# Patient Record
Sex: MALE | Race: WHITE | NOT HISPANIC OR LATINO | Employment: OTHER | URBAN - METROPOLITAN AREA
[De-identification: names, ages, dates, MRNs, and addresses within clinical notes are randomized per-mention and may not be internally consistent; named-entity substitution may affect disease eponyms.]

---

## 2017-03-14 ENCOUNTER — TELEPHONE (OUTPATIENT)
Dept: ORTHOPEDIC SURGERY | Facility: CLINIC | Age: 77
End: 2017-03-14

## 2017-04-03 ENCOUNTER — OFFICE VISIT (OUTPATIENT)
Dept: ORTHOPEDIC SURGERY | Facility: CLINIC | Age: 77
End: 2017-04-03

## 2017-04-03 VITALS — BODY MASS INDEX: 30.13 KG/M2 | WEIGHT: 192 LBS | HEIGHT: 67 IN | TEMPERATURE: 97.5 F

## 2017-04-03 DIAGNOSIS — M25.511 BILATERAL SHOULDER PAIN, UNSPECIFIED CHRONICITY: Primary | ICD-10-CM

## 2017-04-03 DIAGNOSIS — M54.12 CERVICAL RADICULOPATHY: ICD-10-CM

## 2017-04-03 DIAGNOSIS — M25.512 BILATERAL SHOULDER PAIN, UNSPECIFIED CHRONICITY: Primary | ICD-10-CM

## 2017-04-03 PROCEDURE — 99204 OFFICE O/P NEW MOD 45 MIN: CPT | Performed by: ORTHOPAEDIC SURGERY

## 2017-04-03 PROCEDURE — 20610 DRAIN/INJ JOINT/BURSA W/O US: CPT | Performed by: ORTHOPAEDIC SURGERY

## 2017-04-03 RX ORDER — METHYLPREDNISOLONE ACETATE 80 MG/ML
160 INJECTION, SUSPENSION INTRA-ARTICULAR; INTRALESIONAL; INTRAMUSCULAR; SOFT TISSUE
Status: COMPLETED | OUTPATIENT
Start: 2017-04-03 | End: 2017-04-03

## 2017-04-03 RX ORDER — LIDOCAINE HYDROCHLORIDE 10 MG/ML
2 INJECTION, SOLUTION INFILTRATION; PERINEURAL
Status: COMPLETED | OUTPATIENT
Start: 2017-04-03 | End: 2017-04-03

## 2017-04-03 RX ORDER — BUPIVACAINE HYDROCHLORIDE 5 MG/ML
2 INJECTION, SOLUTION PERINEURAL
Status: COMPLETED | OUTPATIENT
Start: 2017-04-03 | End: 2017-04-03

## 2017-04-03 RX ADMIN — BUPIVACAINE HYDROCHLORIDE 2 ML: 5 INJECTION, SOLUTION PERINEURAL at 13:59

## 2017-04-03 RX ADMIN — METHYLPREDNISOLONE ACETATE 160 MG: 80 INJECTION, SUSPENSION INTRA-ARTICULAR; INTRALESIONAL; INTRAMUSCULAR; SOFT TISSUE at 13:58

## 2017-04-03 RX ADMIN — LIDOCAINE HYDROCHLORIDE 2 ML: 10 INJECTION, SOLUTION INFILTRATION; PERINEURAL at 13:58

## 2017-04-03 RX ADMIN — METHYLPREDNISOLONE ACETATE 160 MG: 80 INJECTION, SUSPENSION INTRA-ARTICULAR; INTRALESIONAL; INTRAMUSCULAR; SOFT TISSUE at 13:59

## 2017-04-03 RX ADMIN — LIDOCAINE HYDROCHLORIDE 2 ML: 10 INJECTION, SOLUTION INFILTRATION; PERINEURAL at 13:59

## 2017-04-03 RX ADMIN — BUPIVACAINE HYDROCHLORIDE 2 ML: 5 INJECTION, SOLUTION PERINEURAL at 13:58

## 2017-04-03 NOTE — PROGRESS NOTES
Patient: Mukesh Beard Jr.    YOB: 1940    Medical Record Number: [id number]    Chief Complaints:   Bilateral shoulder pain, weakness    History of Present Illness:     76 y.o. male patient who comes in today for evaluation of both shoulders.  He reports a long history of bilateral shoulder pain.  Both shoulders bother him but the right is currently worse.  He has a history of a left rotator cuff tear for which she has previously seen Dr. Stroud.  He describes his pain on the right as moderate, intermittent, and aching/burning.  He has pain down the lateral side of the shoulder which is worse with reaching and lifting but the worst of his pain is into the back of his shoulder along the scapula.  This pain seems to come and go without any discrete precipitating event or factor.  He denies any weakness, numbness, or paresthesias on the right.    On the left, he also complains of moderate aching discomfort along the lateral side.  This pain is worse with reaching and lifting.  He has tried various anti-inflammatories and some formal physical therapy in the past for the left shoulder.  These did not seem to help very much.  Again, he denies any neurologic symptoms on the left as well.    Allergies: No Known Allergies    Home Medications:    Current Outpatient Prescriptions:   •  aspirin 81 MG tablet, Take 81 mg by mouth Daily., Disp: , Rfl:   •  atenolol (TENORMIN) 25 MG tablet, Take 12.5 mg by mouth Daily., Disp: , Rfl:   •  azelastine (ASTEPRO) 0.15 % solution nasal spray, into each nostril., Disp: , Rfl:   •  cetirizine (zyrTEC) 10 MG tablet, Take 10 mg by mouth Daily., Disp: , Rfl:   •  Cholecalciferol (VITAMIN D) 2000 UNITS capsule, Take  by mouth., Disp: , Rfl:   •  Flaxseed, Linseed, (FLAX SEED OIL) 1000 MG capsule, Take 1,200 mg by mouth Daily., Disp: , Rfl:   •  fluticasone (FLONASE) 50 MCG/ACT nasal spray, into each nostril Daily., Disp: , Rfl:   •  meloxicam (MOBIC) 15 MG tablet, Take 15 mg  by mouth Daily., Disp: , Rfl:   •  montelukast (SINGULAIR) 10 MG tablet, Take 10 mg by mouth Every Night., Disp: , Rfl:   •  niacin 500 MG tablet, Take 1,000 mg by mouth Daily With Breakfast., Disp: , Rfl:   •  Omega-3 Fatty Acids (FISH OIL) 1200 MG capsule capsule, Take  by mouth., Disp: , Rfl:   •  raNITIdine (ZANTAC) 300 MG tablet, Take 300 mg by mouth Daily., Disp: , Rfl:   •  rosuvastatin (CRESTOR) 20 MG tablet, Take 20 mg by mouth Daily., Disp: , Rfl:   •  Cinnamon 500 MG capsule, Take  by mouth., Disp: , Rfl:     Past Medical History:   Diagnosis Date   • ASCVD (arteriosclerotic cardiovascular disease)     mild lv dysfunction   • Hyperlipidemia    • Hypertension    • Vertigo 04/2011       Past Surgical History:   Procedure Laterality Date   • APPENDECTOMY     • CHOLECYSTECTOMY     • CORONARY ARTERY BYPASS GRAFT  04/2011   • HAND SURGERY         Social History     Occupational History   • Not on file.     Social History Main Topics   • Smoking status: Former Smoker   • Smokeless tobacco: Not on file   • Alcohol use No   • Drug use: Not on file   • Sexual activity: Defer      Social History     Social History Narrative       Family History   Problem Relation Age of Onset   • Heart disease Mother      valvular heart disease       Review of Systems:      Constitutional: Denies fever, shaking or chills   Eyes: Denies change in visual acuity   HEENT: Denies nasal congestion or sore throat   Respiratory: Denies cough or shortness of breath   Cardiovascular: Denies chest pain or edema  Endocrine: Denies tremors, palpitations, intolerance of heat or cold, polyuria, polydipsia.  GI: Denies abdominal pain, nausea, vomiting, bloody stools or diarrhea  : Denies frequency, urgency, incontinence, retention, or nocturia.  Musculoskeletal: Denies numbness tingling or loss of motor function except as above  Integument: Denies rash, lesion or ulceration   Neurologic: Denies headache or focal weakness, deficits  Heme: Denies  "epistaxis, spontaneous or excessive bleeding, epistaxis, hematuria, melena, fatigue, enlarged or tender lymph nodes.      All other pertinent positives and negatives as noted above in HPI.    Physical Exam: 76 y.o. male  Vitals:    04/03/17 1251   Temp: 97.5 °F (36.4 °C)   TempSrc: Temporal Artery    Weight: 192 lb (87.1 kg)   Height: 67\" (170.2 cm)       General:  Patient is awake and alert.  Appears in no acute distress or discomfort.    Psych:  Affect and demeanor are appropriate.    Eyes:  Conjunctiva and sclera appear grossly normal.  Eyes track well and EOM seem to be intact.    Ears:  No gross abnormalities.  Hearing adequate for the exam.    Cardiovascular:  Regular rate and rhythm.    Lungs:  Good chest expansion.  Breathing unlabored.    Lymph:  No palpable adenopathy about neck or axilla.    Neck:  Supple.  Normal ROM.  A Spurling's maneuver to the right does reproduce his typical posterior scapular pain.    Right upper extremity:  Skin benign and intact without evidence for swelling, masses or atrophy.  No palpable masses.  No focal areas of exquisite tenderness including the posterior scapula.  Full active ROM.  No evident instability or apprehension.  Negative Neer and Smith impingement maneuvers.  Negative Speeds, Yergason's and active compression maneuvers.  Discomfort but good strength with resistive testing of elevation in scapular plane.  Negative Hornblower's and ER lag sign.  Good strength in wrist and hand.  Intact sensation in arm, hand.  Palpable radial pulse with brisk cap refill.    Left upper extremity:  His exam on the left is similar to that on the right.  Motion is full.  No instability.  Pain and weakness with resisted elevation in the scapular plane and external rotation.  Otherwise, his exam is benign on this side.         Radiology:   Outside x-rays of both shoulders are brought in by the patient and reviewed.  These films are from December 2016 and include AP, rotated AP, and " axillary views of the shoulders.  The x-rays show mild bilateral degenerative changes.  There is some chronic calcification adjacent to the rotator cuff insertion on the left.  Acromiohumeral interval is normal bilaterally.  No significant glenoid wear or retroversion.  His previous MRI of the left shoulder from 2015 is also reviewed.  He has what appears to be a small tear of the supraspinatus along with mild degenerative changes.    Assessment/Plan:   1.  Right shoulder rotator cuff tendinopathy versus tear  2.  Left shoulder rotator cuff tear  3.  Cervical radiculopathy    I think he has a couple of separate issues going on here.  He's got fairly good strength in the right shoulder but he seems to be having some cuff mediated symptoms.  I think that he either has some irritation of his rotator cuff or possibly a small tear.  I suggested that we try an injection for that.  The risks, benefits, and alternatives were discussed and he consented to proceed as described below.    He is also having pain into the back of that shoulder and his Spurling's maneuver suggest that this may be coming from his neck.  He is interested in pursuing epidurals.  I suggested that we get an MRI to better evaluate for possible radiculopathy.    He has a history of a left rotator cuff tear as well.  He is weak on that shoulder and clearly has symptomatology and exam suggested that his tear is probably gotten a little worse.  We talked about options for this as well.  Again, he wants to proceed with an injection.    Going forward, he will follow-up with me as needed.  I instructed him to call for the results of the MRI and then we will refer him for epidurals, if indicated.    Large Joint Arthrocentesis  Date/Time: 4/3/2017 1:58 PM  Consent given by: patient  Site marked: site marked  Timeout: Immediately prior to procedure a time out was called to verify the correct patient, procedure, equipment, support staff and site/side marked as  required   Supporting Documentation  Indications: pain and joint swelling   Procedure Details  Location: shoulder - R subacromial bursa  Preparation: Patient was prepped and draped in the usual sterile fashion  Needle size: 25 G (21 G)  Approach: anterolateral  Medications administered: 2 mL bupivacaine 0.5 %; 2 mL lidocaine 1 %; 160 mg methylPREDNISolone acetate 80 MG/ML  Patient tolerance: patient tolerated the procedure well with no immediate complications    Large Joint Arthrocentesis  Date/Time: 4/3/2017 1:59 PM  Consent given by: patient  Site marked: site marked  Timeout: Immediately prior to procedure a time out was called to verify the correct patient, procedure, equipment, support staff and site/side marked as required   Supporting Documentation  Indications: pain and joint swelling   Procedure Details  Location: shoulder - L subacromial bursa  Preparation: Patient was prepped and draped in the usual sterile fashion  Needle size: 25 G  Approach: anterolateral  Medications administered: 2 mL bupivacaine 0.5 %; 2 mL lidocaine 1 %; 160 mg methylPREDNISolone acetate 80 MG/ML  Patient tolerance: patient tolerated the procedure well with no immediate complications            Ja Archer MD    04/03/2017    CC to Alexandra Barnes MD

## 2017-04-11 ENCOUNTER — TELEPHONE (OUTPATIENT)
Dept: ORTHOPEDIC SURGERY | Facility: CLINIC | Age: 77
End: 2017-04-11

## 2017-04-11 DIAGNOSIS — M54.12 CERVICAL RADICULOPATHY: Primary | ICD-10-CM

## 2017-04-27 ENCOUNTER — ANESTHESIA EVENT (OUTPATIENT)
Dept: PAIN MEDICINE | Facility: HOSPITAL | Age: 77
End: 2017-04-27

## 2017-04-27 RX ORDER — METHYLPREDNISOLONE ACETATE 80 MG/ML
80 INJECTION, SUSPENSION INTRA-ARTICULAR; INTRALESIONAL; INTRAMUSCULAR; SOFT TISSUE ONCE
Status: COMPLETED | OUTPATIENT
Start: 2017-04-27 | End: 2017-04-28

## 2017-04-27 RX ORDER — LIDOCAINE HYDROCHLORIDE 10 MG/ML
1 INJECTION, SOLUTION INFILTRATION; PERINEURAL ONCE
Status: DISCONTINUED | OUTPATIENT
Start: 2017-04-27 | End: 2017-04-29 | Stop reason: HOSPADM

## 2017-04-27 RX ORDER — MIDAZOLAM HYDROCHLORIDE 1 MG/ML
1 INJECTION INTRAMUSCULAR; INTRAVENOUS
Status: DISCONTINUED | OUTPATIENT
Start: 2017-04-27 | End: 2017-04-29 | Stop reason: HOSPADM

## 2017-04-27 RX ORDER — FENTANYL CITRATE 50 UG/ML
50 INJECTION, SOLUTION INTRAMUSCULAR; INTRAVENOUS
Status: DISCONTINUED | OUTPATIENT
Start: 2017-04-27 | End: 2017-04-29 | Stop reason: HOSPADM

## 2017-04-27 RX ORDER — BUPIVACAINE HYDROCHLORIDE 2.5 MG/ML
30 INJECTION, SOLUTION EPIDURAL; INFILTRATION; INTRACAUDAL ONCE
Status: DISCONTINUED | OUTPATIENT
Start: 2017-04-27 | End: 2017-04-29 | Stop reason: HOSPADM

## 2017-04-27 RX ORDER — SODIUM CHLORIDE 0.9 % (FLUSH) 0.9 %
1-10 SYRINGE (ML) INJECTION AS NEEDED
Status: DISCONTINUED | OUTPATIENT
Start: 2017-04-27 | End: 2017-04-29 | Stop reason: HOSPADM

## 2017-04-27 NOTE — ANESTHESIA PROCEDURE NOTES
PAIN Epidural block    Patient location during procedure: pain clinic  Start Time: 4/28/2017 12:13 PM  Stop Time: 4/28/2017 12:21 PM  Indication:at surgeon's request and procedure for pain  Performed By  Anesthesiologist: BLNACA CHUNG  Preanesthetic Checklist  Completed: patient identified, site marked, surgical consent, pre-op evaluation, timeout performed, IV checked, risks and benefits discussed and monitors and equipment checked  Additional Notes  Dx :  Cervical Radiculopathy  80 mg depomedrol in epid  Epidural Block Prep:  Pt Position:prone (prone)  Sterile Tech:cap, gloves, mask and sterile barrier  Prep:chlorhexidine gluconate and isopropyl alcohol  Monitoring:blood pressure monitoring, continuous pulse oximetry and EKG  Epidural Block Procedure:  Approach:midline  Guidance: fluoroscopy and c arm pa and lat and loss of resistance  Location:cervical  Level:6-7 (interlaminar)  Needle Type:Olga  Needle Gauge:20  Aspiration:negative  Medications:  Depomedrol:80 mg  Preservative Free Saline:3mL  Isovue:2mL    Post Assessment:  Post-procedure: bandaid.  Pt Tolerance:patient tolerated the procedure well with no apparent complications  Complications:no

## 2017-04-28 ENCOUNTER — HOSPITAL ENCOUNTER (OUTPATIENT)
Dept: PAIN MEDICINE | Facility: HOSPITAL | Age: 77
Discharge: HOME | End: 2017-04-28
Attending: ORTHOPAEDIC SURGERY | Admitting: ORTHOPAEDIC SURGERY

## 2017-04-28 ENCOUNTER — HOSPITAL ENCOUNTER (OUTPATIENT)
Dept: GENERAL RADIOLOGY | Facility: HOSPITAL | Age: 77
Discharge: HOME | End: 2017-04-28

## 2017-04-28 ENCOUNTER — ANESTHESIA (OUTPATIENT)
Dept: PAIN MEDICINE | Facility: HOSPITAL | Age: 77
End: 2017-04-28

## 2017-04-28 VITALS
TEMPERATURE: 97.5 F | SYSTOLIC BLOOD PRESSURE: 153 MMHG | BODY MASS INDEX: 29.82 KG/M2 | OXYGEN SATURATION: 99 % | HEART RATE: 52 BPM | WEIGHT: 190 LBS | DIASTOLIC BLOOD PRESSURE: 73 MMHG | RESPIRATION RATE: 16 BRPM | HEIGHT: 67 IN

## 2017-04-28 DIAGNOSIS — M54.12 CERVICAL RADICULOPATHY: ICD-10-CM

## 2017-04-28 DIAGNOSIS — R52 PAIN: ICD-10-CM

## 2017-04-28 PROCEDURE — 25010000002 METHYLPREDNISOLONE PER 80 MG: Performed by: ANESTHESIOLOGY

## 2017-04-28 PROCEDURE — C1755 CATHETER, INTRASPINAL: HCPCS

## 2017-04-28 PROCEDURE — 0 IOPAMIDOL 41 % SOLUTION: Performed by: ANESTHESIOLOGY

## 2017-04-28 PROCEDURE — 77003 FLUOROGUIDE FOR SPINE INJECT: CPT

## 2017-04-28 RX ADMIN — IOPAMIDOL 10 ML: 408 INJECTION, SOLUTION INTRATHECAL at 12:20

## 2017-04-28 RX ADMIN — METHYLPREDNISOLONE ACETATE 80 MG: 80 INJECTION, SUSPENSION INTRA-ARTICULAR; INTRALESIONAL; INTRAMUSCULAR; SOFT TISSUE at 12:20

## 2017-04-28 NOTE — H&P
"[facility]    History and Physical    Patient Name: Mukesh Beard Jr.  :  1940  MRN:  [id number]  Date of Admission: 2017    Subjective     Patient is a 76 y.o. male presents with chief complaint of chronic, constant, severe, aching, sharp neck and upper back pain.  Onset of symptoms was gradual starting 30 years ago.  Symptoms are associated/aggravated by activity. Symptoms improve with relaxation    The following portions of the patients history were reviewed and updated as appropriate: current medications, allergies, past medical history, past surgical history, past family history, past social history and problem list                Objective     Past Medical History:   Past Medical History:   Diagnosis Date   • Arthritis    • ASCVD (arteriosclerotic cardiovascular disease)     mild lv dysfunction   • Hyperlipidemia    • Hypertension    • Vertigo 2011     Past Surgical History:   Past Surgical History:   Procedure Laterality Date   • APPENDECTOMY     • CATARACT EXTRACTION     • CHOLECYSTECTOMY     • CORONARY ARTERY BYPASS GRAFT  2011   • HAND SURGERY       Family History:   Family History   Problem Relation Age of Onset   • Heart disease Mother      valvular heart disease     Social History:   Social History   Substance Use Topics   • Smoking status: Former Smoker     Quit date:    • Smokeless tobacco: Never Used   • Alcohol use No       Vital Signs Range for the last 24 hours  Temperature: Temp:  [36.4 °C (97.5 °F)] 36.4 °C (97.5 °F)   Temp Source: Temp src: Oral   BP: BP: (153)/(97) 153/97   Pulse: Heart Rate:  [46] 46   Respirations: Resp:  [16] 16   SPO2: SpO2:  [98 %] 98 %   O2 Amount (l/min):     O2 Devices O2 Device: room air   Weight: Weight:  [190 lb (86.2 kg)] 190 lb (86.2 kg)     Flowsheet Rows         First Filed Value    Admission Height  67\" (170.2 cm) Documented at 2017 1155    Admission Weight  190 lb (86.2 kg) Documented at 2017 1155    "       --------------------------------------------------------------------------------    Current Outpatient Prescriptions   Medication Sig Dispense Refill   • atenolol (TENORMIN) 25 MG tablet Take 12.5 mg by mouth Daily.     • azelastine (ASTEPRO) 0.15 % solution nasal spray into each nostril.     • cetirizine (zyrTEC) 10 MG tablet Take 10 mg by mouth Daily.     • Cholecalciferol (VITAMIN D) 2000 UNITS capsule Take  by mouth.     • Cinnamon 500 MG capsule Take  by mouth.     • meloxicam (MOBIC) 15 MG tablet Take 15 mg by mouth Daily.     • montelukast (SINGULAIR) 10 MG tablet Take 10 mg by mouth Every Night.     • niacin 500 MG tablet Take 1,000 mg by mouth Daily With Breakfast.     • raNITIdine (ZANTAC) 300 MG tablet Take 300 mg by mouth Daily.     • rosuvastatin (CRESTOR) 20 MG tablet Take 20 mg by mouth Daily.     • aspirin 81 MG tablet Take 81 mg by mouth Daily.     • Flaxseed, Linseed, (FLAX SEED OIL) 1000 MG capsule Take 1,200 mg by mouth Daily.     • fluticasone (FLONASE) 50 MCG/ACT nasal spray into each nostril Daily.     • Omega-3 Fatty Acids (FISH OIL) 1200 MG capsule capsule Take  by mouth.       Current Facility-Administered Medications   Medication Dose Route Frequency Provider Last Rate Last Dose   • bupivacaine PF (MARCAINE) 0.25 % injection 30 mL  30 mL Infiltration Once Emir Gurrola MD       • fentaNYL citrate (PF) (SUBLIMAZE) injection 50 mcg  50 mcg Intravenous Q5 Min PRN Emir Gurrola MD       • iopamidol (ISOVUE-M 200) injection 41%  2 mL Injection Once in imaging Emir Gurrola MD       • lidocaine (XYLOCAINE) 1 % injection 1 mL  1 mL Intradermal Once Emir Gurrola MD       • methylPREDNISolone acetate (DEPO-medrol) injection 80 mg  80 mg Intramuscular Once Emir Gurrola MD       • midazolam (VERSED) injection 1 mg  1 mg Intravenous Q5 Min PRN Emir Gurrola MD       • sodium chloride 0.9 % flush 1-10 mL  1-10 mL Intravenous PRN Emir Gurrola MD            --------------------------------------------------------------------------------  Assessment/Plan      Anesthesia Evaluation     Patient summary reviewed and Nursing notes reviewed      Airway   Dental - normal exam     Pulmonary - negative pulmonary ROS and normal exam   Cardiovascular - normal exam    (+) hypertension, CABG,       Neuro/Psych- negative ROS and neuro exam normal  GI/Hepatic/Renal/Endo - negative ROS     Musculoskeletal (-) negative ROS and normal exam    Abdominal  - normal exam   Substance History - negative use     OB/GYN negative ob/gyn ROS         Other                               Diagnosis and Plan    Treatment Plan  ASA 2      Procedures: Cervical Epidural Steroid Injection(SABA), With fluoroscopy,       Anesthetic plan and risks discussed with patient.          * No Diagnosis Codes entered *                    PHYSICAL EXAM:    Well-developed well-nourished no acute distress  Extra ocular movements intact  Mallampati class II airway  Cardiac:  Regular rate and rhythm  Lungs:  Clear to auscultation bilaterally with good effort  Alert and oriented ×3  Deep tendon reflexes normal in the bilateral bicep and tricep   5 out of 5 strength bilateral upper and lower extremities  Cervical spine without obvious deformities ecchymoses  Cervical spine nontender to palpation      DIAGNOSIS:Cervical Radiculopathy      PLAN:  1.  Cervical epidural steroid injections, up to 3, spaced 1-2 weeks apart.  If pain control is acceptable after 1 or 2 injections, it was discussed with the patient that they may return for the subsequent injections if and when their pain returns.  The risks were discussed with the patient including failure of relief, worsening pain, Headache (post dural puncture headache), bleeding (epidural hematoma) and infection (epidural abscess or skin infection).  2.  Physical therapy exercises at home as prescribed by physical therapy or from the pain clinic handout (given to the  patient).  Continuation of these exercises every day, or multiple times per week, even when the patient has good pain relief, was stressed to the patient as a preventative measure to decrease the frequency and severity of future pain episodes.  3.  Continue pain medicines as already prescribed.  If patient not currently taking any, it is recommended to begin Acetaminophen 1000 mg po q 8 hours.  If other medicines containing Acetaminophen are currently prescribed, maintain daily dose at 3000 mg.    4.  If they can tolerate NSAIDS, it is recommended to take Ibuprofen 600 mg po q 6 hours for 7 days during pain exacerbations.  Alternatively, they may substitute an NSAID of their choice (e.g. Aleve).  This may be taken at the same time as Acetaminophen.  5.  Heat and ice to the affected area as tolerated for pain control.  It was discussed that heating pads can cause burns.  6.  Low impact exercise such as walking or water exercise was recommended to maintain overall health and aid in weight control.   7.  Follow up as needed for subsequent injections.  8.  Patient was counseled to abstain from tobacco products.

## 2017-05-08 ENCOUNTER — TELEPHONE (OUTPATIENT)
Dept: ORTHOPEDIC SURGERY | Facility: CLINIC | Age: 77
End: 2017-05-08

## 2017-05-08 ENCOUNTER — TRANSCRIBE ORDERS (OUTPATIENT)
Dept: PAIN MEDICINE | Facility: HOSPITAL | Age: 77
End: 2017-05-08

## 2017-05-08 DIAGNOSIS — M54.12 CERVICAL RADICULOPATHY: Primary | ICD-10-CM

## 2017-05-08 DIAGNOSIS — R52 PAIN: Primary | ICD-10-CM

## 2017-05-30 ENCOUNTER — ANESTHESIA (OUTPATIENT)
Dept: PAIN MEDICINE | Facility: HOSPITAL | Age: 77
End: 2017-05-30

## 2017-05-30 ENCOUNTER — HOSPITAL ENCOUNTER (OUTPATIENT)
Dept: GENERAL RADIOLOGY | Facility: HOSPITAL | Age: 77
Discharge: HOME | End: 2017-05-30

## 2017-05-30 ENCOUNTER — HOSPITAL ENCOUNTER (OUTPATIENT)
Dept: PAIN MEDICINE | Facility: HOSPITAL | Age: 77
Discharge: HOME | End: 2017-05-30
Admitting: ORTHOPAEDIC SURGERY

## 2017-05-30 ENCOUNTER — ANESTHESIA EVENT (OUTPATIENT)
Dept: PAIN MEDICINE | Facility: HOSPITAL | Age: 77
End: 2017-05-30

## 2017-05-30 VITALS
BODY MASS INDEX: 29.51 KG/M2 | TEMPERATURE: 97.9 F | HEART RATE: 55 BPM | RESPIRATION RATE: 16 BRPM | SYSTOLIC BLOOD PRESSURE: 160 MMHG | DIASTOLIC BLOOD PRESSURE: 86 MMHG | WEIGHT: 188 LBS | OXYGEN SATURATION: 99 % | HEIGHT: 67 IN

## 2017-05-30 DIAGNOSIS — M54.12 CERVICAL RADICULOPATHY: ICD-10-CM

## 2017-05-30 DIAGNOSIS — R52 PAIN: ICD-10-CM

## 2017-05-30 PROCEDURE — 77003 FLUOROGUIDE FOR SPINE INJECT: CPT

## 2017-05-30 PROCEDURE — C1755 CATHETER, INTRASPINAL: HCPCS

## 2017-05-30 PROCEDURE — 0 IOPAMIDOL 41 % SOLUTION: Performed by: ANESTHESIOLOGY

## 2017-05-30 PROCEDURE — 25010000002 METHYLPREDNISOLONE PER 80 MG: Performed by: ANESTHESIOLOGY

## 2017-05-30 RX ORDER — SODIUM CHLORIDE 0.9 % (FLUSH) 0.9 %
1-10 SYRINGE (ML) INJECTION AS NEEDED
Status: DISCONTINUED | OUTPATIENT
Start: 2017-05-30 | End: 2017-05-31 | Stop reason: HOSPADM

## 2017-05-30 RX ORDER — FENTANYL CITRATE 50 UG/ML
50 INJECTION, SOLUTION INTRAMUSCULAR; INTRAVENOUS AS NEEDED
Status: DISCONTINUED | OUTPATIENT
Start: 2017-05-30 | End: 2017-05-31 | Stop reason: HOSPADM

## 2017-05-30 RX ORDER — METHYLPREDNISOLONE ACETATE 80 MG/ML
80 INJECTION, SUSPENSION INTRA-ARTICULAR; INTRALESIONAL; INTRAMUSCULAR; SOFT TISSUE ONCE
Status: COMPLETED | OUTPATIENT
Start: 2017-05-30 | End: 2017-05-30

## 2017-05-30 RX ORDER — MIDAZOLAM HYDROCHLORIDE 1 MG/ML
1 INJECTION INTRAMUSCULAR; INTRAVENOUS AS NEEDED
Status: DISCONTINUED | OUTPATIENT
Start: 2017-05-30 | End: 2017-05-31 | Stop reason: HOSPADM

## 2017-05-30 RX ORDER — LIDOCAINE HYDROCHLORIDE 10 MG/ML
1 INJECTION, SOLUTION INFILTRATION; PERINEURAL ONCE
Status: DISCONTINUED | OUTPATIENT
Start: 2017-05-30 | End: 2017-05-31 | Stop reason: HOSPADM

## 2017-05-30 RX ADMIN — METHYLPREDNISOLONE ACETATE 80 MG: 80 INJECTION, SUSPENSION INTRA-ARTICULAR; INTRALESIONAL; INTRAMUSCULAR; SOFT TISSUE at 14:26

## 2017-05-30 RX ADMIN — IOPAMIDOL 10 ML: 408 INJECTION, SOLUTION INTRATHECAL at 14:26

## 2017-11-10 ENCOUNTER — OFFICE VISIT (OUTPATIENT)
Dept: CARDIOLOGY | Facility: CLINIC | Age: 77
End: 2017-11-10

## 2017-11-10 VITALS
BODY MASS INDEX: 29.51 KG/M2 | WEIGHT: 188 LBS | HEART RATE: 53 BPM | HEIGHT: 67 IN | DIASTOLIC BLOOD PRESSURE: 98 MMHG | SYSTOLIC BLOOD PRESSURE: 168 MMHG

## 2017-11-10 DIAGNOSIS — I10 ESSENTIAL HYPERTENSION: ICD-10-CM

## 2017-11-10 DIAGNOSIS — I25.810 CORONARY ARTERY DISEASE INVOLVING CORONARY BYPASS GRAFT OF NATIVE HEART WITHOUT ANGINA PECTORIS: Primary | ICD-10-CM

## 2017-11-10 DIAGNOSIS — E78.2 MIXED HYPERLIPIDEMIA: ICD-10-CM

## 2017-11-10 PROCEDURE — 93000 ELECTROCARDIOGRAM COMPLETE: CPT | Performed by: INTERNAL MEDICINE

## 2017-11-10 PROCEDURE — 99214 OFFICE O/P EST MOD 30 MIN: CPT | Performed by: INTERNAL MEDICINE

## 2017-11-10 RX ORDER — BIMATOPROST 0.01 %
1 DROPS OPHTHALMIC (EYE) NIGHTLY
COMMUNITY
Start: 2017-10-03 | End: 2019-11-18

## 2017-11-10 NOTE — PROGRESS NOTES
Date of Office Visit: 11/10/17  Encounter Provider: Elian Rizo MD  Place of Service: [facility] CARDIOLOGY  Patient Name: Mukesh Beard Jr.  :1940      Chief Complaint   Patient presents with   • Coronary Artery Disease     History of Present Illness  HPI Comments: Mr. Beard is a pleasant 77-year-old gentleman with history of ischemic heart disease,  mild left ventricular systolic dysfunction, severe three-vessel disease, and left main  disease. He had coronary artery bypass grafting in May 2000 where he had left internal  mammary graft to left anterior descending, vein graft to the diagonal, vein graft to the  obtuse marginal, vein graft to the second obtuse marginal, and vein graft to the posterior  descending artery. Then, in , he had some vertigo with a negative carotid study and  unremarkable echo. He comes in for followup now.  The patient denies chest pain, pressure and heaviness. No shortness of breath, othopnea or PND. No palpitations, near syncope or syncope. No stroke type symptoms like paralysis, paresthesia, abrupt vision loss and dysarthria. No bleeding like blood in the stool or dark stools.   He's walking on a treadmill and mild half and 26 minutes and then does Cardioglide insensitive she doesn't every day without problems.  She's had with his shoulders and allergies.    Coronary Artery Disease   Pertinent negatives include no dizziness, muscle weakness or weight gain. There is no history of past myocardial infarction.         Past Medical History:   Diagnosis Date   • Arthritis    • ASCVD (arteriosclerotic cardiovascular disease)     mild lv dysfunction   • Hyperlipidemia    • Hypertension    • Vertigo 2011         Past Surgical History:   Procedure Laterality Date   • APPENDECTOMY     • CATARACT EXTRACTION     • CHOLECYSTECTOMY     • CORONARY ARTERY BYPASS GRAFT  2011   • EPIDURAL BLOCK     • HAND SURGERY           Current Outpatient  Prescriptions on File Prior to Visit   Medication Sig Dispense Refill   • aspirin 81 MG tablet Take 81 mg by mouth Daily.     • atenolol (TENORMIN) 25 MG tablet Take 12.5 mg by mouth Daily.     • azelastine (ASTEPRO) 0.15 % solution nasal spray into each nostril.     • cetirizine (zyrTEC) 10 MG tablet Take 10 mg by mouth Daily.     • Cholecalciferol (VITAMIN D) 2000 UNITS capsule Take  by mouth.     • Cinnamon 500 MG capsule Take  by mouth.     • Flaxseed, Linseed, (FLAX SEED OIL) 1000 MG capsule Take 1,200 mg by mouth Daily.     • meloxicam (MOBIC) 15 MG tablet Take 15 mg by mouth Daily.     • montelukast (SINGULAIR) 10 MG tablet Take 10 mg by mouth Every Night.     • niacin 500 MG tablet Take 1,000 mg by mouth Daily With Breakfast.     • Omega-3 Fatty Acids (FISH OIL) 1200 MG capsule capsule Take  by mouth.     • raNITIdine (ZANTAC) 300 MG tablet Take 300 mg by mouth Daily.     • rosuvastatin (CRESTOR) 20 MG tablet Take 20 mg by mouth Daily.     • [DISCONTINUED] fluticasone (FLONASE) 50 MCG/ACT nasal spray into each nostril Daily.       No current facility-administered medications on file prior to visit.          Social History     Social History   • Marital status:      Spouse name: N/A   • Number of children: N/A   • Years of education: N/A     Occupational History   • Not on file.     Social History Main Topics   • Smoking status: Former Smoker     Quit date: 1972   • Smokeless tobacco: Never Used   • Alcohol use No   • Drug use: No   • Sexual activity: Defer     Other Topics Concern   • Not on file     Social History Narrative       Family History   Problem Relation Age of Onset   • Heart disease Mother      valvular heart disease         Review of Systems   Constitution: Negative for decreased appetite, diaphoresis, fever, weakness, malaise/fatigue, weight gain and weight loss.   HENT: Negative for congestion, hearing loss, nosebleeds and tinnitus.    Eyes: Negative for blurred vision, double vision,  "vision loss in left eye, vision loss in right eye and visual disturbance.   Cardiovascular:        As noted in HPI   Respiratory:        As noted HPI   Endocrine: Negative for cold intolerance and heat intolerance.   Hematologic/Lymphatic: Negative for bleeding problem. Does not bruise/bleed easily.   Skin: Negative for color change, flushing, itching and rash.   Musculoskeletal: Positive for joint pain. Negative for arthritis, back pain, joint swelling, muscle weakness and myalgias.   Gastrointestinal: Negative for bloating, abdominal pain, constipation, diarrhea, dysphagia, heartburn, hematemesis, hematochezia, melena, nausea and vomiting.   Genitourinary: Negative for bladder incontinence, dysuria, frequency, nocturia and urgency.   Neurological: Negative for dizziness, focal weakness, headaches, light-headedness, loss of balance, numbness, paresthesias and vertigo.   Psychiatric/Behavioral: Negative for depression, memory loss and substance abuse.       Procedures      ECG 12 Lead  Date/Time: 11/10/2017 11:55 AM  Performed by: PARMINDER COOL  Authorized by: PARMINDER COOL   Comparison: compared with previous ECG   Similar to previous ECG  Rhythm: sinus rhythm  Rate: normal  Conduction: 1st degree  QRS axis: normal                 Objective:    /98  Pulse 53  Ht 67\" (170.2 cm)  Wt 188 lb (85.3 kg)  BMI 29.44 kg/m2       Physical Exam  Physical Exam   Constitutional: He is oriented to person, place, and time. He appears well-developed and well-nourished. No distress.   HENT:   Head: Normocephalic.   Eyes: Conjunctivae are normal. Pupils are equal, round, and reactive to light. No scleral icterus.   Neck: Normal carotid pulses, no hepatojugular reflux and no JVD present. Carotid bruit is not present. No tracheal deviation, no edema and no erythema present. No thyromegaly present.   Cardiovascular: Normal rate, regular rhythm, S1 normal, S2 normal and intact distal pulses.   No extrasystoles are " present. PMI is not displaced.  Exam reveals no gallop, no distant heart sounds and no friction rub.    Murmur heard.   Systolic murmur is present with a grade of 2/6  at the upper right sternal border  Pulses:       Carotid pulses are 2+ on the right side with bruit, and 2+ on the left side with bruit.       Radial pulses are 2+ on the right side, and 2+ on the left side.        Femoral pulses are 2+ on the right side, and 2+ on the left side.       Dorsalis pedis pulses are 2+ on the right side, and 2+ on the left side.        Posterior tibial pulses are 2+ on the right side, and 2+ on the left side.   Pulmonary/Chest: Effort normal and breath sounds normal. No respiratory distress. He has no decreased breath sounds. He has no wheezes. He has no rhonchi. He has no rales. He exhibits no tenderness.   Abdominal: Soft. Bowel sounds are normal. He exhibits no distension and no mass. There is no hepatosplenomegaly. There is no tenderness. There is no rebound and no guarding.   Musculoskeletal: He exhibits no edema, tenderness or deformity.   Neurological: He is alert and oriented to person, place, and time.   Skin: Skin is warm and dry. No rash noted. He is not diaphoretic. No cyanosis or erythema. No pallor. Nails show no clubbing.   Psychiatric: He has a normal mood and affect. His speech is normal and behavior is normal. Judgment and thought content normal.           Assessment:   1. This is a 77-year-old gentleman with history of severe ischemic heart disease and mild left ventricular systolic dysfunction status post coronary artery bypass grafting in May  2000. Echocardiogram done August 2011 showed an ejection fraction of 54% with mild mitral insufficiency and normal RV systolic pressure. Perfusion stress test 11/13 was unremarkable.   Coronary Artery Disease  Assessment  • The patient has no angina    Plan  • Lifestyle modifications discussed include adhering to a heart healthy diet, avoidance of tobacco  products, maintenance of a healthy weight, medication compliance, regular exercise and regular monitoring of cholesterol and blood pressure    Subjective - Objective  • There is a history of previous coronary artery bypass graft  • Current antiplatelet therapy includes aspirin 81 mg    No complaints now. He is to continue the same and see us in follow up in one year.     2. History of hyperlipidemia, on rosuvastatin. Last LDL was 38 with HDL of 42.  3. Bilateral carotid bruits.  Carotid ultrasound 2009, 2011 and then again in 2015 showed no stenosis.   4.  Hypertension blood pressure elevated today but is been otherwise unremarkable control.       Plan:

## 2018-03-02 ENCOUNTER — OFFICE VISIT (OUTPATIENT)
Dept: ORTHOPEDIC SURGERY | Facility: CLINIC | Age: 78
End: 2018-03-02

## 2018-03-02 DIAGNOSIS — M25.512 LEFT SHOULDER PAIN, UNSPECIFIED CHRONICITY: Primary | ICD-10-CM

## 2018-03-02 PROCEDURE — 99213 OFFICE O/P EST LOW 20 MIN: CPT | Performed by: ORTHOPAEDIC SURGERY

## 2018-03-02 PROCEDURE — 73030 X-RAY EXAM OF SHOULDER: CPT | Performed by: ORTHOPAEDIC SURGERY

## 2018-03-02 RX ORDER — CHOLECALCIFEROL (VITAMIN D3) 25 MCG
CAPSULE ORAL
COMMUNITY
End: 2018-04-03

## 2018-03-02 RX ORDER — DOXYCYCLINE HYCLATE 100 MG/1
100 CAPSULE ORAL EVERY MORNING
COMMUNITY
End: 2022-03-30 | Stop reason: ALTCHOICE

## 2018-03-04 NOTE — PROGRESS NOTES
Patient:Mukesh Beard Jr.    YOB: 1940    Medical Record Number:[id number]    Chief Complaints:  Left shoulder pain    History of Present Illness:     77 y.o. male patient who presents for follow-up of his left shoulder.  He feels like his pain is getting worse.  Pain is moderate, constant, and aching.  Pain is worse with reaching and lifting as well as at night.  He tells me he is struggling with routine daily activities including getting dressed.  This has been particularly difficult for him because he is left-hand dominant.  He has a history of a left rotator cuff tear which was diagnosed in 2015.    Allergies:No Known Allergies    Home Medications:    Current Outpatient Prescriptions:   •  aspirin 81 MG tablet, Take 81 mg by mouth Daily., Disp: , Rfl:   •  atenolol (TENORMIN) 25 MG tablet, Take 12.5 mg by mouth Daily., Disp: , Rfl:   •  Cholecalciferol (VITAMIN D-3) 1000 units capsule, Take  by mouth., Disp: , Rfl:   •  Cinnamon 500 MG capsule, Take  by mouth. 2000mg qd, Disp: , Rfl:   •  doxycycline (VIBRAMYCIN) 100 MG capsule, Take 100 mg by mouth Daily., Disp: , Rfl:   •  Flaxseed, Linseed, (FLAX SEED OIL) 1000 MG capsule, Take 1,200 mg by mouth Daily., Disp: , Rfl:   •  loteprednol (LOTEMAX) 0.2 % suspension, As Needed., Disp: , Rfl:   •  LUMIGAN 0.01 % ophthalmic drops, , Disp: , Rfl:   •  meloxicam (MOBIC) 15 MG tablet, Take 15 mg by mouth Daily., Disp: , Rfl:   •  metoprolol tartrate (LOPRESSOR) 25 MG tablet, Take 25 mg by mouth Daily. 1/2 tablet qd, Disp: , Rfl:   •  niacin 500 MG tablet, Take 1,000 mg by mouth Daily With Breakfast., Disp: , Rfl:   •  Omega-3 Fatty Acids (FISH OIL) 1200 MG capsule capsule, Take  by mouth., Disp: , Rfl:   •  raNITIdine (ZANTAC) 300 MG tablet, Take 300 mg by mouth Daily., Disp: , Rfl:   •  rosuvastatin (CRESTOR) 20 MG tablet, Take 20 mg by mouth Daily., Disp: , Rfl:   •  azelastine (ASTEPRO) 0.15 % solution nasal spray, into each nostril., Disp: , Rfl:   •   cetirizine (zyrTEC) 10 MG tablet, Take 10 mg by mouth Daily., Disp: , Rfl:   •  Cholecalciferol (VITAMIN D) 2000 UNITS capsule, Take  by mouth., Disp: , Rfl:   •  montelukast (SINGULAIR) 10 MG tablet, Take 10 mg by mouth Every Night., Disp: , Rfl:     Past Medical History:   Diagnosis Date   • Arthritis    • ASCVD (arteriosclerotic cardiovascular disease)     mild lv dysfunction   • Hyperlipidemia    • Hypertension    • Vertigo 04/2011       Past Surgical History:   Procedure Laterality Date   • APPENDECTOMY     • CATARACT EXTRACTION     • CHOLECYSTECTOMY     • CORONARY ARTERY BYPASS GRAFT  04/2011   • EPIDURAL BLOCK     • HAND SURGERY         Social History     Occupational History   • Not on file.     Social History Main Topics   • Smoking status: Former Smoker     Quit date: 1972   • Smokeless tobacco: Never Used   • Alcohol use No   • Drug use: No   • Sexual activity: Defer      Social History     Social History Narrative       Family History   Problem Relation Age of Onset   • Heart disease Mother      valvular heart disease       Review of Systems:      Constitutional: Denies fever, shaking or chills   Eyes: Denies change in visual acuity   HEENT: Denies nasal congestion or sore throat   Respiratory: Denies cough or shortness of breath   Cardiovascular: Denies chest pain or edema  Endocrine: Denies tremors, palpitations, intolerance of heat or cold, polyuria, polydipsia.  GI: Denies abdominal pain, nausea, vomiting, bloody stools or diarrhea  : Denies frequency, urgency, incontinence, retention, or nocturia.  Musculoskeletal: Denies numbness tingling or loss of motor function except as above  Integument: Denies rash, lesion or ulceration   Neurologic: Denies headache or focal weakness, deficits  Heme: Denies epistaxis, spontaneous or excessive bleeding, epistaxis, hematuria, melena, fatigue, enlarged or tender lymph nodes.      All other pertinent positives and negatives as noted above in HPI.    Physical  Exam:77 y.o. male  There were no vitals filed for this visit.    General:  Patient is awake and alert.  Appears in no acute distress or discomfort.    Psych:  Affect and demeanor are appropriate.    Extremities:  The left shoulder is examined.  Skin is benign.  No effusion.  Mild tenderness anteriorly.  Full motion.  No instability.  Pain and 4 out of 5 strength with elevation in the scapular plane.  5 minus out of 5 external rotation strength.    Imaging:  AP and scapular Y views of the left shoulder are ordered, reviewed, and compared to previous films.  No changes relative to previous films.  Moderate acromioclavicular arthritis but the glenohumeral articulation is well maintained as is the acromiohumeral interval.    Assessment/Plan:  Left rotator cuff tear    We discussed options for him.  He has expressed interest in pursuing further workup and potential surgical options.  I recommended that we refer him for an MRI of the left shoulder.  I will call him with the results and then we will come up with a plan.    Ja Archer MD    03/02/2018

## 2018-03-14 ENCOUNTER — TELEPHONE (OUTPATIENT)
Dept: ORTHOPEDIC SURGERY | Facility: CLINIC | Age: 78
End: 2018-03-14

## 2018-03-26 ENCOUNTER — OFFICE VISIT (OUTPATIENT)
Dept: ORTHOPEDIC SURGERY | Facility: CLINIC | Age: 78
End: 2018-03-26

## 2018-03-26 VITALS — WEIGHT: 188 LBS | BODY MASS INDEX: 29.51 KG/M2 | HEIGHT: 67 IN | TEMPERATURE: 96.8 F

## 2018-03-26 DIAGNOSIS — M75.122 COMPLETE TEAR OF LEFT ROTATOR CUFF: Primary | ICD-10-CM

## 2018-03-26 PROCEDURE — 99214 OFFICE O/P EST MOD 30 MIN: CPT | Performed by: ORTHOPAEDIC SURGERY

## 2018-03-26 RX ORDER — CEFAZOLIN SODIUM 2 G/100ML
2 INJECTION, SOLUTION INTRAVENOUS ONCE
Status: CANCELLED | OUTPATIENT
Start: 2018-04-12 | End: 2018-04-12

## 2018-03-26 RX ORDER — METOPROLOL SUCCINATE 25 MG/1
12.5 TABLET, EXTENDED RELEASE ORAL NIGHTLY
COMMUNITY
Start: 2018-01-22 | End: 2021-06-29

## 2018-03-27 NOTE — PROGRESS NOTES
Patient: Mukesh Beard Jr.    YOB: 1940    Medical Record Number: [id number]    Chief Complaints:  Left shoulder pain and weakness    History of Present Illness:     77 y.o. male patient who presents for follow-up of his left shoulder.  He continues to have moderate to severe constant aching pain.  His pain is worse with reaching and lifting.  He has not noticed any alleviating factors.  He struggles with overhead tasks.  He tells me he has trouble performing routine daily activities including getting dressed and putting on deodorant.  He get his recent MRI.  He presents today to review that study and discuss options.    Allergies: No Known Allergies    Home Medications:    Current Outpatient Prescriptions:   •  aspirin 81 MG tablet, Take 81 mg by mouth Daily., Disp: , Rfl:   •  atenolol (TENORMIN) 25 MG tablet, Take 12.5 mg by mouth Daily., Disp: , Rfl:   •  azelastine (ASTEPRO) 0.15 % solution nasal spray, into each nostril., Disp: , Rfl:   •  cetirizine (zyrTEC) 10 MG tablet, Take 10 mg by mouth Daily., Disp: , Rfl:   •  Cholecalciferol (VITAMIN D) 2000 UNITS capsule, Take  by mouth., Disp: , Rfl:   •  Cholecalciferol (VITAMIN D-3) 1000 units capsule, Take  by mouth., Disp: , Rfl:   •  Cinnamon 500 MG capsule, Take  by mouth. 2000mg qd, Disp: , Rfl:   •  doxycycline (VIBRAMYCIN) 100 MG capsule, Take 100 mg by mouth Daily., Disp: , Rfl:   •  Flaxseed, Linseed, (FLAX SEED OIL) 1000 MG capsule, Take 1,200 mg by mouth Daily., Disp: , Rfl:   •  loteprednol (LOTEMAX) 0.2 % suspension, As Needed., Disp: , Rfl:   •  LUMIGAN 0.01 % ophthalmic drops, , Disp: , Rfl:   •  meloxicam (MOBIC) 15 MG tablet, Take 15 mg by mouth Daily., Disp: , Rfl:   •  metoprolol tartrate (LOPRESSOR) 25 MG tablet, Take 25 mg by mouth Daily. 1/2 tablet qd, Disp: , Rfl:   •  montelukast (SINGULAIR) 10 MG tablet, Take 10 mg by mouth Every Night., Disp: , Rfl:   •  niacin 500 MG tablet, Take 1,000 mg by mouth Daily With  Breakfast., Disp: , Rfl:   •  Omega-3 Fatty Acids (FISH OIL) 1200 MG capsule capsule, Take  by mouth., Disp: , Rfl:   •  raNITIdine (ZANTAC) 300 MG tablet, Take 300 mg by mouth Daily., Disp: , Rfl:   •  rosuvastatin (CRESTOR) 20 MG tablet, Take 20 mg by mouth Daily., Disp: , Rfl:   •  metoprolol succinate XL (TOPROL-XL) 25 MG 24 hr tablet, , Disp: , Rfl:     Past Medical History:   Diagnosis Date   • Arthritis    • ASCVD (arteriosclerotic cardiovascular disease)     mild lv dysfunction   • Hyperlipidemia    • Hypertension    • Vertigo 04/2011       Past Surgical History:   Procedure Laterality Date   • APPENDECTOMY     • CATARACT EXTRACTION     • CHOLECYSTECTOMY     • CORONARY ARTERY BYPASS GRAFT  04/2011   • EPIDURAL BLOCK     • HAND SURGERY         Social History     Occupational History   • Not on file.     Social History Main Topics   • Smoking status: Former Smoker     Quit date: 1972   • Smokeless tobacco: Never Used   • Alcohol use No   • Drug use: No   • Sexual activity: Defer      Social History     Social History Narrative   • No narrative on file       Family History   Problem Relation Age of Onset   • Heart disease Mother      valvular heart disease       Review of Systems:      Constitutional: Denies fever, shaking or chills   Eyes: Denies change in visual acuity   HEENT: Denies nasal congestion or sore throat   Respiratory: Denies cough or shortness of breath   Cardiovascular: Denies chest pain or edema  Endocrine: Denies tremors, palpitations, intolerance of heat or cold, polyuria, polydipsia.  GI: Denies abdominal pain, nausea, vomiting, bloody stools or diarrhea  : Denies frequency, urgency, incontinence, retention, or nocturia.  Musculoskeletal: Denies numbness tingling or loss of motor function except as above  Integument: Denies rash, lesion or ulceration   Neurologic: Denies headache or focal weakness, deficits  Heme: Denies epistaxis, spontaneous or excessive bleeding, epistaxis, hematuria,  "melena, fatigue, enlarged or tender lymph nodes.      All other pertinent positives and negatives as noted above in HPI.    Physical Exam: 77 y.o. male  Vitals:    03/26/18 1047   Temp: 96.8 °F (36 °C)   Weight: 85.3 kg (188 lb)   Height: 170.2 cm (67\")       General:  Patient is awake and alert.  Appears in no acute distress or discomfort.    Psych:  Affect and demeanor are appropriate.    Eyes:  Conjunctiva and sclera appear grossly normal.  Eyes track well and EOM seem to be intact.    Ears:  No gross abnormalities.  Hearing adequate for the exam.    Cardiovascular:  Regular rate and rhythm.    Lungs:  Good chest expansion.  Breathing unlabored.    Extremities: Left shoulder is examined.  Skin is benign.  Mild tenderness anteriorly.  Moderate bursal effusion.  Shoulder motion is full but clearly uncomfortable.  He struggles with mid-arc elevation.  4 out of 5 strength with elevation in the scapular plane.  5 minus out of 5 strength with external rotation.  No instability.  Axillary nerve sensation is intact.  Good motor and sensory function distally with brisk capillary refill.    Imaging:  MRI of the left shoulder is reviewed along with the associated report.  There is motion artifact which limits the utility the study but it is diagnostic.  There is a full-thickness retracted tear of the supraspinatus tendon.  There appears to be grade 3 atrophy.  There are mild degenerative changes of the glenohumeral articulation.    Assessment/Plan:  Left shoulder chronic, retracted rotator cuff tendon tear    I showed himm the MRI we discussed options.  Continued conservative care is reasonable but he feels that this has been ineffective for him thus far.  He has expressed interest in pursuing surgical options.  We talked about a possible repair.  I explained that with the degree of retraction and atrophy as well as the degenerative changes, he would potentially be at high risk for re-tear.  I told him that this could be as " high as 50%.  I'm willing to offer him this but I told him that it is entirely possible that he may end up with an arthroplasty in the future.  He expressed interest in going ahead with the arthroplasty at this time.  We discussed this option in detail.   We discussed the risks, benefits, and alternatives.  He wants to pursue that in the near future.  We will look at getting that scheduled for him.  I do want to see him back for a preoperative visit.  He understands and agrees.    Ja Archer MD    03/26/2018

## 2018-04-03 ENCOUNTER — APPOINTMENT (OUTPATIENT)
Dept: PREADMISSION TESTING | Facility: HOSPITAL | Age: 78
End: 2018-04-03

## 2018-04-03 VITALS
DIASTOLIC BLOOD PRESSURE: 86 MMHG | WEIGHT: 188 LBS | RESPIRATION RATE: 20 BRPM | OXYGEN SATURATION: 97 % | HEART RATE: 46 BPM | HEIGHT: 67 IN | SYSTOLIC BLOOD PRESSURE: 169 MMHG | BODY MASS INDEX: 29.51 KG/M2 | TEMPERATURE: 97.7 F

## 2018-04-03 DIAGNOSIS — M75.122 COMPLETE TEAR OF LEFT ROTATOR CUFF: ICD-10-CM

## 2018-04-03 LAB
ANION GAP SERPL CALCULATED.3IONS-SCNC: 10.9 MMOL/L
BILIRUB UR QL STRIP: NEGATIVE
BUN BLD-MCNC: 16 MG/DL (ref 8–23)
BUN/CREAT SERPL: 19 (ref 7–25)
CALCIUM SPEC-SCNC: 9.3 MG/DL (ref 8.6–10.5)
CHLORIDE SERPL-SCNC: 104 MMOL/L (ref 98–107)
CLARITY UR: CLEAR
CO2 SERPL-SCNC: 27.1 MMOL/L (ref 22–29)
COLOR UR: YELLOW
CREAT BLD-MCNC: 0.84 MG/DL (ref 0.76–1.27)
DEPRECATED RDW RBC AUTO: 39.9 FL (ref 37–54)
ERYTHROCYTE [DISTWIDTH] IN BLOOD BY AUTOMATED COUNT: 11.9 % (ref 11.5–14.5)
GFR SERPL CREATININE-BSD FRML MDRD: 88 ML/MIN/1.73
GLUCOSE BLD-MCNC: 120 MG/DL (ref 65–99)
GLUCOSE UR STRIP-MCNC: NEGATIVE MG/DL
HCT VFR BLD AUTO: 44.6 % (ref 40.4–52.2)
HGB BLD-MCNC: 15.1 G/DL (ref 13.7–17.6)
HGB UR QL STRIP.AUTO: NEGATIVE
KETONES UR QL STRIP: NEGATIVE
LEUKOCYTE ESTERASE UR QL STRIP.AUTO: NEGATIVE
MCH RBC QN AUTO: 31.7 PG (ref 27–32.7)
MCHC RBC AUTO-ENTMCNC: 33.9 G/DL (ref 32.6–36.4)
MCV RBC AUTO: 93.5 FL (ref 79.8–96.2)
NITRITE UR QL STRIP: NEGATIVE
PH UR STRIP.AUTO: 6 [PH] (ref 5–8)
PLATELET # BLD AUTO: 165 10*3/MM3 (ref 140–500)
PMV BLD AUTO: 11.7 FL (ref 6–12)
POTASSIUM BLD-SCNC: 4.1 MMOL/L (ref 3.5–5.2)
PROT UR QL STRIP: NEGATIVE
RBC # BLD AUTO: 4.77 10*6/MM3 (ref 4.6–6)
SODIUM BLD-SCNC: 142 MMOL/L (ref 136–145)
SP GR UR STRIP: 1.02 (ref 1–1.03)
UROBILINOGEN UR QL STRIP: NORMAL
WBC NRBC COR # BLD: 5.13 10*3/MM3 (ref 4.5–10.7)

## 2018-04-03 PROCEDURE — 81003 URINALYSIS AUTO W/O SCOPE: CPT | Performed by: ORTHOPAEDIC SURGERY

## 2018-04-03 PROCEDURE — 85027 COMPLETE CBC AUTOMATED: CPT | Performed by: ORTHOPAEDIC SURGERY

## 2018-04-03 PROCEDURE — 80048 BASIC METABOLIC PNL TOTAL CA: CPT | Performed by: ORTHOPAEDIC SURGERY

## 2018-04-03 PROCEDURE — 36415 COLL VENOUS BLD VENIPUNCTURE: CPT

## 2018-04-03 RX ORDER — ACETAMINOPHEN 160 MG
2000 TABLET,DISINTEGRATING ORAL NIGHTLY
COMMUNITY
End: 2022-10-27 | Stop reason: SDUPTHER

## 2018-04-03 NOTE — DISCHARGE INSTRUCTIONS
Take the following medications the morning of surgery with a small sip of water: none        General Instructions:  • Do not eat solid food after midnight the night before surgery.  • You may drink clear liquids day of surgery but must stop at least one hour before your hospital arrival time.  • It is beneficial for you to have a clear drink that contains carbohydrates the day of surgery.  We suggest a 12 to 20 ounce bottle of Gatorade or Powerade for non-diabetic patients or a 12 to 20 ounce bottle of G2 or Powerade Zero for diabetic patients. (Pediatric patients, are not advised to drink a 12 to 20 ounce carbohydrate drink)    Clear liquids are liquids you can see through.  Nothing red in color.     Plain water                               Sports drinks  Sodas                                   Gelatin (Jell-O)  Fruit juices without pulp such as white grape juice and apple juice  Popsicles that contain no fruit or yogurt  Tea or coffee (no cream or milk added)  Gatorade / Powerade  G2 / Powerade Zero    • Infants may have breast milk up to four hours before surgery.  • Infants drinking formula may drink formula up to six hours before surgery.   • Patients who avoid smoking, chewing tobacco and alcohol for 4 weeks prior to surgery have a reduced risk of post-operative complications.  Quit smoking as many days before surgery as you can.  • Do not smoke, use chewing tobacco or drink alcohol the day of surgery.   • If applicable bring your C-PAP/ BI-PAP machine.  • Bring any papers given to you in the doctor’s office.  • Wear clean comfortable clothes and socks.  • Do not wear contact lenses or make-up.  Bring a case for your glasses.   • Bring crutches or walker if applicable.  • Remove all piercings.  Leave jewelry and any other valuables at home.  • Hair extensions with metal clips must be removed prior to surgery.  • The Pre-Admission Testing nurse will instruct you to bring medications if unable to obtain an  accurate list in Pre-Admission Testing.        If you were given a blood bank ID arm band remember to bring it with you the day of surgery.    Preventing a Surgical Site Infection:  • For 2 to 3 days before surgery, avoid shaving with a razor because the razor can irritate skin and make it easier to develop an infection.  • The night prior to surgery sleep in a clean bed with clean clothing.  Do not allow pets to sleep with you.  • Shower on the morning of surgery using a fresh bar of anti-bacterial soap (such as Dial) and clean washcloth.  Dry with a clean towel and dress in clean clothing.  • Ask your surgeon if you will be receiving antibiotics prior to surgery.  • Make sure you, your family, and all healthcare providers clean their hands with soap and water or an alcohol based hand  before caring for you or your wound.    Day of surgery:  Upon arrival, a Pre-op nurse and Anesthesiologist will review your health history, obtain vital signs, and answer questions you may have.  The only belongings needed at this time will be your home medications and if applicable your C-PAP/BI-PAP machine.  If you are staying overnight your family can leave the rest of your belongings in the car and bring them to your room later.  A Pre-op nurse will start an IV and you may receive medication in preparation for surgery, including something to help you relax.  Your family will be able to see you in the Pre-op area.  While you are in surgery your family should notify the waiting room  if they leave the waiting room area and provide a contact phone number.    Please be aware that surgery does come with discomfort.  We want to make every effort to control your discomfort so please discuss any uncontrolled symptoms with your nurse.   Your doctor will most likely have prescribed pain medications.      If you are going home after surgery you will receive individualized written care instructions before being  discharged.  A responsible adult must drive you to and from the hospital on the day of your surgery and stay with you for 24 hours.    If you are staying overnight following surgery, you will be transported to your hospital room following the recovery period.  [facility] has all private rooms.    If you have any questions please call Pre-Admission Testing at [phone].  Deductibles and co-payments are collected on the day of service. Please be prepared to pay the required co-pay, deductible or deposit on the day of service as defined by your plan.    2% CHLORAHEXIDINE GLUCONATE* CLOTH  Preparing or “prepping” skin before surgery can reduce the risk of infection at the surgical site. To make the process easier, [facility] has chosen disposable cloths moistened with a rinse-free, 2% Chlorhexidine Gluconate (CHG) antiseptic solution. The steps below outline the prepping process and should be carefully followed.        Use the prep cloth on the area that is circled in the diagram             Directions Night before Surgery  1) Shower using a fresh bar of anti-bacterial soap (such as Dial) and clean washcloth.  Use a clean towel to completely dry your skin.  2) Do not use any lotions, oils or creams on your skin.  3) Open the package and remove 1 cloth, wipe your skin for 30 seconds in a circular motion.  Allow to dry for 3 minutes.  4) Repeat #3 with second cloth.  5) Do not touch your eyes, ears, or mouth with the prep cloth.  6) Allow the wet prep solution to air dry.  7) Discard the prep cloth and wash your hands with soap and water.   8) Dress in clean bed clothes and sleep on fresh clean bed sheets.   9) You may experience some temporary itching after the prep.    Directions Day of Surgery  1) Repeat steps 1,2,3,4,5,6,7, and 9.   2) Dress in clean clothes before coming to the hospital.    BACTROBAN NASAL OINTMENT  There are many germs normally in your nose. Bactroban is an ointment that  will help reduce these germs. Please follow these instructions for Bactroban use:    ____Two days before surgery in the evening Date________    ____The day before surgery in the morning  Date________    ____The day before surgery in the evening              Date________    ____The day of surgery in the morning    Date________    **Squirt ½ package of Bactroban Ointment onto a cotton applicator and apply to inside of 1st nostril.  Squirt the remaining Bactroban and apply to the inside of the other nostril.    PERIDEX- ORAL:  Use only if your surgeon has ordered  Use the night before and morning of surgery - Swish, gargle, and spit - do not swallow.

## 2018-04-09 ENCOUNTER — OFFICE VISIT (OUTPATIENT)
Dept: ORTHOPEDIC SURGERY | Facility: CLINIC | Age: 78
End: 2018-04-09

## 2018-04-09 VITALS — BODY MASS INDEX: 30.04 KG/M2 | TEMPERATURE: 98.1 F | WEIGHT: 191.4 LBS | HEIGHT: 67 IN

## 2018-04-09 DIAGNOSIS — Z01.818 PRE-OP EVALUATION: Primary | ICD-10-CM

## 2018-04-09 PROCEDURE — S0260 H&P FOR SURGERY: HCPCS | Performed by: ORTHOPAEDIC SURGERY

## 2018-04-10 NOTE — PROGRESS NOTES
History & Physical         Patient: Mukesh Beard Jr.    YOB: 1940    Medical Record Number: [id number]    Chief Complaints:   Chief Complaint   Patient presents with   • Left Shoulder - Pre-op Exam       History of Present Illness: 77 y.o. male presents with   Chief Complaint   Patient presents with   • Left Shoulder - Pre-op Exam   Reports a long history of progressively worsening pain, motion loss, and dysfunction.  Describes current pain as severe.  Has failed prolonged conservative treatment.  Denies any new complaints or issues.    Allergies: No Known Allergies    Medications:   Home Medications:    Current Outpatient Prescriptions:   •  aspirin 81 MG tablet, Take 81 mg by mouth Every Night. Stopped 4/5/18, Disp: , Rfl:   •  cetirizine (zyrTEC) 10 MG tablet, Take 10 mg by mouth Every Morning., Disp: , Rfl:   •  Chlorhexidine Gluconate 2 % pads, Apply 1 application topically 2 (Two) Times a Day. Pre op, Disp: , Rfl:   •  Cholecalciferol (VITAMIN D3) 2000 units capsule, Take 2,000 Units by mouth Every Night., Disp: , Rfl:   •  Cinnamon 500 MG capsule, Take 500 mg by mouth Every Morning. 2000mg qd, Disp: , Rfl:   •  doxycycline (VIBRAMYCIN) 100 MG capsule, Take 100 mg by mouth Every Morning., Disp: , Rfl:   •  Flaxseed, Linseed, (FLAX SEED OIL) 1000 MG capsule, Take 1,200 mg by mouth Every Morning. Stopped 4/5/18, Disp: , Rfl:   •  LUMIGAN 0.01 % ophthalmic drops, Administer 1 drop to both eyes Every Night., Disp: , Rfl:   •  meloxicam (MOBIC) 15 MG tablet, Take 15 mg by mouth Every Night. Stopped 4/5/18, Disp: , Rfl:   •  metoprolol succinate XL (TOPROL-XL) 25 MG 24 hr tablet, 25 mg Every Night. 1/2 tab each dose, Disp: , Rfl:   •  mupirocin (BACTROBAN) 2 % ointment, Apply 1 application topically 3 (Three) Times a Day., Disp: , Rfl:   •  niacin 500 MG tablet, Take 1,000 mg by mouth Every Night., Disp: , Rfl:   •  Omega-3 Fatty Acids (FISH OIL) 1200 MG capsule capsule, Take 1,200 mg by mouth  Daily With Breakfast. Stopped 4/5/18, Disp: , Rfl:   •  raNITIdine (ZANTAC) 300 MG tablet, Take 300 mg by mouth Every Night., Disp: , Rfl:   •  rosuvastatin (CRESTOR) 20 MG tablet, Take 20 mg by mouth Every Night., Disp: , Rfl:     Past Medical History:   Diagnosis Date   • Anesthesia complication     son woke up during surgery  knee replacement   • Arthritis    • ASCVD (arteriosclerotic cardiovascular disease)     mild lv dysfunction   • Diabetes mellitus     high b/s diet controlled   • GERD (gastroesophageal reflux disease)    • Hyperlipidemia    • Hypertension    • 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)    • Ringing in ear, bilateral    • Vertigo 04/2011          Past Surgical History:   Procedure Laterality Date   • APPENDECTOMY     • CATARACT EXTRACTION     • CHOLECYSTECTOMY     • CORONARY ARTERY BYPASS GRAFT  04/2011   • EPIDURAL BLOCK     • EYE SURGERY      rt tear duct sx   • HAND SURGERY            Social History     Occupational History   • Not on file.     Social History Main Topics   • Smoking status: Former Smoker     Types: Cigarettes     Quit date: 1972   • Smokeless tobacco: Never Used   • Alcohol use No   • Drug use: No   • Sexual activity: Defer      Social History     Social History Narrative   • No narrative on file          Family History   Problem Relation Age of Onset   • Heart disease Mother      valvular heart disease   • Malig Hyperthermia Neg Hx        Review of Systems:     Constitutional:  Denies fever, shaking or chills   Eyes:  Denies change in visual acuity   HEENT:  Denies nasal congestion or sore throat   Respiratory:  Denies cough or shortness of breath   Cardiovascular:  Denies chest pain or edema   GI:  Denies abdominal pain, nausea, vomiting, bloody stools or diarrhea   Musculoskeletal:  Denies numbness tingling or loss of motor function except as outlined above in history of present illness.  Integument:  Denies rash, lesion or ulceration   Neurologic:  Denies  headache or focal weakness, deficits      All other pertinent positives and negatives as noted above in HPI.    Physical Exam: 77 y.o. male     General:  Patient is awake and alert.  Appears in no acute distress or discomfort.    Psych:  Affect and demeanor are appropriate.    Eyes:  Conjunctiva and sclera appear grossly normal.  Eyes track well and EOM seem to be intact.    Ears:  No gross abnormalities.  Hearing adequate for the exam.    Cardiovascular:  Regular rate and rhythm.    Lungs:  Good chest expansion.  Breathing unlabored.    Lymph:  No palpable adenopathy about neck or axilla.    Neck:  Supple.  Normal ROM.  Negative Spurling's for shoulder or arm pain.    Left upper extremity:  Skin benign and intact without evidence for swelling, masses or atrophy.  No palpable masses.  No focal areas of exquisite tenderness.  Shoulder ROM limited and uncomfortable--forward elevation is 150°, external rotation 40°, internal rotation to T12.  4 out of 5 strength with elevation in the scapular plane, 5 minus out of 5 strength with external rotation.  No evident instability or apprehension.  Hornblowers negative.  ER lag sign negative.  Good strength in deltoid, wrist and hand.  Intact sensation in arm, hand.  Palpable radial pulse with brisk cap refill.    Diagnostic Tests:  Lab Results   Component Value Date    GLUCOSE 120 (H) 04/03/2018    CALCIUM 9.3 04/03/2018     04/03/2018    K 4.1 04/03/2018    CO2 27.1 04/03/2018     04/03/2018    BUN 16 04/03/2018    CREATININE 0.84 04/03/2018    EGFRIFNONA 88 04/03/2018    BCR 19.0 04/03/2018    ANIONGAP 10.9 04/03/2018     Lab Results   Component Value Date    WBC 5.13 04/03/2018    HGB 15.1 04/03/2018    HCT 44.6 04/03/2018    MCV 93.5 04/03/2018     04/03/2018     No results found for: INR, PROTIME     Previous x-rays and MRI the left shoulder are again reviewed.  The x-rays show no profound abnormalities.  The MRI shows a large, retracted rotator cuff  tendon tear with mild glenohumeral osteoarthritis    Assessment:  Left shoulder chronic, retracted rotator cuff tendon tear    Plan: We had a thorough discussion regarding the risks, benefits and alternatives to an arthroplasty and non-surgical management versus surgery.  We also discussed the option of attempted surgical fixation.  He does not want to take the chance that he may have to have a second surgery for the replacement and prefers to proceed with the replacement from the outset.  I explained that surgical risks include infection, hematoma, hardware related complications including failure of fixation, loosening, fracture, persistent pain and/or loss of motion, iatrogenic nerve and/or blood vessel injury resulting in permanent weakness, numbness or dysfunction, DVT, PE, positioning related neuropraxia, and anesthesia related complications resulting in death.  We discussed the indication for a reverse as opposed to a standard total shoulder and the risks inherent to the reverse including notching, glenoid loosening, instability, and traction related neuropraxia, any of which could result in persistent pain or problems requiring further surgery.  Lastly, we discussed the rehab and all that will be expected of the patient post operatively to ensure an optimal outcome.  The patient voiced understanding of the risks, benefits, and alternative forms of treatment that were discussed and the patient consents to proceed with the reverse arthroplasty.        Date: 4/9/2018    Ja Archer MD

## 2018-04-12 ENCOUNTER — ANESTHESIA (OUTPATIENT)
Dept: PERIOP | Facility: HOSPITAL | Age: 78
End: 2018-04-12

## 2018-04-12 ENCOUNTER — APPOINTMENT (OUTPATIENT)
Dept: GENERAL RADIOLOGY | Facility: HOSPITAL | Age: 78
End: 2018-04-12

## 2018-04-12 ENCOUNTER — ANESTHESIA EVENT (OUTPATIENT)
Dept: PERIOP | Facility: HOSPITAL | Age: 78
End: 2018-04-12

## 2018-04-12 ENCOUNTER — HOSPITAL ENCOUNTER (INPATIENT)
Facility: HOSPITAL | Age: 78
LOS: 1 days | Discharge: HOME | End: 2018-04-13
Attending: ORTHOPAEDIC SURGERY | Admitting: ORTHOPAEDIC SURGERY

## 2018-04-12 DIAGNOSIS — M75.122 COMPLETE TEAR OF LEFT ROTATOR CUFF: ICD-10-CM

## 2018-04-12 PROBLEM — M12.819 ROTATOR CUFF ARTHROPATHY: Status: ACTIVE | Noted: 2018-04-12

## 2018-04-12 PROCEDURE — 25010000002 PHENYLEPHRINE PER 1 ML: Performed by: NURSE ANESTHETIST, CERTIFIED REGISTERED

## 2018-04-12 PROCEDURE — 25010000003 CEFAZOLIN IN DEXTROSE 2-4 GM/100ML-% SOLUTION: Performed by: ORTHOPAEDIC SURGERY

## 2018-04-12 PROCEDURE — 0RRK00Z REPLACEMENT OF LEFT SHOULDER JOINT WITH REVERSE BALL AND SOCKET SYNTHETIC SUBSTITUTE, OPEN APPROACH: ICD-10-PCS | Performed by: ORTHOPAEDIC SURGERY

## 2018-04-12 PROCEDURE — 25010000002 FENTANYL CITRATE (PF) 100 MCG/2ML SOLUTION: Performed by: ANESTHESIOLOGY

## 2018-04-12 PROCEDURE — 25010000002 MIDAZOLAM PER 1 MG: Performed by: ANESTHESIOLOGY

## 2018-04-12 PROCEDURE — 25010000002 ONDANSETRON PER 1 MG: Performed by: NURSE ANESTHETIST, CERTIFIED REGISTERED

## 2018-04-12 PROCEDURE — 25010000002 PROPOFOL 10 MG/ML EMULSION: Performed by: NURSE ANESTHETIST, CERTIFIED REGISTERED

## 2018-04-12 PROCEDURE — 23472 RECONSTRUCT SHOULDER JOINT: CPT | Performed by: ORTHOPAEDIC SURGERY

## 2018-04-12 PROCEDURE — C1776 JOINT DEVICE (IMPLANTABLE): HCPCS | Performed by: ORTHOPAEDIC SURGERY

## 2018-04-12 PROCEDURE — 73020 X-RAY EXAM OF SHOULDER: CPT

## 2018-04-12 PROCEDURE — 25010000002 VANCOMYCIN 10 G RECONSTITUTED SOLUTION: Performed by: ORTHOPAEDIC SURGERY

## 2018-04-12 DEVICE — SCRW FIX LK HEX 4.75X15MM: Type: IMPLANTABLE DEVICE | Site: SHOULDER | Status: FUNCTIONAL

## 2018-04-12 DEVICE — BEAR HUM COMPRNSV ARCOMXL STD 44 36: Type: IMPLANTABLE DEVICE | Site: SHOULDER | Status: FUNCTIONAL

## 2018-04-12 DEVICE — IMPLANTABLE DEVICE: Type: IMPLANTABLE DEVICE | Site: SHOULDER | Status: FUNCTIONAL

## 2018-04-12 DEVICE — STEM HUM/SHLDR COMPREHENSIVE MINI 10X83MM: Type: IMPLANTABLE DEVICE | Site: SHOULDER | Status: FUNCTIONAL

## 2018-04-12 DEVICE — BASEPLT GLEN COMPR MINI W TPR ADAPTR 25: Type: IMPLANTABLE DEVICE | Site: SHOULDER | Status: FUNCTIONAL

## 2018-04-12 DEVICE — SCRW COMPRNSV CNTRL 6.5X30MM REUS: Type: IMPLANTABLE DEVICE | Site: SHOULDER | Status: FUNCTIONAL

## 2018-04-12 DEVICE — SCRW FIX LK HEX 4.75X25MM: Type: IMPLANTABLE DEVICE | Site: SHOULDER | Status: FUNCTIONAL

## 2018-04-12 DEVICE — GLENOSPHERE VERSA DIAL STD TI 36MM: Type: IMPLANTABLE DEVICE | Site: SHOULDER | Status: FUNCTIONAL

## 2018-04-12 DEVICE — PIN STNMAN 3.2MM 9IN: Type: IMPLANTABLE DEVICE | Site: SHOULDER | Status: FUNCTIONAL

## 2018-04-12 DEVICE — TRY HUM STD COBLT 44MM: Type: IMPLANTABLE DEVICE | Site: SHOULDER | Status: FUNCTIONAL

## 2018-04-12 DEVICE — SCRW FIX LK HEX 4.75X20MM: Type: IMPLANTABLE DEVICE | Site: SHOULDER | Status: FUNCTIONAL

## 2018-04-12 RX ORDER — EPHEDRINE SULFATE 50 MG/ML
INJECTION, SOLUTION INTRAVENOUS AS NEEDED
Status: DISCONTINUED | OUTPATIENT
Start: 2018-04-12 | End: 2018-04-12 | Stop reason: SURG

## 2018-04-12 RX ORDER — PROPOFOL 10 MG/ML
VIAL (ML) INTRAVENOUS AS NEEDED
Status: DISCONTINUED | OUTPATIENT
Start: 2018-04-12 | End: 2018-04-12 | Stop reason: SURG

## 2018-04-12 RX ORDER — HYDROMORPHONE HCL 110MG/55ML
0.5 PATIENT CONTROLLED ANALGESIA SYRINGE INTRAVENOUS
Status: DISCONTINUED | OUTPATIENT
Start: 2018-04-12 | End: 2018-04-12 | Stop reason: HOSPADM

## 2018-04-12 RX ORDER — LABETALOL HYDROCHLORIDE 5 MG/ML
5 INJECTION, SOLUTION INTRAVENOUS
Status: DISCONTINUED | OUTPATIENT
Start: 2018-04-12 | End: 2018-04-12 | Stop reason: HOSPADM

## 2018-04-12 RX ORDER — ACETAMINOPHEN 325 MG/1
325 TABLET ORAL EVERY 4 HOURS PRN
Status: DISCONTINUED | OUTPATIENT
Start: 2018-04-12 | End: 2018-04-13 | Stop reason: HOSPADM

## 2018-04-12 RX ORDER — SODIUM CHLORIDE 9 MG/ML
100 INJECTION, SOLUTION INTRAVENOUS CONTINUOUS
Status: DISCONTINUED | OUTPATIENT
Start: 2018-04-12 | End: 2018-04-13 | Stop reason: HOSPADM

## 2018-04-12 RX ORDER — FENTANYL CITRATE 50 UG/ML
50 INJECTION, SOLUTION INTRAMUSCULAR; INTRAVENOUS
Status: DISCONTINUED | OUTPATIENT
Start: 2018-04-12 | End: 2018-04-12 | Stop reason: HOSPADM

## 2018-04-12 RX ORDER — ONDANSETRON 4 MG/1
4 TABLET, ORALLY DISINTEGRATING ORAL EVERY 6 HOURS PRN
Status: DISCONTINUED | OUTPATIENT
Start: 2018-04-12 | End: 2018-04-13 | Stop reason: HOSPADM

## 2018-04-12 RX ORDER — MELOXICAM 15 MG/1
15 TABLET ORAL NIGHTLY
Status: DISCONTINUED | OUTPATIENT
Start: 2018-04-12 | End: 2018-04-13 | Stop reason: HOSPADM

## 2018-04-12 RX ORDER — NALOXONE HCL 0.4 MG/ML
0.2 VIAL (ML) INJECTION AS NEEDED
Status: DISCONTINUED | OUTPATIENT
Start: 2018-04-12 | End: 2018-04-12 | Stop reason: HOSPADM

## 2018-04-12 RX ORDER — SODIUM CHLORIDE 0.9 % (FLUSH) 0.9 %
1-10 SYRINGE (ML) INJECTION AS NEEDED
Status: DISCONTINUED | OUTPATIENT
Start: 2018-04-12 | End: 2018-04-12 | Stop reason: HOSPADM

## 2018-04-12 RX ORDER — PROMETHAZINE HYDROCHLORIDE 25 MG/ML
12.5 INJECTION, SOLUTION INTRAMUSCULAR; INTRAVENOUS ONCE AS NEEDED
Status: DISCONTINUED | OUTPATIENT
Start: 2018-04-12 | End: 2018-04-12 | Stop reason: HOSPADM

## 2018-04-12 RX ORDER — HYDROMORPHONE HYDROCHLORIDE 1 MG/ML
0.5 INJECTION, SOLUTION INTRAMUSCULAR; INTRAVENOUS; SUBCUTANEOUS
Status: DISCONTINUED | OUTPATIENT
Start: 2018-04-12 | End: 2018-04-13 | Stop reason: HOSPADM

## 2018-04-12 RX ORDER — MAGNESIUM HYDROXIDE 1200 MG/15ML
LIQUID ORAL AS NEEDED
Status: DISCONTINUED | OUTPATIENT
Start: 2018-04-12 | End: 2018-04-12 | Stop reason: HOSPADM

## 2018-04-12 RX ORDER — METOPROLOL SUCCINATE 25 MG/1
12.5 TABLET, EXTENDED RELEASE ORAL NIGHTLY
Status: DISCONTINUED | OUTPATIENT
Start: 2018-04-12 | End: 2018-04-13 | Stop reason: HOSPADM

## 2018-04-12 RX ORDER — ROCURONIUM BROMIDE 10 MG/ML
INJECTION, SOLUTION INTRAVENOUS AS NEEDED
Status: DISCONTINUED | OUTPATIENT
Start: 2018-04-12 | End: 2018-04-12 | Stop reason: SURG

## 2018-04-12 RX ORDER — CEFAZOLIN SODIUM 2 G/100ML
2 INJECTION, SOLUTION INTRAVENOUS ONCE
Status: COMPLETED | OUTPATIENT
Start: 2018-04-12 | End: 2018-04-12

## 2018-04-12 RX ORDER — CEFAZOLIN SODIUM 2 G/100ML
2 INJECTION, SOLUTION INTRAVENOUS EVERY 8 HOURS
Status: COMPLETED | OUTPATIENT
Start: 2018-04-12 | End: 2018-04-13

## 2018-04-12 RX ORDER — ONDANSETRON 2 MG/ML
4 INJECTION INTRAMUSCULAR; INTRAVENOUS ONCE AS NEEDED
Status: DISCONTINUED | OUTPATIENT
Start: 2018-04-12 | End: 2018-04-12 | Stop reason: HOSPADM

## 2018-04-12 RX ORDER — OXYCODONE AND ACETAMINOPHEN 7.5; 325 MG/1; MG/1
1 TABLET ORAL EVERY 4 HOURS PRN
Status: DISCONTINUED | OUTPATIENT
Start: 2018-04-12 | End: 2018-04-13 | Stop reason: HOSPADM

## 2018-04-12 RX ORDER — FLUMAZENIL 0.1 MG/ML
0.2 INJECTION INTRAVENOUS AS NEEDED
Status: DISCONTINUED | OUTPATIENT
Start: 2018-04-12 | End: 2018-04-12 | Stop reason: HOSPADM

## 2018-04-12 RX ORDER — TRANEXAMIC ACID 100 MG/ML
INJECTION, SOLUTION INTRAVENOUS AS NEEDED
Status: DISCONTINUED | OUTPATIENT
Start: 2018-04-12 | End: 2018-04-12 | Stop reason: SURG

## 2018-04-12 RX ORDER — FAMOTIDINE 10 MG/ML
20 INJECTION, SOLUTION INTRAVENOUS ONCE
Status: COMPLETED | OUTPATIENT
Start: 2018-04-12 | End: 2018-04-12

## 2018-04-12 RX ORDER — MIDAZOLAM HYDROCHLORIDE 1 MG/ML
1 INJECTION INTRAMUSCULAR; INTRAVENOUS
Status: DISCONTINUED | OUTPATIENT
Start: 2018-04-12 | End: 2018-04-12 | Stop reason: HOSPADM

## 2018-04-12 RX ORDER — ONDANSETRON 2 MG/ML
4 INJECTION INTRAMUSCULAR; INTRAVENOUS EVERY 6 HOURS PRN
Status: DISCONTINUED | OUTPATIENT
Start: 2018-04-12 | End: 2018-04-13 | Stop reason: HOSPADM

## 2018-04-12 RX ORDER — LIDOCAINE HYDROCHLORIDE 10 MG/ML
0.5 INJECTION, SOLUTION EPIDURAL; INFILTRATION; INTRACAUDAL; PERINEURAL ONCE AS NEEDED
Status: DISCONTINUED | OUTPATIENT
Start: 2018-04-12 | End: 2018-04-12 | Stop reason: HOSPADM

## 2018-04-12 RX ORDER — ONDANSETRON 4 MG/1
4 TABLET, FILM COATED ORAL EVERY 6 HOURS PRN
Status: DISCONTINUED | OUTPATIENT
Start: 2018-04-12 | End: 2018-04-13 | Stop reason: HOSPADM

## 2018-04-12 RX ORDER — MIDAZOLAM HYDROCHLORIDE 1 MG/ML
2 INJECTION INTRAMUSCULAR; INTRAVENOUS
Status: DISCONTINUED | OUTPATIENT
Start: 2018-04-12 | End: 2018-04-12 | Stop reason: HOSPADM

## 2018-04-12 RX ORDER — PROMETHAZINE HYDROCHLORIDE 25 MG/1
25 SUPPOSITORY RECTAL ONCE AS NEEDED
Status: DISCONTINUED | OUTPATIENT
Start: 2018-04-12 | End: 2018-04-12 | Stop reason: HOSPADM

## 2018-04-12 RX ORDER — LIDOCAINE HYDROCHLORIDE 20 MG/ML
INJECTION, SOLUTION INFILTRATION; PERINEURAL AS NEEDED
Status: DISCONTINUED | OUTPATIENT
Start: 2018-04-12 | End: 2018-04-12 | Stop reason: SURG

## 2018-04-12 RX ORDER — ONDANSETRON 2 MG/ML
INJECTION INTRAMUSCULAR; INTRAVENOUS AS NEEDED
Status: DISCONTINUED | OUTPATIENT
Start: 2018-04-12 | End: 2018-04-12 | Stop reason: SURG

## 2018-04-12 RX ORDER — HYDRALAZINE HYDROCHLORIDE 20 MG/ML
5 INJECTION INTRAMUSCULAR; INTRAVENOUS
Status: DISCONTINUED | OUTPATIENT
Start: 2018-04-12 | End: 2018-04-12 | Stop reason: HOSPADM

## 2018-04-12 RX ORDER — DOCUSATE SODIUM 100 MG/1
100 CAPSULE, LIQUID FILLED ORAL 2 TIMES DAILY PRN
Status: DISCONTINUED | OUTPATIENT
Start: 2018-04-12 | End: 2018-04-13 | Stop reason: HOSPADM

## 2018-04-12 RX ORDER — PROMETHAZINE HYDROCHLORIDE 25 MG/1
12.5 TABLET ORAL ONCE AS NEEDED
Status: DISCONTINUED | OUTPATIENT
Start: 2018-04-12 | End: 2018-04-12 | Stop reason: HOSPADM

## 2018-04-12 RX ORDER — SODIUM CHLORIDE, SODIUM LACTATE, POTASSIUM CHLORIDE, CALCIUM CHLORIDE 600; 310; 30; 20 MG/100ML; MG/100ML; MG/100ML; MG/100ML
9 INJECTION, SOLUTION INTRAVENOUS CONTINUOUS
Status: DISCONTINUED | OUTPATIENT
Start: 2018-04-12 | End: 2018-04-12 | Stop reason: HOSPADM

## 2018-04-12 RX ORDER — OXYCODONE AND ACETAMINOPHEN 7.5; 325 MG/1; MG/1
1 TABLET ORAL ONCE AS NEEDED
Status: DISCONTINUED | OUTPATIENT
Start: 2018-04-12 | End: 2018-04-12 | Stop reason: HOSPADM

## 2018-04-12 RX ORDER — HYDROCODONE BITARTRATE AND ACETAMINOPHEN 7.5; 325 MG/1; MG/1
1 TABLET ORAL ONCE AS NEEDED
Status: DISCONTINUED | OUTPATIENT
Start: 2018-04-12 | End: 2018-04-12 | Stop reason: HOSPADM

## 2018-04-12 RX ORDER — EPHEDRINE SULFATE 50 MG/ML
5 INJECTION, SOLUTION INTRAVENOUS ONCE AS NEEDED
Status: DISCONTINUED | OUTPATIENT
Start: 2018-04-12 | End: 2018-04-12 | Stop reason: HOSPADM

## 2018-04-12 RX ORDER — NALOXONE HCL 0.4 MG/ML
0.1 VIAL (ML) INJECTION
Status: DISCONTINUED | OUTPATIENT
Start: 2018-04-12 | End: 2018-04-13 | Stop reason: HOSPADM

## 2018-04-12 RX ORDER — ROSUVASTATIN CALCIUM 20 MG/1
20 TABLET, COATED ORAL NIGHTLY
Status: DISCONTINUED | OUTPATIENT
Start: 2018-04-12 | End: 2018-04-13 | Stop reason: HOSPADM

## 2018-04-12 RX ORDER — FAMOTIDINE 20 MG/1
20 TABLET, FILM COATED ORAL DAILY
Status: DISCONTINUED | OUTPATIENT
Start: 2018-04-12 | End: 2018-04-13 | Stop reason: HOSPADM

## 2018-04-12 RX ORDER — PROMETHAZINE HYDROCHLORIDE 25 MG/1
25 TABLET ORAL ONCE AS NEEDED
Status: DISCONTINUED | OUTPATIENT
Start: 2018-04-12 | End: 2018-04-12 | Stop reason: HOSPADM

## 2018-04-12 RX ORDER — BISACODYL 10 MG
10 SUPPOSITORY, RECTAL RECTAL DAILY PRN
Status: DISCONTINUED | OUTPATIENT
Start: 2018-04-12 | End: 2018-04-13 | Stop reason: HOSPADM

## 2018-04-12 RX ORDER — DIPHENHYDRAMINE HYDROCHLORIDE 50 MG/ML
12.5 INJECTION INTRAMUSCULAR; INTRAVENOUS
Status: DISCONTINUED | OUTPATIENT
Start: 2018-04-12 | End: 2018-04-12 | Stop reason: HOSPADM

## 2018-04-12 RX ADMIN — CEFAZOLIN SODIUM 2 G: 2 INJECTION, SOLUTION INTRAVENOUS at 20:19

## 2018-04-12 RX ADMIN — PROPOFOL 140 MG: 10 INJECTION, EMULSION INTRAVENOUS at 12:04

## 2018-04-12 RX ADMIN — METOPROLOL SUCCINATE 12.5 MG: 25 TABLET, FILM COATED, EXTENDED RELEASE ORAL at 20:19

## 2018-04-12 RX ADMIN — PHENYLEPHRINE HYDROCHLORIDE 100 MCG: 10 INJECTION INTRAVENOUS at 12:53

## 2018-04-12 RX ADMIN — ONDANSETRON 4 MG: 2 INJECTION INTRAMUSCULAR; INTRAVENOUS at 13:00

## 2018-04-12 RX ADMIN — VANCOMYCIN HYDROCHLORIDE 1250 MG: 10 INJECTION, POWDER, LYOPHILIZED, FOR SOLUTION INTRAVENOUS at 22:55

## 2018-04-12 RX ADMIN — VANCOMYCIN HYDROCHLORIDE 1250 MG: 10 INJECTION, POWDER, LYOPHILIZED, FOR SOLUTION INTRAVENOUS at 10:51

## 2018-04-12 RX ADMIN — EPHEDRINE SULFATE 10 MG: 50 INJECTION INTRAMUSCULAR; INTRAVENOUS; SUBCUTANEOUS at 12:13

## 2018-04-12 RX ADMIN — PHENYLEPHRINE HYDROCHLORIDE 100 MCG: 10 INJECTION INTRAVENOUS at 13:03

## 2018-04-12 RX ADMIN — EPHEDRINE SULFATE 10 MG: 50 INJECTION INTRAMUSCULAR; INTRAVENOUS; SUBCUTANEOUS at 12:18

## 2018-04-12 RX ADMIN — CEFAZOLIN SODIUM 2 G: 2 INJECTION, SOLUTION INTRAVENOUS at 11:49

## 2018-04-12 RX ADMIN — FAMOTIDINE 20 MG: 10 INJECTION INTRAVENOUS at 08:58

## 2018-04-12 RX ADMIN — FENTANYL CITRATE 50 MCG: 50 INJECTION INTRAMUSCULAR; INTRAVENOUS at 10:37

## 2018-04-12 RX ADMIN — ROCURONIUM BROMIDE 40 MG: 10 INJECTION INTRAVENOUS at 12:04

## 2018-04-12 RX ADMIN — SUGAMMADEX 200 MG: 100 INJECTION, SOLUTION INTRAVENOUS at 13:07

## 2018-04-12 RX ADMIN — Medication 1 MG: at 10:36

## 2018-04-12 RX ADMIN — ROSUVASTATIN CALCIUM 20 MG: 20 TABLET, FILM COATED ORAL at 20:19

## 2018-04-12 RX ADMIN — SODIUM CHLORIDE, POTASSIUM CHLORIDE, SODIUM LACTATE AND CALCIUM CHLORIDE: 600; 310; 30; 20 INJECTION, SOLUTION INTRAVENOUS at 13:09

## 2018-04-12 RX ADMIN — LIDOCAINE HYDROCHLORIDE 60 MG: 20 INJECTION, SOLUTION INFILTRATION; PERINEURAL at 12:04

## 2018-04-12 RX ADMIN — EPHEDRINE SULFATE 10 MG: 50 INJECTION INTRAMUSCULAR; INTRAVENOUS; SUBCUTANEOUS at 12:11

## 2018-04-12 RX ADMIN — MUPIROCIN 1 APPLICATION: 20 OINTMENT TOPICAL at 20:19

## 2018-04-12 RX ADMIN — OXYCODONE HYDROCHLORIDE AND ACETAMINOPHEN 1 TABLET: 7.5; 325 TABLET ORAL at 21:26

## 2018-04-12 RX ADMIN — SODIUM CHLORIDE, POTASSIUM CHLORIDE, SODIUM LACTATE AND CALCIUM CHLORIDE 9 ML/HR: 600; 310; 30; 20 INJECTION, SOLUTION INTRAVENOUS at 08:57

## 2018-04-12 RX ADMIN — TRANEXAMIC ACID 1000 MG: 100 INJECTION, SOLUTION INTRAVENOUS at 12:31

## 2018-04-12 RX ADMIN — PHENYLEPHRINE HYDROCHLORIDE 100 MCG: 10 INJECTION INTRAVENOUS at 12:41

## 2018-04-12 RX ADMIN — EPHEDRINE SULFATE 10 MG: 50 INJECTION INTRAMUSCULAR; INTRAVENOUS; SUBCUTANEOUS at 12:36

## 2018-04-12 RX ADMIN — EPHEDRINE SULFATE 10 MG: 50 INJECTION INTRAMUSCULAR; INTRAVENOUS; SUBCUTANEOUS at 12:33

## 2018-04-12 RX ADMIN — Medication 1 MG: at 08:58

## 2018-04-12 NOTE — H&P (VIEW-ONLY)
History & Physical         Patient: Mukesh Beard Jr.    YOB: 1940    Medical Record Number: [id number]    Chief Complaints:   Chief Complaint   Patient presents with   • Left Shoulder - Pre-op Exam       History of Present Illness: 77 y.o. male presents with   Chief Complaint   Patient presents with   • Left Shoulder - Pre-op Exam   Reports a long history of progressively worsening pain, motion loss, and dysfunction.  Describes current pain as severe.  Has failed prolonged conservative treatment.  Denies any new complaints or issues.    Allergies: No Known Allergies    Medications:   Home Medications:    Current Outpatient Prescriptions:   •  aspirin 81 MG tablet, Take 81 mg by mouth Every Night. Stopped 4/5/18, Disp: , Rfl:   •  cetirizine (zyrTEC) 10 MG tablet, Take 10 mg by mouth Every Morning., Disp: , Rfl:   •  Chlorhexidine Gluconate 2 % pads, Apply 1 application topically 2 (Two) Times a Day. Pre op, Disp: , Rfl:   •  Cholecalciferol (VITAMIN D3) 2000 units capsule, Take 2,000 Units by mouth Every Night., Disp: , Rfl:   •  Cinnamon 500 MG capsule, Take 500 mg by mouth Every Morning. 2000mg qd, Disp: , Rfl:   •  doxycycline (VIBRAMYCIN) 100 MG capsule, Take 100 mg by mouth Every Morning., Disp: , Rfl:   •  Flaxseed, Linseed, (FLAX SEED OIL) 1000 MG capsule, Take 1,200 mg by mouth Every Morning. Stopped 4/5/18, Disp: , Rfl:   •  LUMIGAN 0.01 % ophthalmic drops, Administer 1 drop to both eyes Every Night., Disp: , Rfl:   •  meloxicam (MOBIC) 15 MG tablet, Take 15 mg by mouth Every Night. Stopped 4/5/18, Disp: , Rfl:   •  metoprolol succinate XL (TOPROL-XL) 25 MG 24 hr tablet, 25 mg Every Night. 1/2 tab each dose, Disp: , Rfl:   •  mupirocin (BACTROBAN) 2 % ointment, Apply 1 application topically 3 (Three) Times a Day., Disp: , Rfl:   •  niacin 500 MG tablet, Take 1,000 mg by mouth Every Night., Disp: , Rfl:   •  Omega-3 Fatty Acids (FISH OIL) 1200 MG capsule capsule, Take 1,200 mg by mouth  Daily With Breakfast. Stopped 4/5/18, Disp: , Rfl:   •  raNITIdine (ZANTAC) 300 MG tablet, Take 300 mg by mouth Every Night., Disp: , Rfl:   •  rosuvastatin (CRESTOR) 20 MG tablet, Take 20 mg by mouth Every Night., Disp: , Rfl:     Past Medical History:   Diagnosis Date   • Anesthesia complication     son woke up during surgery  knee replacement   • Arthritis    • ASCVD (arteriosclerotic cardiovascular disease)     mild lv dysfunction   • Diabetes mellitus     high b/s diet controlled   • GERD (gastroesophageal reflux disease)    • Hyperlipidemia    • Hypertension    • 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)    • Ringing in ear, bilateral    • Vertigo 04/2011          Past Surgical History:   Procedure Laterality Date   • APPENDECTOMY     • CATARACT EXTRACTION     • CHOLECYSTECTOMY     • CORONARY ARTERY BYPASS GRAFT  04/2011   • EPIDURAL BLOCK     • EYE SURGERY      rt tear duct sx   • HAND SURGERY            Social History     Occupational History   • Not on file.     Social History Main Topics   • Smoking status: Former Smoker     Types: Cigarettes     Quit date: 1972   • Smokeless tobacco: Never Used   • Alcohol use No   • Drug use: No   • Sexual activity: Defer      Social History     Social History Narrative   • No narrative on file          Family History   Problem Relation Age of Onset   • Heart disease Mother      valvular heart disease   • Malig Hyperthermia Neg Hx        Review of Systems:     Constitutional:  Denies fever, shaking or chills   Eyes:  Denies change in visual acuity   HEENT:  Denies nasal congestion or sore throat   Respiratory:  Denies cough or shortness of breath   Cardiovascular:  Denies chest pain or edema   GI:  Denies abdominal pain, nausea, vomiting, bloody stools or diarrhea   Musculoskeletal:  Denies numbness tingling or loss of motor function except as outlined above in history of present illness.  Integument:  Denies rash, lesion or ulceration   Neurologic:  Denies  headache or focal weakness, deficits      All other pertinent positives and negatives as noted above in HPI.    Physical Exam: 77 y.o. male     General:  Patient is awake and alert.  Appears in no acute distress or discomfort.    Psych:  Affect and demeanor are appropriate.    Eyes:  Conjunctiva and sclera appear grossly normal.  Eyes track well and EOM seem to be intact.    Ears:  No gross abnormalities.  Hearing adequate for the exam.    Cardiovascular:  Regular rate and rhythm.    Lungs:  Good chest expansion.  Breathing unlabored.    Lymph:  No palpable adenopathy about neck or axilla.    Neck:  Supple.  Normal ROM.  Negative Spurling's for shoulder or arm pain.    Left upper extremity:  Skin benign and intact without evidence for swelling, masses or atrophy.  No palpable masses.  No focal areas of exquisite tenderness.  Shoulder ROM limited and uncomfortable--forward elevation is 150°, external rotation 40°, internal rotation to T12.  4 out of 5 strength with elevation in the scapular plane, 5 minus out of 5 strength with external rotation.  No evident instability or apprehension.  Hornblowers negative.  ER lag sign negative.  Good strength in deltoid, wrist and hand.  Intact sensation in arm, hand.  Palpable radial pulse with brisk cap refill.    Diagnostic Tests:  Lab Results   Component Value Date    GLUCOSE 120 (H) 04/03/2018    CALCIUM 9.3 04/03/2018     04/03/2018    K 4.1 04/03/2018    CO2 27.1 04/03/2018     04/03/2018    BUN 16 04/03/2018    CREATININE 0.84 04/03/2018    EGFRIFNONA 88 04/03/2018    BCR 19.0 04/03/2018    ANIONGAP 10.9 04/03/2018     Lab Results   Component Value Date    WBC 5.13 04/03/2018    HGB 15.1 04/03/2018    HCT 44.6 04/03/2018    MCV 93.5 04/03/2018     04/03/2018     No results found for: INR, PROTIME     Previous x-rays and MRI the left shoulder are again reviewed.  The x-rays show no profound abnormalities.  The MRI shows a large, retracted rotator cuff  tendon tear with mild glenohumeral osteoarthritis    Assessment:  Left shoulder chronic, retracted rotator cuff tendon tear    Plan: We had a thorough discussion regarding the risks, benefits and alternatives to an arthroplasty and non-surgical management versus surgery.  We also discussed the option of attempted surgical fixation.  He does not want to take the chance that he may have to have a second surgery for the replacement and prefers to proceed with the replacement from the outset.  I explained that surgical risks include infection, hematoma, hardware related complications including failure of fixation, loosening, fracture, persistent pain and/or loss of motion, iatrogenic nerve and/or blood vessel injury resulting in permanent weakness, numbness or dysfunction, DVT, PE, positioning related neuropraxia, and anesthesia related complications resulting in death.  We discussed the indication for a reverse as opposed to a standard total shoulder and the risks inherent to the reverse including notching, glenoid loosening, instability, and traction related neuropraxia, any of which could result in persistent pain or problems requiring further surgery.  Lastly, we discussed the rehab and all that will be expected of the patient post operatively to ensure an optimal outcome.  The patient voiced understanding of the risks, benefits, and alternative forms of treatment that were discussed and the patient consents to proceed with the reverse arthroplasty.        Date: 4/9/2018    Ja Archer MD

## 2018-04-12 NOTE — ANESTHESIA PROCEDURE NOTES
Airway  Urgency: elective    Airway not difficult    General Information and Staff    Patient location during procedure: OR  Anesthesiologist: BENNETT VARGAS  CRNA: LISET MCCORMICK    Indications and Patient Condition  Indications for airway management: airway protection    Preoxygenated: yes  Mask difficulty assessment: 1 - vent by mask    Final Airway Details  Final airway type: endotracheal airway      Successful airway: ETT  Cuffed: yes   Successful intubation technique: direct laryngoscopy  Facilitating devices/methods: intubating stylet  Endotracheal tube insertion site: oral  Blade: Johnson  Blade size: #2  ETT size: 7.5 mm  Cormack-Lehane Classification: grade I - full view of glottis  Placement verified by: chest auscultation and capnometry   Cuff volume (mL): 7  Measured from: lips  ETT to lips (cm): 22  Number of attempts at approach: 1    Additional Comments  EBBS: ETCO2+: Atraumatic intubation; Lips and teeth same as pre op

## 2018-04-12 NOTE — SIGNIFICANT NOTE
04/12/18 1610   Rehab Time/Intention   Evaluation Not Performed other (see comments)  (Pt educated on post-op TSA exercises. Provided handout and utilized teach back method. Pt verbalized understanding. Pt encouraged to speak with nursing if he has further questions. PT will sign off.)   Rehab Treatment   Discipline physical therapist

## 2018-04-12 NOTE — ANESTHESIA PREPROCEDURE EVALUATION
Anesthesia Evaluation     Patient summary reviewed and Nursing notes reviewed   NPO Solid Status: > 8 hours  NPO Liquid Status: > 4 hours           Airway   Mallampati: II  TM distance: >3 FB  Neck ROM: full  no difficulty expected  Dental - normal exam     Pulmonary - normal exam   Cardiovascular - normal exam    (+) hypertension, CABG, hyperlipidemia,       Neuro/Psych  (+) headaches, dizziness/light headedness,     GI/Hepatic/Renal/Endo    (+)  GERD,  diabetes mellitus type 2 well controlled,     Musculoskeletal     Abdominal  - normal exam   Substance History      OB/GYN          Other   (+) arthritis                     Anesthesia Plan    ASA 3     general   (ISB)  intravenous induction   Anesthetic plan and risks discussed with patient.

## 2018-04-12 NOTE — OP NOTE
Orthopaedic Operative Note    Facility: [facility]    Patient: Mukesh Beard Jr.    Medical Record Number: [id number]    YOB: 1940    Dictating Surgeon: Ja Archer M.D.    Primary Care Physician: Alexandra Barnes MD    Date of Operation:  4/12/2018    PREOPERATIVE DIAGNOSIS:  Left shoulder rotator cuff tear          POSTOPERATIVE DIAGNOSIS: Left shoulder rotator cuff tear          PROCEDURES PERFORMED:   1.  Left reverse total shoulder arthroplasty     2. Tenodesis of the long head of the biceps tendon.       SURGEON: Ja Archer MD      ASSISTANT: Cornelius Flynn (First Assist)      ANESTHESIA:    Regional followed by General.          COMPLICATIONS: None.       SPECIMEN: None.       ESTIMATED BLOOD LOSS: Less than 100 mL.       IMPLANTS:    1. Biomet 10 mm mini comprehensive humeral stem with 44 standard tray and 36 standard   humeral bearing.    2. Size 25 mm mini glenoid baseplate with one 6.5 mm central compression screw   and 4 peripheral 4.75 mm locking screws.    3. Size 36 mm Glenosphere.      INDICATIONS: The patient has a long history of left shoulder pain and weakness   which has been nonresponse to conservative treatment.  I explained that surgical risks include infection, hematoma, hardware related complications including failure of fixation, loosening, fracture, persistent pain and/or loss of motion, iatrogenic nerve and/or blood vessel injury resulting in permanent weakness, numbness or dysfunction, DVT, PE, positioning related neuropraxia, and anesthesia related complications resulting in death.  We discussed the indication for a reverse as opposed to a standard total shoulder and the risks inherent to the reverse including notching, glenoid loosening, instability, and traction related neuropraxia, any of which could result in persistent pain or problems requiring further surgery.                        DESCRIPTION OF PROCEDURE: The patient and  operative site were identified in the   preoperative holding area. The surgical site was marked. Following this, adequate   regional anesthesia of the right upper extremity was administered. The patient   was then taken to the operating room and placed in the supine position.   Adequate general anesthesia was administered and then the patient was repositioned on   the operating table in the modified beach chair position. The head and neck were   placed in neutral alignment and all bony prominences were carefully padded and   protected. Once we had appropriate position, a timeout was taken, preoperative   antibiotics were administered. The extremity was cleaned with an alcohol   solution. A Hibiclens scrub was performed and then the extremity was prepped   with 2 ChloraPreps. I allowed this to dry for 3 minutes before the draping   procedure was carried out. Again, a timeout had been taken and preoperative   antibiotics administered prior to incision.     I fashioned an approximately 6 cm incision over the anterior aspect of the shoulder,   carried that down through the skin and subcutaneous tissues. Full-thickness medial and   lateral skin flaps were developed. The cephalic vein was dissected out and this structure   was retracted laterally. This was kept protected throughout the duration of the   case. The underlying clavipectoral fascia was divided. The deltoid muscle was   retracted laterally and the strap muscles retracted medially. The long head of   the biceps was dissected out of the groove. This was released off of its   attachment to the supraglenoid tubercle. The diseased intra-articular portion   of the biceps was removed and then the long head of the biceps was tenodesed to   the pectoralis tendon using multiple MaxBraid sutures.        The patient had a large, retracted rotator cuff tear involving the supraspinatus and infraspinatus along with the upper subscapularis.  There was also arthrosis of the  visible   portions of the humeral head.       The remaining subscapularis was released off the attachment to   the lesser tuberosity and the humeral head exposed. The periarticular   osteophytes were carefully removed with a rongeur. I then inserted the cutting   guide. This was pinned into position, set to 20 degrees of retroversion as referenced   off the forearm. The head cut was carried out in typical fashion and the cut   portion of bone removed.     Once I completed that process, I directed my attention   to the glenoid. The axillary nerve was dissected out and this structure was   identified and protected. Once I had that structure protected, the anterior,   posterior, and inferior glenoid retractors were carefully positioned. Superior,   anterior, and inferior glenoid labral tissue was carefully removed to allow for   exposure of the caudal rim of the glenoid to ensure correct positioning of the   baseplate.     The centering guide for the baseplate was inserted, the center pin   was drilled. The glenoid was then reamed and prepared in typical fashion,   careful to maintain appropriate inferior tilt of the component. With the   glenoid sided preparations completed, the baseplate was impacted into position.   It seated well. It was secured with a single central compression screw along   with the 4 peripheral locking screws. The compression screw got great purchase   and the 4 locking screws were confirmed to lock very nicely into the plate. The   baseplate seemed to be well fixed.     I trialed with a 36 Glenosphere. It fit well.   I did have to adjust the offset to allow for adequate   inferior overhang to hopefully minimize the risk of notching. The trial   component was removed, final component impacted into position. It seated well.   I confirmed that was well seated by pulling up on it circumferentially with a   right angle clamp. When doing so, the entire scapula seemed to move. With the   baseplate and  glenoid well seated, I then directed my attention to the humerus.     The humerus was reamed and broached up to an 10 which fit well. The trial   component was removed, final component impacted into position, taking care to   maintain appropriate retroversion as referenced off the forearm. I trialed off   of the stem then placed the final implants. The 36 standard fit the best and restored excellent    motion and stability. There was no impingement and I did check the stability in multiple   positions of internal and external rotation with axial loading.  Once again,    the components were confirmed to be stable, and again, the shoulder   demonstrated excellent motion.       The wound was irrigated out with 500 ccs of a betadine-containing   saline solution. I then irrigated with 3 L of sterile saline mixed with bacitracin via    pulsatile lavage. I made sure that we had good hemostasis. The wound was then   sequentially closed in layers using interrupted Vicryl for the deep tissues and a running   subcuticular Monocryl stitch for the skin followed by Dermabond. Sterile   dressings were applied to the wound and the drapes withdrawn. The arm was   placed in a shoulder immobilizer. The patient was awakened, transferred to a   standard bed, and taken to the recovery room. The patient tolerated the procedure well.   There were no complications. The patient was taken to the recovery room in good   condition.          Ja Archer M.D.*   4/12/2018     cc: Alexandra Barnes MD

## 2018-04-12 NOTE — ANESTHESIA POSTPROCEDURE EVALUATION
Patient: Mukesh Beard Jr.    Procedure Summary     Date:  04/12/18 Room / Location:  [facility] OR  / [facility] MAIN OR    Anesthesia Start:  1149 Anesthesia Stop:  1330    Procedure:  Reverse Total Shoulder Arthroplsty (Left Shoulder) Diagnosis:       Complete tear of left rotator cuff      (Complete tear of left rotator cuff [M75.122])    Surgeon:  Ja Archer MD Provider:  Gil Brady MD    Anesthesia Type:  general ASA Status:  3          Anesthesia Type: general  Last vitals  BP   109/68 (04/12/18 1415)   Temp   36.5 °C (97.7 °F) (04/12/18 1415)   Pulse   65 (04/12/18 1415)   Resp   14 (04/12/18 1415)     SpO2   97 % (04/12/18 1415)     Post Anesthesia Care and Evaluation    Patient location during evaluation: PACU  Patient participation: complete - patient participated  Level of consciousness: awake and alert  Pain management: adequate  Airway patency: patent  Anesthetic complications: No anesthetic complications    Cardiovascular status: acceptable  Respiratory status: acceptable  Hydration status: acceptable    Comments: ---------------------------               04/12/18                      1415         ---------------------------   BP:          109/68         Pulse:         65           Resp:          14           Temp:   36.5 °C (97.7 °F)   SpO2:          97%         ---------------------------

## 2018-04-12 NOTE — BRIEF OP NOTE
TOTAL SHOULDER REVERSE ARTHROPLASTY  Progress Note    Mukesh Beard   4/12/2018    Pre-op Diagnosis:   Complete tear of left rotator cuff [M75.122]       Post-Op Diagnosis Codes:     * Complete tear of left rotator cuff [M75.122]    Procedure/CPT® Codes:      Procedure(s):  Reverse Total Shoulder Arthroplasty, biceps tenodesis    Surgeon(s):  Ja Archer MD    Anesthesia: General with Block    Staff:   Circulator: Ish Yung RN; Kaley Gibson RN  Scrub Person: Shameka Rivera RN; Luisa Coles    Estimated Blood Loss: minimal    Urine Voided: * No values recorded between 4/12/2018 11:47 AM and 4/12/2018  1:03 PM *    Specimens:                None      Drains:      Findings: see dictation    Complications: none      Ja Archer MD     Date: 4/12/2018  Time: 1:03 PM

## 2018-04-12 NOTE — ANESTHESIA PROCEDURE NOTES
Peripheral Block    Patient location during procedure: holding area  Start time: 4/12/2018 10:40 AM  Stop time: 4/12/2018 10:45 AM  Reason for block: at surgeon's request and post-op pain management  Performed by  Anesthesiologist: BLANCA NG  Preanesthetic Checklist  Completed: patient identified, site marked, surgical consent, pre-op evaluation, timeout performed, IV checked, risks and benefits discussed and monitors and equipment checked  Prep:  Pt Position: supine  Sterile barriers:cap and gloves  Prep: ChloraPrep  Patient monitoring: blood pressure monitoring, continuous pulse oximetry and EKG  Procedure  Sedation:yes  Performed under: local infiltration  Guidance:ultrasound guided  Images:still images obtained    Block Type:interscalene  Injection Technique:single-shot  Needle Type:long-bevel  Needle Gauge:20 G    Medications  Local Injected:ropivacaine 0.5% Local Amount Injected:30mL  Post Assessment  Injection Assessment: negative aspiration for heme, no paresthesia on injection and incremental injection  Patient Tolerance:comfortable throughout block  Complications:no

## 2018-04-12 NOTE — PERIOPERATIVE NURSING NOTE
Informed Dr Archer of pt last doses of supplements and pt on doxycycline for rosacea of the eyes-pt on maintenance dose and has been taking for 2-3 months

## 2018-04-13 VITALS
BODY MASS INDEX: 28.96 KG/M2 | RESPIRATION RATE: 16 BRPM | WEIGHT: 184.5 LBS | HEIGHT: 67 IN | TEMPERATURE: 98.9 F | OXYGEN SATURATION: 97 % | SYSTOLIC BLOOD PRESSURE: 114 MMHG | HEART RATE: 89 BPM | DIASTOLIC BLOOD PRESSURE: 76 MMHG

## 2018-04-13 LAB
HCT VFR BLD AUTO: 42.8 % (ref 40.4–52.2)
HGB BLD-MCNC: 13.9 G/DL (ref 13.7–17.6)

## 2018-04-13 PROCEDURE — 25010000003 CEFAZOLIN IN DEXTROSE 2-4 GM/100ML-% SOLUTION: Performed by: ORTHOPAEDIC SURGERY

## 2018-04-13 PROCEDURE — 25010000002 HYDROMORPHONE PER 4 MG: Performed by: ORTHOPAEDIC SURGERY

## 2018-04-13 PROCEDURE — 85014 HEMATOCRIT: CPT | Performed by: ORTHOPAEDIC SURGERY

## 2018-04-13 PROCEDURE — 85018 HEMOGLOBIN: CPT | Performed by: ORTHOPAEDIC SURGERY

## 2018-04-13 RX ORDER — OXYCODONE AND ACETAMINOPHEN 7.5; 325 MG/1; MG/1
TABLET ORAL
Qty: 60 TABLET | Refills: 0
Start: 2018-04-13 | End: 2018-11-02

## 2018-04-13 RX ORDER — OXYCODONE AND ACETAMINOPHEN 7.5; 325 MG/1; MG/1
2 TABLET ORAL EVERY 4 HOURS PRN
Status: DISCONTINUED | OUTPATIENT
Start: 2018-04-13 | End: 2018-04-13 | Stop reason: HOSPADM

## 2018-04-13 RX ADMIN — HYDROMORPHONE HYDROCHLORIDE 0.5 MG: 1 INJECTION, SOLUTION INTRAMUSCULAR; INTRAVENOUS; SUBCUTANEOUS at 05:37

## 2018-04-13 RX ADMIN — MUPIROCIN: 20 OINTMENT TOPICAL at 09:06

## 2018-04-13 RX ADMIN — OXYCODONE HYDROCHLORIDE AND ACETAMINOPHEN 1 TABLET: 7.5; 325 TABLET ORAL at 09:06

## 2018-04-13 RX ADMIN — OXYCODONE HYDROCHLORIDE AND ACETAMINOPHEN 1 TABLET: 7.5; 325 TABLET ORAL at 05:34

## 2018-04-13 RX ADMIN — OXYCODONE HYDROCHLORIDE AND ACETAMINOPHEN 2 TABLET: 7.5; 325 TABLET ORAL at 12:47

## 2018-04-13 RX ADMIN — FAMOTIDINE 20 MG: 20 TABLET, FILM COATED ORAL at 09:06

## 2018-04-13 RX ADMIN — CEFAZOLIN SODIUM 2 G: 2 INJECTION, SOLUTION INTRAVENOUS at 03:52

## 2018-04-13 RX ADMIN — OXYCODONE HYDROCHLORIDE AND ACETAMINOPHEN 1 TABLET: 7.5; 325 TABLET ORAL at 01:26

## 2018-04-13 NOTE — DISCHARGE SUMMARY
Date of Admission:  4/12/2018    Date of Discharge:  4/13/2018    Discharge Diagnosis: s/p Left reverse total shoulder arthroplasty    Admitting Physician: Ja Archer    Consults: none    DETAILS OF HOSPITAL STAY:  Patient is a 77 y.o. male was admitted to the floor following a reverse total shoulder arthroplasty.  Post-operatively the patient was transferred to the post-operative floor where the patient underwent physical therapy including both active and passive ROM exercises. Opioids were titrated to achieve appropriate pain management to allow for participation in mobilization exercises. Vital signs are now stable. The incision is benign without signs or symptoms of infection. Operative extremity neurovascular status remains intact. Appropriate education re: incision care, activity levels, medications, and follow up visits was completed and all questions were answered. The patient is now deemed stable for discharge to home.    Condition on Discharge:  Stable    Discharge Medications   Mukesh Beard Jr.   Home Medication Instructions [id number]    Printed on:04/13/18 4651   Medication Information                      aspirin 81 MG tablet  Take 1 tablet by mouth Every Night. Resume 4/14/18             cetirizine (zyrTEC) 10 MG tablet  Take 10 mg by mouth Every Morning.             Cholecalciferol (VITAMIN D3) 2000 units capsule  Take 2,000 Units by mouth Every Night.             Cinnamon 500 MG capsule  Take 500 mg by mouth Every Morning. 2000mg qd             doxycycline (VIBRAMYCIN) 100 MG capsule  Take 100 mg by mouth Every Morning.             Flaxseed, Linseed, (FLAX SEED OIL) 1000 MG capsule  Take 1,200 mg by mouth Every Morning. Stopped 4/5/18             loteprednol (ALREX) 0.2 % suspension  Administer 1 drop to both eyes 2 (Two) Times a Day.             LUMIGAN 0.01 % ophthalmic drops  Administer 1 drop to both eyes Every Night.             meloxicam (MOBIC) 15 MG tablet  Take 15 mg by mouth  Every Night. Stopped 4/5/18             metoprolol succinate XL (TOPROL-XL) 25 MG 24 hr tablet  Take 12.5 mg by mouth Every Night.             niacin 500 MG tablet  Take 1,000 mg by mouth Every Night.             Omega-3 Fatty Acids (FISH OIL) 1200 MG capsule capsule  Take 1,200 mg by mouth Daily With Breakfast. Stopped 4/5/18             oxyCODONE-acetaminophen (PERCOCET) 7.5-325 MG per tablet  Take 1-2 tabs po q 4 hours prn pain             raNITIdine (ZANTAC) 300 MG tablet  Take 300 mg by mouth Every Night.             rosuvastatin (CRESTOR) 20 MG tablet  Take 20 mg by mouth Every Night.                 Discharge Diet: regular    Activity at Discharge: as tolerated    Discharge Instructions:   1)  Patient is to continue with physical therapy exercises daily and continue working with the physical therapist as ordered.  2)  Continue to follow precautions as instructed.   3)  Patient is instructed to continue use of the sling except when performing their physical therapy exercising and while dressing or showering.  4)  Continue IRENE hose daily and ice regularly.  Patient also instructed on incentive spirometer during hospitalization and encouraged to continue to use at home regularly.   5)  The dressing should be left in place. If waterproof dressing is intact the patient may shower immediately following discharge. If the dressing becomes disloged or saturated it should be changed and patient must wait until POD #5 to shower. If dressing is changed, apply dry sterile dressing after showering.  6)  Follow up appointment in 2 weeks - patient to call the office at [phone] to schedule.     Follow-up Appointments  2 weeks with MD Laurie Elam RN  04/13/18  10:45 AM

## 2018-04-13 NOTE — PAYOR COMM NOTE
"Mukesh Breaux Jr. (77 y.o. Male)     Date of Birth Social Security Number Address Home Phone MRN    1940  [address] [place] [address] [phone] [id number]    [employer] Marital Status          [employer]        Admission Date Admission Type Admitting Provider Attending Provider Department, Room/Bed    4/12/18 Elective Ja Archer MD  [address], P876/1    Discharge Date Discharge Disposition Discharge Destination        4/13/2018 Home or Self Care              Attending Provider:  (none)   Allergies:  No Known Allergies    Isolation:  None   Infection:  None   Code Status:  FULL    Ht:  170.2 cm (67\")   Wt:  83.7 kg (184 lb 8 oz)    Admission Cmt:  None   Principal Problem:  Complete tear of left rotator cuff [M75.122] More...                 Active Insurance as of 4/12/2018     Primary Coverage     Payor Plan Insurance Group Employer/Plan Group    [employer] MEDICARE REPLACEMENT A[employer] [id number]     Payor Plan Address Payor Plan Phone Number Effective From Effective To    [address] [phone] 1/1/2017     [place] [address]       Subscriber Name Subscriber Birth Date Member ID       MUKESH BREAUX JR. 1940 [id number]                 Emergency Contacts      (Rel.) Home Phone Work Phone Mobile Phone    Shantell Breaux (Spouse) [phone] -- --               Discharge Summary      Ja Archer MD at 4/13/2018 10:45 AM          Date of Admission:  4/12/2018    Date of Discharge:  4/13/2018    Discharge Diagnosis: s/p Left reverse total shoulder arthroplasty    Admitting Physician: Ja Archer    Consults: none    DETAILS OF HOSPITAL STAY:  Patient is a 77 y.o. male was admitted to the floor following a reverse total shoulder arthroplasty.  Post-operatively the patient was transferred to the post-operative floor where the patient underwent physical therapy including both active and passive ROM exercises. Opioids were " titrated to achieve appropriate pain management to allow for participation in mobilization exercises. Vital signs are now stable. The incision is benign without signs or symptoms of infection. Operative extremity neurovascular status remains intact. Appropriate education re: incision care, activity levels, medications, and follow up visits was completed and all questions were answered. The patient is now deemed stable for discharge to home.    Condition on Discharge:  Stable    Discharge Medications   Mukesh Beard Jr.   Home Medication Instructions [id number]    Printed on:04/13/18 9668   Medication Information                      aspirin 81 MG tablet  Take 1 tablet by mouth Every Night. Resume 4/14/18             cetirizine (zyrTEC) 10 MG tablet  Take 10 mg by mouth Every Morning.             Cholecalciferol (VITAMIN D3) 2000 units capsule  Take 2,000 Units by mouth Every Night.             Cinnamon 500 MG capsule  Take 500 mg by mouth Every Morning. 2000mg qd             doxycycline (VIBRAMYCIN) 100 MG capsule  Take 100 mg by mouth Every Morning.             Flaxseed, Linseed, (FLAX SEED OIL) 1000 MG capsule  Take 1,200 mg by mouth Every Morning. Stopped 4/5/18             loteprednol (ALREX) 0.2 % suspension  Administer 1 drop to both eyes 2 (Two) Times a Day.             LUMIGAN 0.01 % ophthalmic drops  Administer 1 drop to both eyes Every Night.             meloxicam (MOBIC) 15 MG tablet  Take 15 mg by mouth Every Night. Stopped 4/5/18             metoprolol succinate XL (TOPROL-XL) 25 MG 24 hr tablet  Take 12.5 mg by mouth Every Night.             niacin 500 MG tablet  Take 1,000 mg by mouth Every Night.             Omega-3 Fatty Acids (FISH OIL) 1200 MG capsule capsule  Take 1,200 mg by mouth Daily With Breakfast. Stopped 4/5/18             oxyCODONE-acetaminophen (PERCOCET) 7.5-325 MG per tablet  Take 1-2 tabs po q 4 hours prn pain             raNITIdine (ZANTAC) 300 MG tablet  Take 300 mg by  mouth Every Night.             rosuvastatin (CRESTOR) 20 MG tablet  Take 20 mg by mouth Every Night.                 Discharge Diet: regular    Activity at Discharge: as tolerated    Discharge Instructions:   1)  Patient is to continue with physical therapy exercises daily and continue working with the physical therapist as ordered.  2)  Continue to follow precautions as instructed.   3)  Patient is instructed to continue use of the sling except when performing their physical therapy exercising and while dressing or showering.  4)  Continue IRENE hose daily and ice regularly.  Patient also instructed on incentive spirometer during hospitalization and encouraged to continue to use at home regularly.   5)  The dressing should be left in place. If waterproof dressing is intact the patient may shower immediately following discharge. If the dressing becomes disloged or saturated it should be changed and patient must wait until POD #5 to shower. If dressing is changed, apply dry sterile dressing after showering.  6)  Follow up appointment in 2 weeks - patient to call the office at [phone] to schedule.     Follow-up Appointments  2 weeks with MD Laurie Elam RN  04/13/18  10:45 AM    Electronically signed by Ja Archer MD at 4/13/2018  1:07 PM

## 2018-04-13 NOTE — PLAN OF CARE
Problem: Patient Care Overview  Goal: Plan of Care Review  Outcome: Ongoing (interventions implemented as appropriate)   04/12/18 2010   Coping/Psychosocial   Plan of Care Reviewed With patient   Plan of Care Review   Progress improving   OTHER   Outcome Summary Pt s/p left TSA. Vitals WNL, dressing CDI, pain controlled. 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     Goal: Individualization and Mutuality  Outcome: Ongoing (interventions implemented as appropriate)    Goal: Discharge Needs Assessment  Outcome: Ongoing (interventions implemented as appropriate)    Goal: Interprofessional Rounds/Family Conf  Outcome: Ongoing (interventions implemented as appropriate)      Problem: Fall Risk (Adult)  Goal: Identify Related Risk Factors and Signs and Symptoms  Outcome: Outcome(s) achieved Date Met: 04/12/18    Goal: Absence of Fall  Outcome: Ongoing (interventions implemented as appropriate)      Problem: Shoulder Arthroplasty (Adult)  Goal: Signs and Symptoms of Listed Potential Problems Will be Absent, Minimized or Managed (Shoulder Arthroplasty)  Outcome: Ongoing (interventions implemented as appropriate)    Goal: Anesthesia/Sedation Recovery  Outcome: Ongoing (interventions implemented as appropriate)

## 2018-04-13 NOTE — PLAN OF CARE
Problem: Patient Care Overview  Goal: Plan of Care Review  Outcome: Ongoing (interventions implemented as appropriate)   04/13/18 0444   Coping/Psychosocial   Plan of Care Reviewed With patient   Plan of Care Review   Progress improving   OTHER   Outcome Summary Pt has done well through the night. Sensation has returned to LUE, sling in place. Pain well controlled with PO pain meds. Pt ambulating without difficulty, voiding adequately. Plans to d/c home later today. BP stable and education provided on BP monitoring with hx.      Goal: Individualization and Mutuality  Outcome: Ongoing (interventions implemented as appropriate)    Goal: Discharge Needs Assessment   04/13/18 0444   Discharge Needs Assessment   Readmission Within the Last 30 Days no previous admission in last 30 days   Concerns to be Addressed basic needs   Patient/Family Anticipates Transition to home with family   Patient/Family Anticipated Services at Transition none   Transportation Concerns car, none   Transportation Anticipated family or friend will provide   Anticipated Changes Related to Illness none   Equipment Needed After Discharge sling   Disability   Equipment Currently Used at Home none     Goal: Interprofessional Rounds/Family Conf  Outcome: Ongoing (interventions implemented as appropriate)   04/13/18 0444   Interdisciplinary Rounds/Family Conf   Participants nursing;patient       Problem: Fall Risk (Adult)  Goal: Absence of Fall  Outcome: Ongoing (interventions implemented as appropriate)   04/13/18 0444   Fall Risk (Adult)   Absence of Fall achieves outcome       Problem: Shoulder Arthroplasty (Adult)  Goal: Signs and Symptoms of Listed Potential Problems Will be Absent, Minimized or Managed (Shoulder Arthroplasty)  Outcome: Ongoing (interventions implemented as appropriate)   04/13/18 0444   Goal/Outcome Evaluation   Problems Assessed (Shoulder Arthro) postoperative nausea and vomiting;functional deficit;pain   Problems Present  (Shoulder Arthro) pain;functional deficit     Goal: Anesthesia/Sedation Recovery  Outcome: Ongoing (interventions implemented as appropriate)   04/13/18 3607   Goal/Outcome Evaluation   Anesthesia/Sedation Recovery progressing toward baseline

## 2018-04-27 ENCOUNTER — OFFICE VISIT (OUTPATIENT)
Dept: ORTHOPEDIC SURGERY | Facility: CLINIC | Age: 78
End: 2018-04-27

## 2018-04-27 VITALS — WEIGHT: 184 LBS | TEMPERATURE: 98.7 F | HEIGHT: 67 IN | BODY MASS INDEX: 28.88 KG/M2

## 2018-04-27 DIAGNOSIS — Z96.612 S/P REVERSE TOTAL SHOULDER ARTHROPLASTY, LEFT: Primary | ICD-10-CM

## 2018-04-27 DIAGNOSIS — Z09 SURGERY FOLLOW-UP: ICD-10-CM

## 2018-04-27 PROCEDURE — 99024 POSTOP FOLLOW-UP VISIT: CPT | Performed by: ORTHOPAEDIC SURGERY

## 2018-04-27 PROCEDURE — 73030 X-RAY EXAM OF SHOULDER: CPT | Performed by: ORTHOPAEDIC SURGERY

## 2018-04-27 RX ORDER — OXYCODONE AND ACETAMINOPHEN 7.5; 325 MG/1; MG/1
1 TABLET ORAL EVERY 4 HOURS PRN
Qty: 50 TABLET | Refills: 0 | Status: SHIPPED | OUTPATIENT
Start: 2018-04-27 | End: 2018-05-18 | Stop reason: SDUPTHER

## 2018-04-27 NOTE — PROGRESS NOTES
"Mukesh Beard Jr. : 1940 MRN: [id number] DATE: 2018    DIAGNOSIS:  2 week follow up left shoulder arthroplasty      SUBJECTIVE:  Patient returns today for 2 week follow up of left shoulder replacement. Patient reports doing well with no unusual complaints.      OBJECTIVE:    Temp 98.7 °F (37.1 °C) (Temporal Artery )   Ht 170.2 cm (67\")   Wt 83.5 kg (184 lb)   BMI 28.82 kg/m²     Exam:. The incision is well approximated. No erythema or drainage. Shoulder moves fluidly with pendulums . The arm is soft and nontender.  Good strength in hand and wrist.  Distal sensation intact.  Palpable radial pulse.    DIAGNOSTIC STUDIES    Xrays: 2 views of the left shoulder (AP, scapular Y) were ordered and reviewed for evaluation of shoulder replacement.  They demonstrate a well positioned, well aligned replacement without complicating factors noted.  In comparison with previous films, there has been no change.    ASSESSMENT: 2 week follow up left shoulder replacement.    PLAN:   1.  Begin PT per protocol--prescription given along with 2 copies of my protocol.  2.  Continue sling until next visit.  3.  Counseled patient about appropriate activity modifications and restrictions, including no driving at this point.    Ja Archer MD    "

## 2018-05-18 ENCOUNTER — OFFICE VISIT (OUTPATIENT)
Dept: ORTHOPEDIC SURGERY | Facility: CLINIC | Age: 78
End: 2018-05-18

## 2018-05-18 VITALS — WEIGHT: 185 LBS | BODY MASS INDEX: 29.03 KG/M2 | TEMPERATURE: 98.6 F | HEIGHT: 67 IN

## 2018-05-18 DIAGNOSIS — Z96.612 STATUS POST REVERSE TOTAL REPLACEMENT OF LEFT SHOULDER: Primary | ICD-10-CM

## 2018-05-18 PROCEDURE — 99024 POSTOP FOLLOW-UP VISIT: CPT | Performed by: ORTHOPAEDIC SURGERY

## 2018-05-18 PROCEDURE — 73030 X-RAY EXAM OF SHOULDER: CPT | Performed by: ORTHOPAEDIC SURGERY

## 2018-05-20 NOTE — PROGRESS NOTES
"Mukesh Beard Jr. : 1940 MRN: [id number] DATE: 2018    DIAGNOSIS: 6 week follow up left shoulder arthroplasty      SUBJECTIVE:  Patient returns today for 6 week follow up of left shoulder replacement. Patient reports doing well with no unusual complaints.     OBJECTIVE:    Temp 98.6 °F (37 °C)   Ht 170.2 cm (67\")   Wt 83.9 kg (185 lb)   BMI 28.98 kg/m²     Exam:. The incision is well healed. No erythema or drainage. Shoulder moves fluidly with pendulums.  Motion is on track per protocol.  The arm is soft and nontender.  Good motor and sensory function.  Palpable distal pulses.     DIAGNOSTIC STUDIES    Xrays: 2 views of the left shoulder (AP, scapular Y) were ordered and reviewed for evaluation of shoulder replacement. They demonstrate a well positioned, well aligned replacement without complicating factors noted. In comparison with previous films there has been no change.    ASSESSMENT: 6 week follow up left shoulder replacement.    PLAN:   1.  Continue PT per protocol.  2.  Discontinue sling and begin working on progressing ROM as tolerated.  3.  Counseled patient about appropriate activity modifications and restrictions--released to drive at this point.    Ja Archer MD    "

## 2018-06-11 ENCOUNTER — OFFICE VISIT CONVERTED (OUTPATIENT)
Dept: FAMILY MEDICINE CLINIC | Age: 78
End: 2018-06-11
Attending: FAMILY MEDICINE

## 2018-07-27 ENCOUNTER — OFFICE VISIT (OUTPATIENT)
Dept: ORTHOPEDIC SURGERY | Facility: CLINIC | Age: 78
End: 2018-07-27

## 2018-07-27 VITALS — HEIGHT: 67 IN | WEIGHT: 185 LBS | BODY MASS INDEX: 29.03 KG/M2 | TEMPERATURE: 98.5 F

## 2018-07-27 DIAGNOSIS — Z96.612 H/O TOTAL SHOULDER REPLACEMENT, LEFT: Primary | ICD-10-CM

## 2018-07-27 PROCEDURE — 73030 X-RAY EXAM OF SHOULDER: CPT | Performed by: ORTHOPAEDIC SURGERY

## 2018-07-27 PROCEDURE — 99212 OFFICE O/P EST SF 10 MIN: CPT | Performed by: ORTHOPAEDIC SURGERY

## 2018-07-27 RX ORDER — MONTELUKAST SODIUM 10 MG/1
10 TABLET ORAL NIGHTLY
COMMUNITY
End: 2019-11-18

## 2018-07-29 NOTE — PROGRESS NOTES
Chief complaint: Follow-up status post left reverse total shoulder arthroplasty    Mr. Beard is now over 3 months out from surgery.  He reports no significant pain.  His biggest complaint is residual weakness.  He tells me that he can use the right arm to fully raise the left but he is still struggling to raise the left on his own.  He does admit that his active motion is better than before surgery.    Incision is healed.  No effusion or tenderness.  Excellent passive motion.  Actively, he can elevate to about 140° and externally rotated about 20.  When I raise it for him or externally rotate for him, he can maintain it.    AP, scapular Y and axillary views of the left shoulder are ordered and reviewed.  These are compared to previous x-rays.  The implants appear well fixed and well positioned.  No complicating process noted.    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  I explained that I have seen it take up to a year to recover full motion and strength.  I've encouraged him to be patient and continue to work on his therapy.  I will see him back in 6 months.

## 2018-11-02 ENCOUNTER — OFFICE VISIT (OUTPATIENT)
Dept: ORTHOPEDIC SURGERY | Facility: CLINIC | Age: 78
End: 2018-11-02

## 2018-11-02 VITALS — HEIGHT: 67 IN | TEMPERATURE: 98.9 F | BODY MASS INDEX: 29.03 KG/M2 | WEIGHT: 185 LBS

## 2018-11-02 DIAGNOSIS — G56.00 CARPAL TUNNEL SYNDROME, UNSPECIFIED LATERALITY: ICD-10-CM

## 2018-11-02 DIAGNOSIS — Z96.612 HX OF TOTAL SHOULDER REPLACEMENT, LEFT: Primary | ICD-10-CM

## 2018-11-02 DIAGNOSIS — M19.019 ARTHRITIS OF SHOULDER: ICD-10-CM

## 2018-11-02 PROCEDURE — 99212 OFFICE O/P EST SF 10 MIN: CPT | Performed by: ORTHOPAEDIC SURGERY

## 2018-11-02 PROCEDURE — 73030 X-RAY EXAM OF SHOULDER: CPT | Performed by: ORTHOPAEDIC SURGERY

## 2018-11-02 NOTE — PROGRESS NOTES
Chief Complaint:  Follow-up status post left reverse total shoulder arthroplasty;  new complaint of left hand intermittent numbness and tingling    HPI:  Mr. Beard comes in today for follow-up of his left shoulder.  He says that he is doing well overall.  He has recovered the ability to reach up overhead and lifting things out away from his body.  This is something that he could not do before surgery.  He has been experiencing intermittent pain over the top of the shoulder and occasional popping with certain movements.  This pain is mild and only associated with certain movements, particularly reaching across his body.  He is happy with his outcome from the shoulder surgery at this point.    He does mention a new issue of intermittent left hand numbness and tingling.  The symptoms are worse in the mornings.  Denies any weakness or constant numbness.    Exam:  Left shoulder is examined.  His incision is healed.  Mild tenderness over the acromioclavicular joint.  He has very good active motion.  Forward elevation of 160°, external rotation 30°, internal rotation to back pocket.  Cross body adduction is uncomfortable for him and he has a positive active compression maneuver for acromioclavicular joint pain.  He does have a positive Phalen's over the carpal tunnel.  Good  and pinch strength.  Intact sensation.  Brisk capillary refill.    Imaging:  AP, scapular Y and axillary views of the left shoulder are ordered and reviewed.  These are compared to previous x-rays.  The components appear well fixed and well positioned.  He does have severe acromioclavicular arthritis.    Assessment:  #1.  Mildly symptomatic acromioclavicular arthritis 4 months status post left reverse total shoulder arthroplasty #2.  Left carpal tunnel syndrome    Plan:  We talked about a possible injection for the acromioclavicular joint.  He does not feel that his symptoms are bad enough to justify that.  We discussed the nature of his problem  and options for him in the future.  For now he will live with it.  I will see him back in 1 year.  We discussed antibiotic prophylaxis recommendations.    He does have a component of carpal tunnel syndrome.  I fitted him for a brace for use at night.  If the symptoms progress, I told him that I want to see him back.  Otherwise, he can follow-up as needed for this issue.    Ja Archer MD  11/02/2018

## 2018-11-13 ENCOUNTER — OFFICE VISIT (OUTPATIENT)
Dept: CARDIOLOGY | Facility: CLINIC | Age: 78
End: 2018-11-13

## 2018-11-13 VITALS
HEIGHT: 67 IN | SYSTOLIC BLOOD PRESSURE: 160 MMHG | WEIGHT: 184.2 LBS | DIASTOLIC BLOOD PRESSURE: 98 MMHG | BODY MASS INDEX: 28.91 KG/M2 | HEART RATE: 51 BPM

## 2018-11-13 DIAGNOSIS — I45.10 RBBB: ICD-10-CM

## 2018-11-13 DIAGNOSIS — E78.2 MIXED HYPERLIPIDEMIA: ICD-10-CM

## 2018-11-13 DIAGNOSIS — I10 ESSENTIAL HYPERTENSION: ICD-10-CM

## 2018-11-13 DIAGNOSIS — I25.810 CORONARY ARTERY DISEASE INVOLVING CORONARY BYPASS GRAFT OF NATIVE HEART WITHOUT ANGINA PECTORIS: Primary | ICD-10-CM

## 2018-11-13 PROCEDURE — 99214 OFFICE O/P EST MOD 30 MIN: CPT | Performed by: INTERNAL MEDICINE

## 2018-11-13 PROCEDURE — 93000 ELECTROCARDIOGRAM COMPLETE: CPT | Performed by: INTERNAL MEDICINE

## 2018-11-13 NOTE — PROGRESS NOTES
Date of Office Visit: 18  Encounter Provider: Elian Rizo MD  Place of Service: [facility] CARDIOLOGY  Patient Name: Mukesh Beard Jr.  :1940  Referral Provider:No ref. provider found      Chief Complaint   Patient presents with   • Follow-up     History of Present Illness  Mr. Beard is a pleasant 78-year-old gentleman with history of ischemic heart disease,  mild left ventricular systolic dysfunction, severe three-vessel disease, and left main  disease. He had coronary artery bypass grafting in May 2000 where he had left internal  mammary graft to left anterior descending, vein graft to the diagonal, vein graft to the  obtuse marginal, vein graft to the second obtuse marginal, and vein graft to the posterior  descending artery. Then, in , he had some vertigo with a negative carotid study and  unremarkable echo. He comes in for followup now.  The patient denies chest pain, pressure and heaviness. No shortness of breath, othopnea or PND. No palpitations, near syncope or syncope. No stroke type symptoms like paralysis, paresthesia, abrupt vision loss and dysarthria. No bleeding like blood in the stool or dark stools.  He does exercise to walking on the treadmill 30 minutes 7 days a week.  His blood pressure typically runs 140/60-70.  Overall he feels like he's doing okay.  Things at home are good.      Coronary Artery Disease   Pertinent negatives include no dizziness, muscle weakness or weight gain. There is no history of past myocardial infarction.         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 Resume 4/14/18     • cetirizine (zyrTEC) 10 MG tablet Take 10 mg by mouth Every Morning.     • Cholecalciferol (VITAMIN D3) 2000 units capsule Take 2,000 Units by mouth Every Night.     • Cinnamon 500 MG capsule Take 500 mg by mouth Every Morning. 2000mg qd     • doxycycline (VIBRAMYCIN) 100 MG capsule Take 100 mg by mouth Every Morning.     • Flaxseed, Linseed, (FLAX SEED OIL) 1000 MG capsule Take 1,200 mg by mouth Every Morning. Stopped 4/5/18     • loteprednol (ALREX) 0.2 % suspension Administer 1 drop to both eyes 2 (Two) Times a Day.     • LUMIGAN 0.01 % ophthalmic drops Administer 1 drop to both eyes Every Night.     • meloxicam (MOBIC) 15 MG tablet Take 15 mg by mouth Every Night. Stopped 4/5/18     • metoprolol succinate XL (TOPROL-XL) 25 MG 24 hr tablet Take 12.5 mg by mouth Every Night.     • montelukast (SINGULAIR) 10 MG tablet Take 10 mg by mouth Every Night.     • niacin 500 MG tablet Take 1,000 mg by mouth Every Night.     • Omega-3 Fatty Acids (FISH OIL) 1200 MG capsule capsule Take 1,200 mg by mouth Daily With Breakfast. Stopped 4/5/18     • raNITIdine (ZANTAC) 300 MG tablet Take 300 mg by mouth Every Night.     • rosuvastatin (CRESTOR) 20 MG tablet Take 20 mg by mouth Every Night.       No current facility-administered medications on file prior to visit.          Social History     Socioeconomic History   • Marital status:      Spouse name: Not on file   • Number of children: Not on file   • Years of education: Not on file   • Highest  education level: Not on file   Social Needs   • Financial resource strain: Not on file   • Food insecurity - worry: Not on file   • Food insecurity - inability: Not on file   • Transportation needs - medical: Not on file   • Transportation needs - non-medical: Not on file   Occupational History   • Not on file   Tobacco Use   • Smoking status: Former Smoker     Types: Cigarettes     Last attempt to quit: 1972     Years since quittin.8   • Smokeless tobacco: Never Used   Substance and Sexual Activity   • Alcohol use: Yes     Comment: RARE   • Drug use: Defer   • Sexual activity: Defer   Other Topics Concern   • Not on file   Social History Narrative   • Not on file       Family History   Problem Relation Age of Onset   • Heart disease Mother         valvular heart disease   • Malig Hyperthermia Neg Hx          Review of Systems   Constitution: Negative for decreased appetite, diaphoresis, fever, weakness, malaise/fatigue, weight gain and weight loss.   HENT: Negative for congestion, hearing loss, nosebleeds and tinnitus.    Eyes: Negative for blurred vision, double vision, vision loss in left eye, vision loss in right eye and visual disturbance.   Cardiovascular:        As noted in HPI   Respiratory:        As noted HPI   Endocrine: Negative for cold intolerance and heat intolerance.   Hematologic/Lymphatic: Negative for bleeding problem. Does not bruise/bleed easily.   Skin: Negative for color change, flushing, itching and rash.   Musculoskeletal: Positive for joint pain. Negative for arthritis, back pain, joint swelling, muscle weakness and myalgias.   Gastrointestinal: Negative for bloating, abdominal pain, constipation, diarrhea, dysphagia, heartburn, hematemesis, hematochezia, melena, nausea and vomiting.   Genitourinary: Negative for bladder incontinence, dysuria, frequency, nocturia and urgency.   Neurological: Negative for dizziness, focal weakness, headaches, light-headedness, loss of balance, numbness,  "paresthesias and vertigo.   Psychiatric/Behavioral: Negative for depression, memory loss and substance abuse.       Procedures      ECG 12 Lead  Date/Time: 11/13/2018 11:17 AM  Performed by: Elian Rizo MD  Authorized by: Elian Rizo MD   Comparison: compared with previous ECG   Similar to previous ECG  Rhythm: sinus rhythm  Rate: normal  Conduction: right bundle branch block  QRS axis: left                  Objective:    /98 (BP Location: Right arm)   Pulse 51   Ht 170.2 cm (67\")   Wt 83.6 kg (184 lb 3.2 oz)   BMI 28.85 kg/m²        Physical Exam  Physical Exam   Constitutional: He is oriented to person, place, and time. He appears well-developed and well-nourished. No distress.   HENT:   Head: Normocephalic.   Eyes: Conjunctivae are normal. Pupils are equal, round, and reactive to light. No scleral icterus.   Neck: Normal carotid pulses, no hepatojugular reflux and no JVD present. Carotid bruit is not present. No tracheal deviation, no edema and no erythema present. No thyromegaly present.   Cardiovascular: Normal rate, regular rhythm, S1 normal, S2 normal and intact distal pulses.  No extrasystoles are present. PMI is not displaced. Exam reveals no gallop, no distant heart sounds and no friction rub.   Murmur heard.   Systolic murmur is present with a grade of 2/6 at the upper right sternal border.  Pulses:       Carotid pulses are 2+ on the right side with bruit, and 2+ on the left side with bruit.       Radial pulses are 2+ on the right side, and 2+ on the left side.        Femoral pulses are 2+ on the right side, and 2+ on the left side.       Dorsalis pedis pulses are 2+ on the right side, and 2+ on the left side.        Posterior tibial pulses are 2+ on the right side, and 2+ on the left side.   Pulmonary/Chest: Effort normal and breath sounds normal. No respiratory distress. He has no decreased breath sounds. He has no wheezes. He has no rhonchi. He has no rales. He exhibits no " tenderness.   Abdominal: Soft. Bowel sounds are normal. He exhibits no distension and no mass. There is no hepatosplenomegaly. There is no tenderness. There is no rebound and no guarding.   Musculoskeletal: He exhibits no edema, tenderness or deformity.   Neurological: He is alert and oriented to person, place, and time.   Skin: Skin is warm and dry. No rash noted. He is not diaphoretic. No cyanosis or erythema. No pallor. Nails show no clubbing.   Psychiatric: He has a normal mood and affect. His speech is normal and behavior is normal. Judgment and thought content normal.           Assessment:    1. This is a 78-year-old gentleman with history of severe ischemic heart disease and mild left ventricular systolic dysfunction status post coronary artery bypass grafting in May  2000. Echocardiogram done August 2011 showed an ejection fraction of 54% with mild mitral insufficiency and normal RV systolic pressure. Perfusion stress test 11/13 was unremarkable.   Coronary Artery Disease  Assessment  • The patient has no angina    Plan  • Lifestyle modifications discussed include adhering to a heart healthy diet, avoidance of tobacco products, maintenance of a healthy weight, medication compliance, regular exercise and regular monitoring of cholesterol and blood pressure    Subjective - Objective  • There is a history of previous coronary artery bypass graft  • Current antiplatelet therapy includes aspirin 81 mg    Stable to continue the same see us skin in follow-up in a year.     2. History of hyperlipidemia, on rosuvastatin. Last LDL was 34.  3. Bilateral carotid bruits.  Carotid ultrasound 2009, 2011 and then again in 2015 showed no stenosis.   4.  Hypertension blood pressure elevated here but at home it's been under good control at goal.  5.  New Right Bundle branch block.  Unclear etiology he is to return for an echocardiogram to rule out structural heart disease.         Plan:

## 2018-11-13 NOTE — PROGRESS NOTES
Date of Office Visit: 18  Encounter Provider: Elian Rizo MD  Place of Service: [facility] CARDIOLOGY  Patient Name: Mukesh Beard Jr.  :1940  Referral Provider:No ref. 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 Resume 18     • cetirizine (zyrTEC) 10 MG tablet Take 10 mg by mouth Every Morning.     • Cholecalciferol (VITAMIN D3) 2000 units capsule Take 2,000 Units by mouth Every Night.     • Cinnamon 500 MG capsule Take 500 mg by mouth Every Morning. 2000mg qd     • doxycycline (VIBRAMYCIN) 100 MG capsule Take 100 mg by mouth Every Morning.     • Flaxseed, Linseed, (FLAX SEED OIL) 1000 MG capsule Take 1,200 mg by mouth Every Morning. Stopped 18     • loteprednol (ALREX) 0.2 % suspension Administer 1 drop to both eyes 2 (Two) Times a Day.     • LUMIGAN 0.01 % ophthalmic drops Administer 1 drop to both eyes Every Night.     • meloxicam (MOBIC) 15 MG tablet Take 15 mg by mouth Every Night. Stopped 18     •  "metoprolol succinate XL (TOPROL-XL) 25 MG 24 hr tablet Take 12.5 mg by mouth Every Night.     • montelukast (SINGULAIR) 10 MG tablet Take 10 mg by mouth Every Night.     • niacin 500 MG tablet Take 1,000 mg by mouth Every Night.     • Omega-3 Fatty Acids (FISH OIL) 1200 MG capsule capsule Take 1,200 mg by mouth Daily With Breakfast. Stopped 18     • raNITIdine (ZANTAC) 300 MG tablet Take 300 mg by mouth Every Night.     • rosuvastatin (CRESTOR) 20 MG tablet Take 20 mg by mouth Every Night.       No current facility-administered medications on file prior to visit.          Social History     Socioeconomic History   • Marital status:      Spouse name: Not on file   • Number of children: Not on file   • Years of education: Not on file   • Highest education level: Not on file   Social Needs   • Financial resource strain: Not on file   • Food insecurity - worry: Not on file   • Food insecurity - inability: Not on file   • Transportation needs - medical: Not on file   • Transportation needs - non-medical: Not on file   Occupational History   • Not on file   Tobacco Use   • Smoking status: Former Smoker     Types: Cigarettes     Last attempt to quit: 1972     Years since quittin.8   • Smokeless tobacco: Never Used   Substance and Sexual Activity   • Alcohol use: Yes     Comment: RARE   • Drug use: Defer   • Sexual activity: Defer   Other Topics Concern   • Not on file   Social History Narrative   • Not on file       Family History   Problem Relation Age of Onset   • Heart disease Mother         valvular heart disease   • Malig Hyperthermia Neg Hx          ROS    Procedures    Procedures        Objective:    /98 (BP Location: Right arm)   Pulse 51   Ht 170.2 cm (67\")   Wt 83.6 kg (184 lb 3.2 oz)   BMI 28.85 kg/m²        Physical Exam  Physical Exam        Assessment:             Plan:         "

## 2018-11-28 ENCOUNTER — HOSPITAL ENCOUNTER (OUTPATIENT)
Dept: CARDIOLOGY | Facility: HOSPITAL | Age: 78
Discharge: HOME | End: 2018-11-28
Attending: INTERNAL MEDICINE | Admitting: INTERNAL MEDICINE

## 2018-11-28 ENCOUNTER — TELEPHONE (OUTPATIENT)
Dept: CARDIOLOGY | Facility: CLINIC | Age: 78
End: 2018-11-28

## 2018-11-28 VITALS
HEART RATE: 63 BPM | BODY MASS INDEX: 28.88 KG/M2 | DIASTOLIC BLOOD PRESSURE: 75 MMHG | WEIGHT: 184 LBS | SYSTOLIC BLOOD PRESSURE: 216 MMHG | HEIGHT: 67 IN

## 2018-11-28 LAB
ASCENDING AORTA: 3.5 CM
BH CV ECHO MEAS - ACS: 2.2 CM
BH CV ECHO MEAS - AI MAX PG: 99.8 MMHG
BH CV ECHO MEAS - AI MAX VEL: 499.5 CM/SEC
BH CV ECHO MEAS - AO MAX PG (FULL): 4.7 MMHG
BH CV ECHO MEAS - AO MAX PG: 9.6 MMHG
BH CV ECHO MEAS - AO MEAN PG (FULL): 2.2 MMHG
BH CV ECHO MEAS - AO MEAN PG: 5 MMHG
BH CV ECHO MEAS - AO ROOT AREA (BSA CORRECTED): 2
BH CV ECHO MEAS - AO ROOT AREA: 11.5 CM^2
BH CV ECHO MEAS - AO ROOT DIAM: 3.8 CM
BH CV ECHO MEAS - AO V2 MAX: 154.7 CM/SEC
BH CV ECHO MEAS - AO V2 MEAN: 106.2 CM/SEC
BH CV ECHO MEAS - AO V2 VTI: 38.1 CM
BH CV ECHO MEAS - AVA(I,A): 2.3 CM^2
BH CV ECHO MEAS - AVA(I,D): 2.3 CM^2
BH CV ECHO MEAS - AVA(V,A): 2.2 CM^2
BH CV ECHO MEAS - AVA(V,D): 2.2 CM^2
BH CV ECHO MEAS - BSA(HAYCOCK): 2 M^2
BH CV ECHO MEAS - BSA: 2 M^2
BH CV ECHO MEAS - BZI_BMI: 28.8 KILOGRAMS/M^2
BH CV ECHO MEAS - BZI_METRIC_HEIGHT: 170.2 CM
BH CV ECHO MEAS - BZI_METRIC_WEIGHT: 83.5 KG
BH CV ECHO MEAS - EDV(MOD-SP2): 129 ML
BH CV ECHO MEAS - EDV(MOD-SP4): 126 ML
BH CV ECHO MEAS - EDV(TEICH): 156.6 ML
BH CV ECHO MEAS - EF(CUBED): 65.6 %
BH CV ECHO MEAS - EF(MOD-BP): 67 %
BH CV ECHO MEAS - EF(MOD-SP2): 68.2 %
BH CV ECHO MEAS - EF(MOD-SP4): 65.1 %
BH CV ECHO MEAS - EF(TEICH): 56.5 %
BH CV ECHO MEAS - ESV(MOD-SP2): 41 ML
BH CV ECHO MEAS - ESV(MOD-SP4): 44 ML
BH CV ECHO MEAS - ESV(TEICH): 68.2 ML
BH CV ECHO MEAS - FS: 29.9 %
BH CV ECHO MEAS - IVS/LVPW: 1
BH CV ECHO MEAS - IVSD: 1.2 CM
BH CV ECHO MEAS - LAT PEAK E' VEL: 10 CM/SEC
BH CV ECHO MEAS - LV DIASTOLIC VOL/BSA (35-75): 64.6 ML/M^2
BH CV ECHO MEAS - LV MASS(C)D: 269 GRAMS
BH CV ECHO MEAS - LV MASS(C)DI: 137.9 GRAMS/M^2
BH CV ECHO MEAS - LV MAX PG: 4.9 MMHG
BH CV ECHO MEAS - LV MEAN PG: 2.8 MMHG
BH CV ECHO MEAS - LV SYSTOLIC VOL/BSA (12-30): 22.5 ML/M^2
BH CV ECHO MEAS - LV V1 MAX: 110.6 CM/SEC
BH CV ECHO MEAS - LV V1 MEAN: 80 CM/SEC
BH CV ECHO MEAS - LV V1 VTI: 28.2 CM
BH CV ECHO MEAS - LVIDD: 5.6 CM
BH CV ECHO MEAS - LVIDS: 4 CM
BH CV ECHO MEAS - LVLD AP2: 8.1 CM
BH CV ECHO MEAS - LVLD AP4: 8.4 CM
BH CV ECHO MEAS - LVLS AP2: 6.7 CM
BH CV ECHO MEAS - LVLS AP4: 7.1 CM
BH CV ECHO MEAS - LVOT AREA (M): 3.1 CM^2
BH CV ECHO MEAS - LVOT AREA: 3.1 CM^2
BH CV ECHO MEAS - LVOT DIAM: 2 CM
BH CV ECHO MEAS - LVPWD: 1.2 CM
BH CV ECHO MEAS - MED PEAK E' VEL: 8 CM/SEC
BH CV ECHO MEAS - MR MAX PG: 82.9 MMHG
BH CV ECHO MEAS - MR MAX VEL: 455.3 CM/SEC
BH CV ECHO MEAS - MV A DUR: 0.11 SEC
BH CV ECHO MEAS - MV A MAX VEL: 73.2 CM/SEC
BH CV ECHO MEAS - MV DEC SLOPE: 336.5 CM/SEC^2
BH CV ECHO MEAS - MV DEC TIME: 0.25 SEC
BH CV ECHO MEAS - MV E MAX VEL: 81 CM/SEC
BH CV ECHO MEAS - MV E/A: 1.1
BH CV ECHO MEAS - MV MAX PG: 2.7 MMHG
BH CV ECHO MEAS - MV MEAN PG: 1 MMHG
BH CV ECHO MEAS - MV P1/2T MAX VEL: 82 CM/SEC
BH CV ECHO MEAS - MV P1/2T: 71.3 MSEC
BH CV ECHO MEAS - MV V2 MAX: 81.7 CM/SEC
BH CV ECHO MEAS - MV V2 MEAN: 47.9 CM/SEC
BH CV ECHO MEAS - MV V2 VTI: 28.8 CM
BH CV ECHO MEAS - MVA P1/2T LCG: 2.7 CM^2
BH CV ECHO MEAS - MVA(P1/2T): 3.1 CM^2
BH CV ECHO MEAS - MVA(VTI): 3 CM^2
BH CV ECHO MEAS - PA ACC TIME: 0.15 SEC
BH CV ECHO MEAS - PA MAX PG (FULL): 14.7 MMHG
BH CV ECHO MEAS - PA MAX PG: 15.9 MMHG
BH CV ECHO MEAS - PA PR(ACCEL): 10.9 MMHG
BH CV ECHO MEAS - PA V2 MAX: 199.5 CM/SEC
BH CV ECHO MEAS - PULM A REVS DUR: 0.14 SEC
BH CV ECHO MEAS - PULM A REVS VEL: 28 CM/SEC
BH CV ECHO MEAS - PULM DIAS VEL: 61.2 CM/SEC
BH CV ECHO MEAS - PULM S/D: 0.71
BH CV ECHO MEAS - PULM SYS VEL: 43.5 CM/SEC
BH CV ECHO MEAS - PVA(V,A): 0.97 CM^2
BH CV ECHO MEAS - PVA(V,D): 0.97 CM^2
BH CV ECHO MEAS - QP/QS: 0.57
BH CV ECHO MEAS - RAP SYSTOLE: 3 MMHG
BH CV ECHO MEAS - RV MAX PG: 1.2 MMHG
BH CV ECHO MEAS - RV MEAN PG: 0.81 MMHG
BH CV ECHO MEAS - RV V1 MAX: 55.8 CM/SEC
BH CV ECHO MEAS - RV V1 MEAN: 43.7 CM/SEC
BH CV ECHO MEAS - RV V1 VTI: 14.5 CM
BH CV ECHO MEAS - RVOT AREA: 3.5 CM^2
BH CV ECHO MEAS - RVOT DIAM: 2.1 CM
BH CV ECHO MEAS - RVSP: 39 MMHG
BH CV ECHO MEAS - SI(AO): 224.3 ML/M^2
BH CV ECHO MEAS - SI(CUBED): 60.5 ML/M^2
BH CV ECHO MEAS - SI(LVOT): 45 ML/M^2
BH CV ECHO MEAS - SI(MOD-SP2): 45.1 ML/M^2
BH CV ECHO MEAS - SI(MOD-SP4): 42 ML/M^2
BH CV ECHO MEAS - SI(TEICH): 45.3 ML/M^2
BH CV ECHO MEAS - SUP REN AO DIAM: 1.7 CM
BH CV ECHO MEAS - SV(AO): 437.8 ML
BH CV ECHO MEAS - SV(CUBED): 118.1 ML
BH CV ECHO MEAS - SV(LVOT): 87.9 ML
BH CV ECHO MEAS - SV(MOD-SP2): 88 ML
BH CV ECHO MEAS - SV(MOD-SP4): 82 ML
BH CV ECHO MEAS - SV(RVOT): 50.4 ML
BH CV ECHO MEAS - SV(TEICH): 88.4 ML
BH CV ECHO MEAS - TAPSE (>1.6): 1.9 CM2
BH CV ECHO MEAS - TR MAX VEL: 300 CM/SEC
BH CV ECHO MEASUREMENTS AVERAGE E/E' RATIO: 9
BH CV XLRA - RV BASE: 3.3 CM
BH CV XLRA - TDI S': 8 CM/SEC
LEFT ATRIUM VOLUME INDEX: 44 ML/M2
SINUS: 3.4 CM
STJ: 2.9 CM

## 2018-11-28 PROCEDURE — 93306 TTE W/DOPPLER COMPLETE: CPT | Performed by: INTERNAL MEDICINE

## 2018-11-28 PROCEDURE — 93306 TTE W/DOPPLER COMPLETE: CPT

## 2018-11-28 NOTE — TELEPHONE ENCOUNTER
Mukesh Beard Jr.  Male, 78 y.o., 1940  PCP:   Alexandra Barnes MD  Language:   English  Need Interp:   No  Last Weight:   83.5 kg (184 lb)  Phone:   H: [phone] M: [phone]  Allergies  No Known Allergies  Health Maintenance:   Due  FYI:   None  Primary Ins.:   [employer] MEDICARE REPLACEMENT  MRN:   [id number]  MyChart:   Active  Pharmacy:   [address] [place] [facility] [phone] [facility] [phone] FX [[id number]  Preferred Lab:   None  Next Appt with Me:   11/18/2019  Next Appt Date by Dept:   11/08/2019        PACS Images      Radiology Images   Adult Transthoracic Echo Complete W/ Cont if Necessary Per Protocol   Order: [id number]   Status:  Final result   Visible to patient:  No (Not Released) Dx:  Coronary artery disease involving cor...   Details     Reading Physician Reading Date Result Priority   Elian Rizo MD 11/28/2018 Routine      Result Text   ·   Left ventricular systolic function is normal. Calculated EF = 67%. Estimated EF was in agreement with the calculated EF. Normal left ventricular cavity size noted. All left ventricular wall segments contract normally.  · Left ventricular wall thickness is consistent with mild concentric hypertrophy.  · Left ventricular diastolic function is normal.  · Left atrial volume is mildly increased.  · The aortic valve is abnormal in structure. The valve exhibits sclerosis.  · Mild aortic valve regurgitation is present.          Ref Range & Units    BSA m^2 2.0    IVSd cm 1.2    LVIDd cm 5.6    LVIDs cm 4.0    LVPWd cm 1.2    IVS/LVPW  1.0    FS % 29.9    EDV(Teich) ml 156.6    ESV(Teich) ml 68.2    EF(Teich) % 56.5    EF(cubed) % 65.6    LV mass(C)d grams 269.0    LV mass(C)dI grams/m^2 137.9    SV(Teich) ml 88.4    SI(Teich) ml/m^2 45.3    SV(cubed) ml 118.1    SI(cubed) ml/m^2 60.5    Ao root diam cm 3.8    Ao root area cm^2 11.5    ACS cm 2.2    LVOT diam cm 2.0    LVOT area cm^2 3.1    LVOT area(traced)  cm^2 3.1    RVOT diam cm 2.1    RVOT area cm^2 3.5    LVLd ap4 cm 8.4    EDV(MOD-sp4) ml 126.0    LVLs ap4 cm 7.1    ESV(MOD-sp4) ml 44.0    EF(MOD-sp4) % 65.1    LVLd ap2 cm 8.1    EDV(MOD-sp2) ml 129.0    LVLs ap2 cm 6.7    ESV(MOD-sp2) ml 41.0    EF(MOD-sp2) % 68.2    SV(MOD-sp4) ml 82.0    SI(MOD-sp4) ml/m^2 42.0    SV(MOD-sp2) ml 88.0    SI(MOD-sp2) ml/m^2 45.1    Ao root area (BSA corrected)  2.0    LV Cantrell Vol (BSA corrected) ml/m^2 64.6    LV Sys Vol (BSA corrected) ml/m^2 22.5    MV A dur sec 0.11    MV E max terrence cm/sec 81.0    MV A max terrence cm/sec 73.2    MV E/A  1.1    MV V2 max cm/sec 81.7    MV max PG mmHg 2.7    MV V2 mean cm/sec 47.9    MV mean PG mmHg 1.0    MV V2 VTI cm 28.8    MVA(VTI) cm^2 3.0    MV P1/2t max terrence cm/sec 82.0    MV P1/2t msec 71.3    MVA(P1/2t) cm^2 3.1    MV dec slope cm/sec^2 336.5    MV dec time sec 0.25    Ao pk terrence cm/sec 154.7    Ao max PG mmHg 9.6    Ao max PG (full) mmHg 4.7    Ao V2 mean cm/sec 106.2    Ao mean PG mmHg 5.0    Ao mean PG (full) mmHg 2.2    Ao V2 VTI cm 38.1    RODNEY(I,A) cm^2 2.3    RODNEY(I,D) cm^2 2.3    RODNEY(V,A) cm^2 2.2    RODNEY(V,D) cm^2 2.2    AI max terrence cm/sec 499.5    AI max PG mmHg 99.8    LV V1 max PG mmHg 4.9    LV V1 mean PG mmHg 2.8    LV V1 max cm/sec 110.6    LV V1 mean cm/sec 80.0    LV V1 VTI cm 28.2    MR max terrence cm/sec 455.3    MR max PG mmHg 82.9    SV(Ao) ml 437.8    SI(Ao) ml/m^2 224.3    SV(LVOT) ml 87.9    SV(RVOT) ml 50.4    SI(LVOT) ml/m^2 45.0    PA V2 max cm/sec 199.5    PA max PG mmHg 15.9    PA max PG (full) mmHg 14.7    BH CV ECHO AGUILAR - PVA(V,A) cm^2 0.97    BH CV ECHO AGUILAR - PVA(V,D) cm^2 0.97    PA acc time sec 0.15    RV V1 max PG mmHg 1.2    RV V1 mean PG mmHg 0.81    RV V1 max cm/sec 55.8    RV V1 mean cm/sec 43.7    RV V1 VTI cm 14.5    TR max terrence cm/sec 300.0    PA pr(Accel) mmHg 10.9    Pulm Sys Terrence cm/sec 43.5    Pulm Cantrell Terrence cm/sec 61.2    Pulm S/D  0.71    Qp/Qs  0.57    Pulm A Revs Dur sec 0.14    Pulm A Revs Terrence cm/sec  28.0    MVA P1/2T LCG cm^2 2.7     CV ECHO AGUILAR - BZI_BMI kilograms/m^2 28.8     CV ECHO AGUILAR - BSA(HAYCOCK) m^2 2.0     CV ECHO AGUILAR - BZI_METRIC_WEIGHT kg 83.5     CV ECHO AGUILAR - BZI_METRIC_HEIGHT cm 170.2    TDI S' cm/sec 8.00    RV Base cm 3.30    Sinus cm 3.40    STJ cm 2.90    Ascending aorta cm 3.50    LA Volume Index mL/m2 44.0    Avg E/e' ratio  9.00    EF(MOD-bp) % 67    Lat Peak E' Terrence cm/sec 10.0    Med Peak E' Terrence cm/sec 8.00    RAP systole mmHg 3    RVSP(TR) mmHg 39    Abdo Ao Diam cm 1.70    TAPSE (>1.6) cm2 1.90    Resulting Agency  [facility] OUTPATIENT ECHOCARDIOGRAPHY  [address]  [address]  [place] [address] [phone]      Study Description     A two-dimensional transthoracic echocardiogram with color flow and Doppler was performed. The study is technically good for diagnosis.   Echocardiogram Findings     Left Ventricle Left ventricular systolic function is normal. Calculated EF = 67%. Estimated EF was in agreement with the calculated EF. Normal left ventricular cavity size noted. All left ventricular wall segments contract normally. Left ventricular wall thickness is consistent with mild concentric hypertrophy. Left ventricular diastolic function is normal.   Right Ventricle Normal right ventricular cavity size, wall thickness, systolic function and septal motion noted.   Left Atrium Left atrial volume is mildly increased.   Right Atrium Normal right atrial size noted.   Aortic Valve The aortic valve is abnormal in structure. The valve exhibits sclerosis. Mild aortic valve regurgitation is present. No aortic valve stenosis is present.   Mitral Valve The mitral valve is normal in structure. No mitral valve regurgitation is present. No significant mitral valve stenosis is present.   Tricuspid Valve The tricuspid valve is normal. No tricuspid valve stenosis is present. No tricuspid valve regurgitation is present. Estimated right ventricular  systolic pressure from tricuspid regurgitation is mildly elevated (35-45 mmHg). Calculated right ventricular systolic pressure from tricuspid regurgitation is 39 mmHg.   Pulmonic Valve The pulmonic valve is structurally normal. There is no significant pulmonic valve stenosis present. There is no pulmonic valve regurgitation present.   Greater Vessels No dilation of the aortic root is present. No dilation of the sinuses of Valsalva is present. No dilation of the proximal aorta is present. No dilation of the ascending aorta is present. No dilation of the aortic arch is present. No dilation of the descending aorta present. The inferior vena cava is normally sized. Normal IVC inspiratory collapse of greater than 50% noted.   Pericardium The pericardium is normal. There is no evidence of pericardial effusion.   Wall Scoring     Score Index: 1.00            The left ventricular wall motion is normal.               PACS Images      Show images for Adult Transthoracic Echo Complete W/ Cont if Necessary Per Protocol         Specimen Collected: 11/28/18 08:16 Last Resulted: 11/28/18 09:54        Order Details       View Encounter       Lab and Collection Details       Routing       Result History            Status of Other Orders     Completed     SCANNED EKG  11/13/18   ECG 12 Lead  11/13/18          Encounter Notes      All notes         Routing History     Priority Sent On From To Message Type    11/28/2018  9:57 AM Elian Rizo MD Imburgia, Michael, MD Results

## 2018-12-21 ENCOUNTER — OFFICE VISIT CONVERTED (OUTPATIENT)
Dept: FAMILY MEDICINE CLINIC | Age: 78
End: 2018-12-21
Attending: FAMILY MEDICINE

## 2019-06-18 ENCOUNTER — OFFICE VISIT CONVERTED (OUTPATIENT)
Dept: FAMILY MEDICINE CLINIC | Age: 79
End: 2019-06-18
Attending: FAMILY MEDICINE

## 2019-11-08 ENCOUNTER — OFFICE VISIT (OUTPATIENT)
Dept: ORTHOPEDIC SURGERY | Facility: CLINIC | Age: 79
End: 2019-11-08

## 2019-11-08 VITALS — WEIGHT: 184 LBS | HEIGHT: 67 IN | TEMPERATURE: 98.6 F | BODY MASS INDEX: 28.88 KG/M2

## 2019-11-08 DIAGNOSIS — Z96.612 HX OF TOTAL SHOULDER REPLACEMENT, LEFT: Primary | ICD-10-CM

## 2019-11-08 PROCEDURE — 73030 X-RAY EXAM OF SHOULDER: CPT | Performed by: ORTHOPAEDIC SURGERY

## 2019-11-08 PROCEDURE — 99212 OFFICE O/P EST SF 10 MIN: CPT | Performed by: ORTHOPAEDIC SURGERY

## 2019-11-08 RX ORDER — CARBAMAZEPINE 100 MG/1
100 TABLET, EXTENDED RELEASE ORAL 2 TIMES DAILY
COMMUNITY
Start: 2019-10-25 | End: 2021-09-28

## 2019-11-10 NOTE — PROGRESS NOTES
"Mukesh Beard Jr. : 1940 MRN: [id number] DATE: 11/10/2019      DIAGNOSIS: Annual follow up of his left reverse total shoulder arthroplasty      SUBJECTIVE: Patient returns today now roughly a year and a half out from his left reverse total shoulder replacement. Patient reports doing well with no unusual complaints. Denies any limitations due to the shoulder.    OBJECTIVE:    Temp 98.6 °F (37 °C)   Ht 170.2 cm (67\")   Wt 83.5 kg (184 lb)   BMI 28.82 kg/m²   Family History   Problem Relation Age of Onset   • Heart disease Mother         valvular heart disease   • Malig Hyperthermia Neg Hx      Past Medical History:   Diagnosis Date   • Anesthesia complication     son woke up during surgery  knee replacement   • Ankylosing spondylitis of site in spine (CMS/[facility])    • Arthritis    • ASCVD (arteriosclerotic cardiovascular disease)     mild lv dysfunction   • Coronary artery disease May 2000   • Diabetes mellitus (CMS/[facility])     high b/s diet controlled   • GERD (gastroesophageal reflux disease)    • Hyperlipidemia    • Hypertension    • 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)    • Myocardial infarction (CMS/[facility])    • Osteoarthritis    • Ringing in ear, bilateral    • Vertigo 2011     Past Surgical History:   Procedure Laterality Date   • APPENDECTOMY     • CATARACT EXTRACTION     • CHOLECYSTECTOMY     • CORONARY ARTERY BYPASS GRAFT  2000   • EPIDURAL BLOCK     • EYE SURGERY      rt tear duct sx   • HAND SURGERY Bilateral    • JOINT REPLACEMENT     • SHOULDER SURGERY     • TOTAL SHOULDER ARTHROPLASTY W/ DISTAL CLAVICLE EXCISION Left 2018    Procedure: Reverse Total Shoulder Arthroplsty;  Surgeon: Ja Archer MD;  Location: [facility];  Service: Orthopedics     Social History     Socioeconomic History   • Marital status:      Spouse name: Not on file   • Number of children: Not on file   • Years of education: Not on file   • Highest education level: Not on file   Tobacco " Use   • Smoking status: Former Smoker     Types: Cigarettes     Last attempt to quit: 1972     Years since quittin.8   • Smokeless tobacco: Never Used   Substance and Sexual Activity   • Alcohol use: Yes     Comment: RARE   • Drug use: No   • Sexual activity: Not Currently     Partners: Female   Lifestyle   • Physical activity:     Days per week: 7 days     Minutes per session: 30 min   • Stress: Not on file     Review of Systems - Negative except as above    Exam:. The incision is well healed. No effusion.  Range of motion is measured at forward elevation roughly 160 degrees, abduction about 100 degrees, external rotation roughly 50 degrees, internal rotation to his back pocket. The arm is soft and nontender.  Good strength with elevation and abduction. Intact to light touch with palpable distal pulses.     DIAGNOSTIC STUDIES  Xrays: 3 views of the left shoulder (AP, scapular Y and axillary) were ordered and reviewed for evaluation of shoulder replacement. They demonstrate a well positioned, well aligned replacement without complicating factors noted. In comparison with previous films there has been no change.    ASSESSMENT: Annual follow up of left reverse total shoulder replacement.    PLAN: He seems to be doing well.  Antibiotic prophylaxis recommendations were discussed.    Follow up in 1 year

## 2019-11-18 ENCOUNTER — OFFICE VISIT (OUTPATIENT)
Dept: CARDIOLOGY | Facility: CLINIC | Age: 79
End: 2019-11-18

## 2019-11-18 VITALS
HEIGHT: 67 IN | DIASTOLIC BLOOD PRESSURE: 90 MMHG | BODY MASS INDEX: 28.09 KG/M2 | WEIGHT: 179 LBS | HEART RATE: 47 BPM | SYSTOLIC BLOOD PRESSURE: 150 MMHG

## 2019-11-18 DIAGNOSIS — E78.2 MIXED HYPERLIPIDEMIA: ICD-10-CM

## 2019-11-18 DIAGNOSIS — I45.10 RBBB: ICD-10-CM

## 2019-11-18 DIAGNOSIS — I10 ESSENTIAL HYPERTENSION: ICD-10-CM

## 2019-11-18 DIAGNOSIS — I25.810 CORONARY ARTERY DISEASE INVOLVING CORONARY BYPASS GRAFT OF NATIVE HEART WITHOUT ANGINA PECTORIS: Primary | ICD-10-CM

## 2019-11-18 DIAGNOSIS — I65.22 CAROTID STENOSIS, ASYMPTOMATIC, LEFT: ICD-10-CM

## 2019-11-18 PROCEDURE — 93000 ELECTROCARDIOGRAM COMPLETE: CPT | Performed by: INTERNAL MEDICINE

## 2019-11-18 PROCEDURE — 99214 OFFICE O/P EST MOD 30 MIN: CPT | Performed by: INTERNAL MEDICINE

## 2019-11-18 NOTE — PROGRESS NOTES
Date of Office Visit: 19  Encounter Provider: Elian Rizo MD  Place of Service: [facility] CARDIOLOGY  Patient Name: Mukesh Beard Jr.  :1940  Referral Provider:Alexandra Barnes*      Chief Complaint   Patient presents with   • Coronary Artery Disease     History of Present Illness  Mr. Beard is a pleasant 79-year-old gentleman with history of ischemic heart disease,  mild left ventricular systolic dysfunction, severe three-vessel disease, and left main  disease. He had coronary artery bypass grafting in May 2000 where he had left internal  mammary graft to left anterior descending, vein graft to the diagonal, vein graft to the  obtuse marginal, vein graft to the second obtuse marginal, and vein graft to the posterior  descending artery. Then, in , he had some vertigo with a negative carotid study and  unremarkable echo. He comes in for followup now.  The patient denies chest pain, pressure and heaviness. No shortness of breath, othopnea or PND. No palpitations, near syncope or syncope. No stroke type symptoms like paralysis, paresthesia, abrupt vision loss and dysarthria. No bleeding like blood in the stool or dark stools.  The only problem he is really having is with his eyes.  With the pressure going up and that may suggest some drops and they may need to do more      Coronary Artery Disease   Pertinent negatives include no dizziness, muscle weakness or weight gain. There is no history of past myocardial infarction.         Past Medical History:   Diagnosis Date   • Anesthesia complication     son woke up during surgery  knee replacement   • Ankylosing spondylitis of site in spine (CMS/[facility])    • Arthritis    • ASCVD (arteriosclerotic cardiovascular disease)     mild lv dysfunction   • Coronary artery disease May 2000   • Diabetes mellitus (CMS/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)    • Myocardial infarction (CMS/[facility])    • Osteoarthritis    • Ringing in ear, bilateral    • Vertigo 04/2011         Past Surgical History:   Procedure Laterality Date   • APPENDECTOMY     • CATARACT EXTRACTION     • CHOLECYSTECTOMY     • CORONARY ARTERY BYPASS GRAFT  04/2000   • EPIDURAL BLOCK     • EYE SURGERY      rt tear duct sx   • HAND SURGERY Bilateral    • JOINT REPLACEMENT     • SHOULDER SURGERY     • TOTAL SHOULDER ARTHROPLASTY W/ DISTAL CLAVICLE EXCISION Left 4/12/2018    Procedure: Reverse Total Shoulder Arthroplsty;  Surgeon: Ja Archer MD;  Location: [facility] OR;  Service: Orthopedics         Current Outpatient Medications on File Prior to Visit   Medication Sig Dispense Refill   • aspirin 81 MG tablet Take 1 tablet by mouth Every Night. Resume 4/14/18     • carBAMazepine XR (TEGretol  XR) 100 MG 12 hr tablet Take 100 mg by mouth 2 (Two) Times a Day.     • Cholecalciferol (VITAMIN D3) 2000 units capsule Take 2,000 Units by mouth Every Night.     • Cinnamon 500 MG capsule Take 500 mg by mouth Every Morning. 2000mg qd     • doxycycline (VIBRAMYCIN) 100 MG capsule Take 100 mg by mouth Every Morning.     • Flaxseed, Linseed, (FLAX SEED OIL) 1000 MG capsule Take 1,200 mg by mouth Every Morning.     • loteprednol (ALREX) 0.2 % suspension Administer 1 drop to both eyes 2 (Two) Times a Day.     • meloxicam (MOBIC) 15 MG tablet Take 15 mg by mouth Every Night.     • metoprolol succinate XL (TOPROL-XL) 25 MG 24 hr tablet Take 12.5 mg by mouth Every Night.     • niacin 500 MG tablet Take 1,000 mg by mouth Every Night.     • Omega-3 Fatty Acids (FISH OIL) 1200 MG capsule capsule Take 1,200 mg by mouth Daily With Breakfast.     • raNITIdine (ZANTAC) 300 MG tablet Take 300 mg by mouth Every Night.     • rosuvastatin (CRESTOR) 20 MG tablet Take 20 mg by mouth Every Night.     • [DISCONTINUED] LUMIGAN 0.01 % ophthalmic drops Administer 1  drop to both eyes Every Night.     • [DISCONTINUED] montelukast (SINGULAIR) 10 MG tablet Take 10 mg by mouth Every Night.       No current facility-administered medications on file prior to visit.          Social History     Socioeconomic History   • Marital status:      Spouse name: Not on file   • Number of children: Not on file   • Years of education: Not on file   • Highest education level: Not on file   Tobacco Use   • Smoking status: Former Smoker     Types: Cigarettes     Last attempt to quit: 1972     Years since quittin.9   • Smokeless tobacco: Never Used   Substance and Sexual Activity   • Alcohol use: Yes     Comment: RARE   • Drug use: No   • Sexual activity: Not Currently     Partners: Female   Lifestyle   • Physical activity:     Days per week: 7 days     Minutes per session: 30 min   • Stress: Not on file       Family History   Problem Relation Age of Onset   • Heart disease Mother         valvular heart disease   • Malig Hyperthermia Neg Hx          Review of Systems   Constitution: Negative for decreased appetite, diaphoresis, fever, weakness, malaise/fatigue, weight gain and weight loss.   HENT: Negative for congestion, hearing loss, nosebleeds and tinnitus.    Eyes: Negative for blurred vision, double vision, vision loss in left eye, vision loss in right eye and visual disturbance.   Cardiovascular:        As noted in HPI   Respiratory:        As noted HPI   Endocrine: Negative for cold intolerance and heat intolerance.   Hematologic/Lymphatic: Negative for bleeding problem. Does not bruise/bleed easily.   Skin: Negative for color change, flushing, itching and rash.   Musculoskeletal: Negative for arthritis, back pain, joint pain, joint swelling, muscle weakness and myalgias.   Gastrointestinal: Negative for bloating, abdominal pain, constipation, diarrhea, dysphagia, heartburn, hematemesis, hematochezia, melena, nausea and vomiting.   Genitourinary: Negative for bladder incontinence,  "dysuria, frequency, nocturia and urgency.   Neurological: Negative for dizziness, focal weakness, headaches, light-headedness, loss of balance, numbness, paresthesias and vertigo.   Psychiatric/Behavioral: Negative for depression, memory loss and substance abuse.       Procedures      ECG 12 Lead  Date/Time: 11/18/2019 11:34 AM  Performed by: Elian Rizo MD  Authorized by: Elian Rizo MD   Comparison: compared with previous ECG   Similar to previous ECG  Rhythm: sinus rhythm  Rate: normal  Conduction: right bundle branch block  QRS axis: normal                  Objective:    /90 (BP Location: Left arm)   Pulse (!) 47   Ht 170.2 cm (67\")   Wt 81.2 kg (179 lb)   BMI 28.04 kg/m²        Physical Exam  Physical Exam   Constitutional: He is oriented to person, place, and time. He appears well-developed and well-nourished. No distress.   HENT:   Head: Normocephalic.   Eyes: Conjunctivae are normal. Pupils are equal, round, and reactive to light. No scleral icterus.   Neck: Normal carotid pulses, no hepatojugular reflux and no JVD present. Carotid bruit is not present. No tracheal deviation, no edema and no erythema present. No thyromegaly present.   Cardiovascular: Normal rate, regular rhythm, S1 normal, S2 normal, normal heart sounds and intact distal pulses.  No extrasystoles are present. PMI is not displaced. Exam reveals no gallop, no distant heart sounds and no friction rub.   No murmur heard.  Pulses:       Carotid pulses are 2+ on the right side, and 2+ on the left side.       Radial pulses are 2+ on the right side, and 2+ on the left side.        Femoral pulses are 2+ on the right side, and 2+ on the left side.       Dorsalis pedis pulses are 2+ on the right side, and 2+ on the left side.        Posterior tibial pulses are 2+ on the right side, and 2+ on the left side.   Pulmonary/Chest: Effort normal and breath sounds normal. No respiratory distress. He has no decreased breath sounds. He " has no wheezes. He has no rhonchi. He has no rales. He exhibits no tenderness.   Abdominal: Soft. Bowel sounds are normal. He exhibits no distension and no mass. There is no hepatosplenomegaly. There is no tenderness. There is no rebound and no guarding.   Musculoskeletal: He exhibits no edema, tenderness or deformity.   Neurological: He is alert and oriented to person, place, and time.   Skin: Skin is warm and dry. No rash noted. He is not diaphoretic. No cyanosis or erythema. No pallor. Nails show no clubbing.   Psychiatric: He has a normal mood and affect. His speech is normal and behavior is normal. Judgment and thought content normal.           Assessment:   1. This is a 79-year-old gentleman with history of severe ischemic heart disease and mild left ventricular systolic dysfunction status post coronary artery bypass grafting in May  2000. Echocardiogram done August 2011 showed an ejection fraction of 54% with mild mitral insufficiency and normal RV systolic pressure. Perfusion stress test 11/13 was unremarkable.   Coronary Artery Disease  Assessment  • The patient has no angina     Plan  • Lifestyle modifications discussed include adhering to a heart healthy diet, avoidance of tobacco products, maintenance of a healthy weight, medication compliance, regular exercise and regular monitoring of cholesterol and blood pressure     Subjective - Objective  • There is a history of previous coronary artery bypass graft  • Current antiplatelet therapy includes aspirin 81 mg   No complaints now. He is to continue the same and see us in follow up in one year.     2. History of hyperlipidemia, on rosuvastatin.   3. Bilateral carotid bruits.  Carotid ultrasound 2009, 2011 and then again in 2015 showed no stenosis.   Bruit still very loud we will recheck another carotid ultrasound.  4.  Hypertension blood pressure at goal.         Plan:

## 2019-11-22 ENCOUNTER — HOSPITAL ENCOUNTER (OUTPATIENT)
Dept: CARDIOLOGY | Facility: HOSPITAL | Age: 79
Discharge: HOME | End: 2019-11-22
Admitting: INTERNAL MEDICINE

## 2019-11-22 ENCOUNTER — TELEPHONE (OUTPATIENT)
Dept: CARDIOLOGY | Facility: CLINIC | Age: 79
End: 2019-11-22

## 2019-11-22 LAB
BH CV XLRA MEAS LEFT DIST CCA EDV: -17.7 CM/SEC
BH CV XLRA MEAS LEFT DIST CCA PSV: -70.7 CM/SEC
BH CV XLRA MEAS LEFT DIST ICA EDV: -22.8 CM/SEC
BH CV XLRA MEAS LEFT DIST ICA PSV: -63.9 CM/SEC
BH CV XLRA MEAS LEFT ICA/CCA RATIO: 0.9
BH CV XLRA MEAS LEFT MID CCA EDV: -36.8 CM/SEC
BH CV XLRA MEAS LEFT MID CCA PSV: -122 CM/SEC
BH CV XLRA MEAS LEFT MID ICA EDV: -18.9 CM/SEC
BH CV XLRA MEAS LEFT MID ICA PSV: -58.1 CM/SEC
BH CV XLRA MEAS LEFT PROX ECA PSV: -77 CM/SEC
BH CV XLRA MEAS LEFT PROX ICA EDV: -11.7 CM/SEC
BH CV XLRA MEAS LEFT PROX ICA PSV: -50.2 CM/SEC
BH CV XLRA MEAS LEFT PROX SCLA PSV: 197 CM/SEC
BH CV XLRA MEAS RIGHT DIST CCA EDV: 16.5 CM/SEC
BH CV XLRA MEAS RIGHT DIST CCA PSV: 97.4 CM/SEC
BH CV XLRA MEAS RIGHT DIST ICA EDV: -33 CM/SEC
BH CV XLRA MEAS RIGHT DIST ICA PSV: -96.6 CM/SEC
BH CV XLRA MEAS RIGHT ICA/CCA RATIO: 0.97
BH CV XLRA MEAS RIGHT MID CCA EDV: -14.1 CM/SEC
BH CV XLRA MEAS RIGHT MID CCA PSV: -91.1 CM/SEC
BH CV XLRA MEAS RIGHT MID ICA EDV: -32.2 CM/SEC
BH CV XLRA MEAS RIGHT MID ICA PSV: -101 CM/SEC
BH CV XLRA MEAS RIGHT PROX ECA PSV: -74.6 CM/SEC
BH CV XLRA MEAS RIGHT PROX ICA EDV: -23.6 CM/SEC
BH CV XLRA MEAS RIGHT PROX ICA PSV: -104 CM/SEC
BH CV XLRA MEAS RIGHT PROX SCLA PSV: 224 CM/SEC
BH CV XLRA MEAS RIGHT VERTEBRAL A PSV: -67.6 CM/SEC
LEFT ARM BP: NORMAL MMHG
RIGHT ARM BP: NORMAL MMHG

## 2019-11-22 PROCEDURE — 93880 EXTRACRANIAL BILAT STUDY: CPT | Performed by: INTERNAL MEDICINE

## 2019-11-22 PROCEDURE — 93880 EXTRACRANIAL BILAT STUDY: CPT

## 2019-11-22 NOTE — TELEPHONE ENCOUNTER
Reading Physician Reading Date Result Priority   Elian Rizo MD 11/22/2019 Routine      Result Text     · Right ICA Prox: Imaging indicates 1%-15% stenosis.  · Left ICA Prox: Imaging indicates 1-15% stenosis.  · Left Subclavian: Imaging indicates stenosis present.  · Left Vertebral: Retrograde flow present. Consider subclavian steal on the left.

## 2019-11-22 NOTE — TELEPHONE ENCOUNTER
Probable unchanged. Possible lsubclavian stenosis. L/M on machine and to call if problems or questions.MJI

## 2019-12-12 ENCOUNTER — OFFICE VISIT CONVERTED (OUTPATIENT)
Dept: FAMILY MEDICINE CLINIC | Age: 79
End: 2019-12-12
Attending: FAMILY MEDICINE

## 2019-12-30 ENCOUNTER — HOSPITAL ENCOUNTER (OUTPATIENT)
Dept: OTHER | Facility: HOSPITAL | Age: 79
Discharge: HOME | End: 2019-12-30
Attending: FAMILY MEDICINE

## 2019-12-30 ENCOUNTER — OFFICE VISIT CONVERTED (OUTPATIENT)
Dept: FAMILY MEDICINE CLINIC | Age: 79
End: 2019-12-30
Attending: FAMILY MEDICINE

## 2019-12-30 LAB
ALBUMIN SERPL-MCNC: 4.4 G/DL (ref 3.5–5)
ALBUMIN/GLOB SERPL: 2.1 {RATIO} (ref 1.4–2.6)
ALP SERPL-CCNC: 72 U/L (ref 56–155)
ALT SERPL-CCNC: 25 U/L (ref 10–40)
ANION GAP SERPL CALC-SCNC: 22 MMOL/L (ref 8–19)
AST SERPL-CCNC: 30 U/L (ref 15–50)
BILIRUB SERPL-MCNC: 0.38 MG/DL (ref 0.2–1.3)
BUN SERPL-MCNC: 17 MG/DL (ref 5–25)
BUN/CREAT SERPL: 18 {RATIO} (ref 6–20)
CALCIUM SERPL-MCNC: 9 MG/DL (ref 8.7–10.4)
CHLORIDE SERPL-SCNC: 106 MMOL/L (ref 99–111)
CHOLEST SERPL-MCNC: 115 MG/DL (ref 107–200)
CHOLEST/HDLC SERPL: 2.6 {RATIO} (ref 3–6)
CONV CO2: 21 MMOL/L (ref 22–32)
CONV TOTAL PROTEIN: 6.5 G/DL (ref 6.3–8.2)
CREAT UR-MCNC: 0.93 MG/DL (ref 0.7–1.2)
GFR SERPLBLD BASED ON 1.73 SQ M-ARVRAT: >60 ML/MIN/{1.73_M2}
GLOBULIN UR ELPH-MCNC: 2.1 G/DL (ref 2–3.5)
GLUCOSE SERPL-MCNC: 126 MG/DL (ref 70–99)
HDLC SERPL-MCNC: 45 MG/DL (ref 40–60)
LDLC SERPL CALC-MCNC: 55 MG/DL (ref 70–100)
OSMOLALITY SERPL CALC.SUM OF ELEC: 301 MOSM/KG (ref 273–304)
POTASSIUM SERPL-SCNC: 4.5 MMOL/L (ref 3.5–5.3)
SODIUM SERPL-SCNC: 144 MMOL/L (ref 135–147)
TRIGL SERPL-MCNC: 73 MG/DL (ref 40–150)
VLDLC SERPL-MCNC: 15 MG/DL (ref 5–37)

## 2020-06-30 ENCOUNTER — HOSPITAL ENCOUNTER (OUTPATIENT)
Dept: OTHER | Facility: HOSPITAL | Age: 80
Discharge: HOME | End: 2020-06-30
Attending: FAMILY MEDICINE

## 2020-06-30 ENCOUNTER — OFFICE VISIT CONVERTED (OUTPATIENT)
Dept: FAMILY MEDICINE CLINIC | Age: 80
End: 2020-06-30
Attending: FAMILY MEDICINE

## 2020-06-30 LAB
ALBUMIN SERPL-MCNC: 4.4 G/DL (ref 3.5–5)
ALBUMIN/GLOB SERPL: 1.8 {RATIO} (ref 1.4–2.6)
ALP SERPL-CCNC: 63 U/L (ref 56–155)
ALT SERPL-CCNC: 23 U/L (ref 10–40)
ANION GAP SERPL CALC-SCNC: 16 MMOL/L (ref 8–19)
AST SERPL-CCNC: 27 U/L (ref 15–50)
BILIRUB SERPL-MCNC: 0.52 MG/DL (ref 0.2–1.3)
BUN SERPL-MCNC: 14 MG/DL (ref 5–25)
BUN/CREAT SERPL: 16 {RATIO} (ref 6–20)
CALCIUM SERPL-MCNC: 9.4 MG/DL (ref 8.7–10.4)
CHLORIDE SERPL-SCNC: 103 MMOL/L (ref 99–111)
CHOLEST SERPL-MCNC: 109 MG/DL (ref 107–200)
CHOLEST/HDLC SERPL: 2.5 {RATIO} (ref 3–6)
CONV CO2: 25 MMOL/L (ref 22–32)
CONV TOTAL PROTEIN: 6.8 G/DL (ref 6.3–8.2)
CREAT UR-MCNC: 0.87 MG/DL (ref 0.7–1.2)
GFR SERPLBLD BASED ON 1.73 SQ M-ARVRAT: >60 ML/MIN/{1.73_M2}
GLOBULIN UR ELPH-MCNC: 2.4 G/DL (ref 2–3.5)
GLUCOSE SERPL-MCNC: 117 MG/DL (ref 70–99)
HDLC SERPL-MCNC: 43 MG/DL (ref 40–60)
LDLC SERPL CALC-MCNC: 40 MG/DL (ref 70–100)
OSMOLALITY SERPL CALC.SUM OF ELEC: 290 MOSM/KG (ref 273–304)
POTASSIUM SERPL-SCNC: 5 MMOL/L (ref 3.5–5.3)
SODIUM SERPL-SCNC: 139 MMOL/L (ref 135–147)
TRIGL SERPL-MCNC: 132 MG/DL (ref 40–150)
VLDLC SERPL-MCNC: 26 MG/DL (ref 5–37)

## 2020-11-18 ENCOUNTER — OFFICE VISIT (OUTPATIENT)
Dept: CARDIOLOGY | Facility: CLINIC | Age: 80
End: 2020-11-18

## 2020-11-18 VITALS
BODY MASS INDEX: 28.72 KG/M2 | WEIGHT: 183 LBS | SYSTOLIC BLOOD PRESSURE: 136 MMHG | HEART RATE: 53 BPM | DIASTOLIC BLOOD PRESSURE: 90 MMHG | HEIGHT: 67 IN

## 2020-11-18 DIAGNOSIS — I10 ESSENTIAL HYPERTENSION: ICD-10-CM

## 2020-11-18 DIAGNOSIS — I25.810 CORONARY ARTERY DISEASE INVOLVING CORONARY BYPASS GRAFT OF NATIVE HEART WITHOUT ANGINA PECTORIS: Primary | ICD-10-CM

## 2020-11-18 DIAGNOSIS — I45.10 RBBB: ICD-10-CM

## 2020-11-18 DIAGNOSIS — I65.22 CAROTID STENOSIS, ASYMPTOMATIC, LEFT: ICD-10-CM

## 2020-11-18 DIAGNOSIS — E78.2 MIXED HYPERLIPIDEMIA: ICD-10-CM

## 2020-11-18 PROCEDURE — 99214 OFFICE O/P EST MOD 30 MIN: CPT | Performed by: INTERNAL MEDICINE

## 2020-11-18 PROCEDURE — 93000 ELECTROCARDIOGRAM COMPLETE: CPT | Performed by: INTERNAL MEDICINE

## 2020-11-18 RX ORDER — LISINOPRIL 10 MG/1
10 TABLET ORAL DAILY
COMMUNITY
Start: 2020-09-29 | End: 2021-10-27 | Stop reason: SDUPTHER

## 2020-11-18 RX ORDER — AZELASTINE 1 MG/ML
2 SPRAY, METERED NASAL 2 TIMES DAILY
COMMUNITY
Start: 2020-10-13 | End: 2021-10-27

## 2020-11-18 RX ORDER — CETIRIZINE HYDROCHLORIDE 10 MG/1
10 TABLET ORAL DAILY
COMMUNITY
End: 2022-10-27 | Stop reason: SDUPTHER

## 2020-11-18 RX ORDER — MONTELUKAST SODIUM 10 MG/1
10 TABLET ORAL NIGHTLY
COMMUNITY
End: 2022-10-27 | Stop reason: SDUPTHER

## 2020-11-18 NOTE — PROGRESS NOTES
Date of Office Visit: 20  Encounter Provider: Elian Rizo MD  Place of Service: [facility] CARDIOLOGY  Patient Name: Mukesh Beard Jr.  :1940  Referral Provider:No ref. provider found      Chief Complaint   Patient presents with   • Coronary Artery Disease     History of Present Illness  Mr. Beard is a pleasant 80-year-old gentleman with history of ischemic heart disease,  mild left ventricular systolic dysfunction, severe three-vessel disease, and left main  disease. He had coronary artery bypass grafting in May 2000 where he had left internal  mammary graft to left anterior descending, vein graft to the diagonal, vein graft to the  obtuse marginal, vein graft to the second obtuse marginal, and vein graft to the posterior  descending artery. Then, in , he had some vertigo with a negative carotid study and  unremarkable echo. He comes in for followup now.  The patient denies chest pain, pressure and heaviness. No shortness of breath, othopnea or PND. No palpitations, near syncope or syncope. No stroke type symptoms like paralysis, paresthesia, abrupt vision loss and dysarthria. No bleeding like blood in the stool or dark stools.  He is having a lot of eye problems has developed glaucoma and is been allergic to a number of medications.  He still exercising walking a mile and a half and 23 minutes 7 days a week.    Coronary Artery Disease  Pertinent negatives include no dizziness, muscle weakness or weight gain. There is no history of past myocardial infarction.         Past Medical History:   Diagnosis Date   • Anesthesia complication     son woke up during surgery  knee replacement   • Ankylosing spondylitis of site in spine (CMS/[facility])    • Arthritis    • ASCVD (arteriosclerotic cardiovascular disease)     mild lv dysfunction   • Coronary artery disease May 2000   • Diabetes mellitus (CMS/[facility])     high b/s diet controlled   • GERD (gastroesophageal reflux  disease)    • Glaucoma    • Hyperlipidemia    • Hypertension    • 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)    • Myocardial infarction (CMS/HCC)    • Osteoarthritis    • Ringing in ear, bilateral    • Vertigo 04/2011         Past Surgical History:   Procedure Laterality Date   • APPENDECTOMY     • CATARACT EXTRACTION     • CHOLECYSTECTOMY     • CORONARY ARTERY BYPASS GRAFT  04/2000   • EPIDURAL BLOCK     • EYE SURGERY      rt tear duct sx   • HAND SURGERY Bilateral    • JOINT REPLACEMENT     • SHOULDER SURGERY     • TOTAL SHOULDER ARTHROPLASTY W/ DISTAL CLAVICLE EXCISION Left 4/12/2018    Procedure: Reverse Total Shoulder Arthroplsty;  Surgeon: Ja Archer MD;  Location: [facility];  Service: Orthopedics         Current Outpatient Medications on File Prior to Visit   Medication Sig Dispense Refill   • aspirin 81 MG tablet Take 1 tablet by mouth Every Night. Resume 4/14/18     • azelastine (ASTELIN) 0.1 % nasal spray 2 sprays into the nostril(s) as directed by provider 2 (Two) Times a Day.     • carBAMazepine XR (TEGretol  XR) 100 MG 12 hr tablet Take 100 mg by mouth 2 (Two) Times a Day.     • cetirizine (zyrTEC) 10 MG tablet Take 10 mg by mouth Daily.     • Cholecalciferol (VITAMIN D3) 2000 units capsule Take 2,000 Units by mouth Every Night.     • Cinnamon 500 MG capsule Take 500 mg by mouth Every Morning. 2000mg qd     • doxycycline (VIBRAMYCIN) 100 MG capsule Take 100 mg by mouth Every Morning.     • Flaxseed, Linseed, (FLAX SEED OIL) 1000 MG capsule Take 1,200 mg by mouth Every Morning.     • lisinopril (PRINIVIL,ZESTRIL) 10 MG tablet Take 10 mg by mouth Daily.     • loteprednol (ALREX) 0.2 % suspension Administer 1 drop to both eyes 2 (Two) Times a Day.     • meloxicam (MOBIC) 15 MG tablet Take 15 mg by mouth Every Night.     • metoprolol succinate XL (TOPROL-XL) 25 MG 24 hr tablet Take 12.5 mg by mouth Every Night.     • montelukast (SINGULAIR) 10 MG tablet Take 10 mg by mouth Every Night.      • niacin 500 MG tablet Take 1,000 mg by mouth Every Night.     • Omega-3 Fatty Acids (FISH OIL) 1200 MG capsule capsule Take 1,200 mg by mouth Daily With Breakfast.     • rosuvastatin (CRESTOR) 20 MG tablet Take 20 mg by mouth Every Night.     • [DISCONTINUED] raNITIdine (ZANTAC) 300 MG tablet Take 300 mg by mouth Every Night.       No current facility-administered medications on file prior to visit.          Social History     Socioeconomic History   • Marital status:      Spouse name: Not on file   • Number of children: Not on file   • Years of education: Not on file   • Highest education level: Not on file   Tobacco Use   • Smoking status: Former Smoker     Types: Cigarettes     Quit date:      Years since quittin.9   • Smokeless tobacco: Never Used   • Tobacco comment: caffeine use-coffee   Substance and Sexual Activity   • Alcohol use: Yes     Comment: RARE   • Drug use: No   • Sexual activity: Not Currently     Partners: Female   Lifestyle   • Physical activity     Days per week: 7 days     Minutes per session: 30 min   • Stress: Not on file       Family History   Problem Relation Age of Onset   • Heart disease Mother         valvular heart disease   • Malig Hyperthermia Neg Hx          Review of Systems   Constitution: Negative for decreased appetite, diaphoresis, fever, malaise/fatigue, weight gain and weight loss.   HENT: Negative for congestion, hearing loss, nosebleeds and tinnitus.    Eyes: Positive for blurred vision and redness. Negative for double vision, vision loss in left eye, vision loss in right eye and visual disturbance.   Cardiovascular:        As noted in HPI   Respiratory:        As noted HPI   Endocrine: Negative for cold intolerance and heat intolerance.   Hematologic/Lymphatic: Negative for bleeding problem. Does not bruise/bleed easily.   Skin: Positive for itching. Negative for color change, flushing and rash.   Musculoskeletal: Negative for arthritis, back pain,  "joint pain, joint swelling, muscle weakness and myalgias.   Gastrointestinal: Negative for bloating, abdominal pain, constipation, diarrhea, dysphagia, heartburn, hematemesis, hematochezia, melena, nausea and vomiting.   Genitourinary: Negative for bladder incontinence, dysuria, frequency, nocturia and urgency.   Neurological: Negative for dizziness, focal weakness, headaches, light-headedness, loss of balance, numbness, paresthesias, vertigo and weakness.   Psychiatric/Behavioral: Negative for depression, memory loss and substance abuse.       Procedures      ECG 12 Lead    Date/Time: 11/18/2020 12:14 PM  Performed by: Elian Rizo MD  Authorized by: Elian Rizo MD   Comparison: compared with previous ECG   Similar to previous ECG  Rhythm: sinus rhythm  Rate: normal  Conduction: 1st degree AV block  QRS axis: normal                  Objective:    /90 (BP Location: Right arm)   Pulse 53   Ht 170.2 cm (67\")   Wt 83 kg (183 lb)   BMI 28.66 kg/m²        Physical Exam  Vitals signs reviewed.   Constitutional:       General: Not in acute distress.     Appearance: Well-developed. Not diaphoretic.   Eyes:      General: No scleral icterus.     Conjunctiva/sclera: Conjunctivae normal.      Pupils: Pupils are equal, round, and reactive to light.   HENT:      Head: Normocephalic.   Neck:      Musculoskeletal: No edema or erythema.      Thyroid: No thyromegaly.      Vascular: Normal carotid pulses. No carotid bruit, hepatojugular reflux or JVD.      Trachea: No tracheal deviation.   Pulmonary:      Effort: Pulmonary effort is normal. No respiratory distress.      Breath sounds: Normal breath sounds. No decreased breath sounds. No wheezing. No rhonchi. No rales.   Chest:      Chest wall: Not tender to palpatation.   Cardiovascular:      PMI at left midclavicular line. Normal rate. Regular rhythm. S1 with normal intensity. S2 with normal intensity.      Murmurs: There is a grade 2/6 systolic murmur at the " URSB.      No gallop. No click. No rub.   Pulses:     Carotid: 2+ with bruit bilaterally.     Radial: 1+ on the left side and 2+ on the right side.     Femoral: 2+ bilaterally.     Dorsalis pedis: 2+ bilaterally.     Posterior tibial: 2+ bilaterally.  Edema:     Peripheral edema absent.   Abdominal:      General: Bowel sounds are normal. There is no distension.      Palpations: Abdomen is soft. There is no abdominal mass.      Tenderness: There is no abdominal tenderness. There is no guarding or rebound.   Musculoskeletal:         General: No tenderness or deformity.      Extremities: No clubbing present.  Skin:     General: Skin is warm and dry. There is no cyanosis.      Coloration: Skin is not pale.      Findings: No erythema or rash.   Neurological:      Mental Status: Alert and oriented to person, place, and time.   Psychiatric:         Speech: Speech normal.         Behavior: Behavior normal.         Thought Content: Thought content normal.         Judgment: Judgment normal.             Assessment:   1. This is a 80-year-old gentleman with history of severe ischemic heart disease and mild left ventricular systolic dysfunction status post coronary artery bypass grafting in May  2000. Echocardiogram done August 2011 showed an ejection fraction of 54% with mild mitral insufficiency and normal RV systolic pressure. Perfusion stress test 11/13 was unremarkable.  Echocardiogram 2018 normal LV systolic function mild aortic insufficiency no significant valvular disease.  He will continue the same see us in follow-up in 1 year and call if problems.  2. History of hyperlipidemia, on rosuvastatin.   3. Bilateral carotid stenosis.  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  4.  Hypertension blood pressure at goal.          Plan:

## 2021-04-23 ENCOUNTER — HOSPITAL ENCOUNTER (OUTPATIENT)
Dept: OTHER | Facility: HOSPITAL | Age: 81
Discharge: HOME | End: 2021-04-23
Attending: FAMILY MEDICINE

## 2021-04-23 ENCOUNTER — OFFICE VISIT CONVERTED (OUTPATIENT)
Dept: FAMILY MEDICINE CLINIC | Age: 81
End: 2021-04-23
Attending: FAMILY MEDICINE

## 2021-04-23 LAB
ALBUMIN SERPL-MCNC: 4.5 G/DL (ref 3.5–5)
ALBUMIN/GLOB SERPL: 1.9 {RATIO} (ref 1.4–2.6)
ALP SERPL-CCNC: 78 U/L (ref 56–155)
ALT SERPL-CCNC: 67 U/L (ref 10–40)
ANION GAP SERPL CALC-SCNC: 17 MMOL/L (ref 8–19)
AST SERPL-CCNC: 32 U/L (ref 15–50)
BILIRUB SERPL-MCNC: 0.47 MG/DL (ref 0.2–1.3)
BUN SERPL-MCNC: 15 MG/DL (ref 5–25)
BUN/CREAT SERPL: 15 {RATIO} (ref 6–20)
CALCIUM SERPL-MCNC: 9 MG/DL (ref 8.7–10.4)
CHLORIDE SERPL-SCNC: 105 MMOL/L (ref 99–111)
CHOLEST SERPL-MCNC: 108 MG/DL (ref 107–200)
CHOLEST/HDLC SERPL: 2.8 {RATIO} (ref 3–6)
CONV CO2: 25 MMOL/L (ref 22–32)
CONV TOTAL PROTEIN: 6.9 G/DL (ref 6.3–8.2)
CREAT UR-MCNC: 1.03 MG/DL (ref 0.7–1.2)
GFR SERPLBLD BASED ON 1.73 SQ M-ARVRAT: >60 ML/MIN/{1.73_M2}
GLOBULIN UR ELPH-MCNC: 2.4 G/DL (ref 2–3.5)
GLUCOSE SERPL-MCNC: 99 MG/DL (ref 70–99)
HDLC SERPL-MCNC: 38 MG/DL (ref 40–60)
LDLC SERPL CALC-MCNC: 41 MG/DL (ref 70–100)
OSMOLALITY SERPL CALC.SUM OF ELEC: 295 MOSM/KG (ref 273–304)
POTASSIUM SERPL-SCNC: 4.7 MMOL/L (ref 3.5–5.3)
SODIUM SERPL-SCNC: 142 MMOL/L (ref 135–147)
TRIGL SERPL-MCNC: 147 MG/DL (ref 40–150)
VLDLC SERPL-MCNC: 29 MG/DL (ref 5–37)

## 2021-05-18 NOTE — PROGRESS NOTES
Mukesh Beard. 1940     Office/Outpatient Visit    Visit Date: Fri, Dec 21, 2018 09:55 am    Provider: Alexandra Barnes MD (Assistant: Selene Cabrera MA)    Location: [facility]        Electronically signed by Alexandra Barnes MD on  12/21/2018 12:30:48 PM                             SUBJECTIVE:        CC:     Kiran is a 78 year old White male.  This is a follow-up visit.  Medicare Wellness         HPI:     He is UTD on colonoscopy, last done 1/2018 and this showed tubular adenoma x2.  Three year repeat recommended.  Prostate cancer screening is no longer indicated.  AAA screening is not indicated.  He is UTD on Pneumovax (6/2015), Prevnar (11/2017), Zostavax (8/2010), Tdap (2/2013), and flu (10/2018).  He is due for Shingrix and Havrix.  He is due for routine labs including CMP and lipids.     Kiran is here for a Medicare wellness visit.  Kiran is here for a Medicare wellness visit.  ADVANCED DIRECTIVES: None     Returning to health checkup, the required HRA questions are integrated within this visit note. Family medical history and individual medical/surgical history were reviewed and updated.  A current height, weight, BMI, blood pressure, and pulse were recorded in the vitals section of the note and have been reviewed. Patient's medications, including supplements, were recorded in the chart and reviewed.  Current providers and suppliers were reviewed and updated.          Self-Assessment of Health: He rates his health as good. He rates his confidence of being able to control/manage most of his health problems as somewhat confident. His physical/emotional health has limited his social activites moderately.  A review of cognitive impairment was performed, including ability to drive a car, manage finances, and any memory changes, and was found to be negative.  A review of functional ability, including bathing, dressing, walking, and urine/bowel continence as well as level of safety was performed  and was found to be negative.  Falls Risk: Has not had any falls or only one fall without injury in the past year.  In regard to hearing, he reports having to strain to hear or understand conversations, but not having trouble hearing the TV/radio when others do not or wearing hearing aid(s).  Concerning home safety, he reports that at home he DOES have adequate lighting, a skid resistant shower/tub, grab bars in the bath, handrails on stairs and functioning smoke alarms, but not absence of throw rugs.  Physical Activity: He exercises for at least 20 minutes 3 or more days/week.; Type of diet patient normally eats is described as healthy heart Tobacco: He has a past history of cigarette smoking; quit date:  1972.  Preventative Health updated today.          PHQ-9 Depression Screening: Completed form scanned and in chart; Total Score 1 Alcohol Consumption Screening: Completed form scanned and in chart; Total Score 0     ROS:     CONSTITUTIONAL:  Negative for fatigue, unintentional weight gain and unintentional weight loss.      EYES:  Positive for use of glasses.   Negative for blurred vision.      E/N/T:  Negative for diminished hearing and nasal congestion.      CARDIOVASCULAR:  Negative for chest pain and palpitations.      RESPIRATORY:  Negative for recent cough and dyspnea.      GASTROINTESTINAL:  Negative for abdominal pain, constipation, diarrhea, nausea and vomiting.      MUSCULOSKELETAL:  Positive for arthralgias and (L shoulder - still having some pain adn popping; ROM is much improved, however) back pain.   Negative for joint stiffness or myalgias.      NEUROLOGICAL:  Negative for paresthesias, vertigo and weakness.      PSYCHIATRIC:  Negative for anxiety, depression and sleep disturbance.          PMH/FMH/SH:     Last Reviewed on 12/21/2018 10:15 AM by Alexandra Barnes    Past Medical History:             PAST MEDICAL HISTORY         Coronary Artery Disease    Hyperlipidemia    Hypertension    LV  dysfunction     Gastroesophageal Reflux Disease     Allergies         CURRENT MEDICAL PROVIDERS:    Cardiologist: Dr. Rizo    Orthopedist: Dr. Stroud         PREVENTIVE HEALTH MAINTENANCE             COLORECTAL CANCER SCREENING: Up to date (colonoscopy q10y; sigmoidoscopy q5y; Cologuard q3y) was last done 1/5/2018, Results are in chart; colonoscopy with the following abnormalities noted-- tubular adenoma X2; The next colonoscopy is due  3 years     PSA: was last done 11/2/17 with normal results         PAST MEDICAL HISTORY                 ADVANCED DIRECTIVES: None         Surgical History:         Appendectomy    Coronary Artery Bypass Graft: 5-V 5/2000;     Cholecystectomy    Joint Replacement: L shoulder 4/2018;      bilateral hand surgery;         Family History:     Father: Leukemia         Social History:     Occupation:    Retired     Marital Status:      Children: 3 children         Tobacco/Alcohol/Supplements:     Last Reviewed on 12/21/2018 10:15 AM by Alexandra Barnes    Tobacco: He has a past history of cigarette smoking; quit date:  1972.          Substance Abuse History:     Last Reviewed on 12/21/2018 10:15 AM by Alexandra Barnes        Mental Health History:     Last Reviewed on 12/21/2018 10:15 AM by Alexandra Barnes        Communicable Diseases (eg STDs):     Last Reviewed on 12/21/2018 10:15 AM by Alexandra Barnes            Current Problems:     Last Reviewed on 6/11/2018 11:14 AM by Alexandra Barnes    Degenerative arthritis of knee     Mixed hyperlipidemia     Hypertension     CAD     Allergies     Gastritis, NEC         Immunizations:     Prevnar 13 (Pneumococcal PCV 13) 11/2/2017     Fluzone High-Dose pf (>=65 yr) 12/9/2015     Fluzone High-Dose pf (>=65 yr) 12/2/2016     Fluzone High-Dose pf (>=65 yr) 11/2/2017     Fluzone High-Dose pf (>=65 yr) 10/12/2018     PNEUMOVAX 23 (Pneumococcal PPV23) 6/1/2015     Tdap (Tetanus, reduced diph, acellular pertussis) 2/1/2013     Zostavax (Zoster  live) 8/1/2010         Allergies:     Last Reviewed on 12/21/2018 10:02 AM by Selene Cabrera      No Known Drug Allergies.         Current Medications:     Last Reviewed on 12/21/2018 10:06 AM by Selene Cabrera    Montelukast Sodium 10mg Tablet 1 TAB DAILY     Ranitidine 300mg Tablet One tablet by mouth daily     Alrex 0.2% Ophthalmic Suspension INSTILL 1 DROP BOTH EYES BID     Doxycycline  Hyclate 100mg Capsules Take 1 capsule(s) by mouth bid     Ipratropium Bromide 0.06% Nasal Spray     Lotemax 0.5% Ophthalmic Gel 1 DROP IN RIGHT EYE BID     Aspirin (ASA) 81mg Tablets, Enteric Coated 1 tab daily     Cetirizine HCl 10mg Tablet 1 tab daily     cinnamon 2000mg 1 po daily     Fish Oil 1,200mg Softgel capsule Take 1 capsule(s) by mouth daily     flaxseed oil 1200 mg 1 po daily     Niacin (Nicotinic Acid) 500mg Tablet Take 2 tablet(s) by mouth daily     Vitamin D3 1,000IU Tablet 2 tab daily     Meloxicam 15mg Tablet 1 tab PRN daily     Metoprolol Succinate 25mg Tablets, Extended Release Take 1/2 tab by mouth  daily     Rosuvastatin 40mg Tablet 1/2 tab daily         OBJECTIVE:        Vitals:         Current: 12/21/2018 10:12:19 AM    Ht:  5 ft, 8.25 in;  Wt: 183.2 lbs;  BMI: 27.7    T: 98 F (oral);  BP: 157/88 mm Hg (right arm, sitting);  P: 52 bpm (right arm (BP Cuff), sitting);  sCr: 0.94 mg/dL;  GFR: 61.68    VA: 20/25 OD, 20/30 OS (with correction)        Repeat:     10:34:20 AM     BP:   168/69mm Hg (left arm, sitting)         Exams:     PHYSICAL EXAM:     GENERAL: Vitals recorded well developed, well nourished;  well groomed;  no apparent distress;     EYES: extraocular movements intact; conjunctiva and cornea are normal; PERRLA;     E/N/T: OROPHARYNX:  normal mucosa, dentition, gingiva, and posterior pharynx;     RESPIRATORY: normal respiratory rate and pattern with no distress; normal breath sounds with no rales, rhonchi, wheezes or rubs;     CARDIOVASCULAR: normal rate; rhythm is regular;  no systolic  murmur; no edema;     GASTROINTESTINAL: nontender; normal bowel sounds;     MUSCULOSKELETAL: normal gait; normal overall tone     NEUROLOGIC: mental status: alert and oriented x 3; reflexes: brachioradialis: 2+; knee jerks: 2+;     PSYCHIATRIC: appropriate affect and demeanor; normal psychomotor function; normal speech pattern;         ASSESSMENT           V70.0   Z00.00  Health checkup              DDx:     V79.0   Z13.89  Screening for depression              DDx:     414.01   I25.10  CAD              DDx:         ORDERS:         Meds Prescribed:       Refill of: Meloxicam 15mg Tablet 1 tab PRN daily  #90 (Ninety) tablet(s) Refills: 1       Refill of: Ranitidine 300mg Tablet One tablet by mouth daily  #90 (Ninety) tablet(s) Refills: 1       Refill of: Metoprolol Succinate 25mg Tablets, Extended Release Take 1/2 tab by mouth  daily  #90 (Ninety) tablet(s) Refills: 1         Radiology/Test Orders:       3017F  Colorectal CA screen results documented and reviewed (PV)  (In-House)           Lab Orders:       [id number]  HTNLP - [facility] CMP AND LIPID: [id number]  (Send-Out)         APPTO  Appointment need  (In-House)           Procedures Ordered:         Annual wellness visit, includes a PPPS, subsequent visit  (In-House)           Other Orders:         Depression screen negative  (In-House)         1101F  Pt screen for fall risk; document no falls in past year or only 1 fall w/o injury in past year (DIMITRI)  (In-House)           Negative EtOH screen  (In-House)           Calculated BMI above the upper parameter and a follow-up plan was documented in the medical record  (In-House)                   PLAN:          Health checkup No fall risk, no memory issues, no signs/symptoms of depression.  He lives with his wife.  He is able to drive and perform ADLs/manages finances independently.   Hearing is adequate.  He has a living will and preventive services handout and safety handout were given to him.  Current  doctor list updated.  RTC 6 months.     MIPS PHQ-9 Depression Screening Completed form scanned and in chart; Total Score 1 Negative alcohol screen     COLORECTAL CANCER SCREENING: Results are in chart     BMI Elevated - Follow-Up Plan: He was provided education on weight loss strategies     FOLLOW-UP: Schedule a follow-up visit in 6 months..  f/u CAD with [place]           Prescriptions:       Refill of: Meloxicam 15mg Tablet 1 tab PRN daily  #90 (Ninety) tablet(s) Refills: 1       Refill of: Ranitidine 300mg Tablet One tablet by mouth daily  #90 (Ninety) tablet(s) Refills: 1       Refill of: Metoprolol Succinate 25mg Tablets, Extended Release Take 1/2 tab by mouth  daily  #90 (Ninety) tablet(s) Refills: 1           Orders:         Annual wellness visit, includes a PPPS, subsequent visit  (In-House)           Depression screen negative  (In-House)         1101F  Pt screen for fall risk; document no falls in past year or only 1 fall w/o injury in past year (DIMITRI)  (In-House)           Negative EtOH screen  (In-House)         3017F  Colorectal CA screen results documented and reviewed (PV)  (In-House)           Calculated BMI above the upper parameter and a follow-up plan was documented in the medical record  (In-House)         APPTO  Appointment need  (In-House)             Patient Education Handouts:       Physical Exam 65+ year, Male.1           CAD     LABORATORY:  Labs ordered to be performed today include HTN/Lipid Panel: CMP, Lipid.            Orders:       [id number]  HTN - [facility] CMP AND LIPID: [id number], [id number]  (Send-Out)               Patient Recommendations:        For  Health checkup:     Schedule a follow-up visit in 6 months.                APPOINTMENT INFORMATION:        Monday Tuesday Wednesday Thursday Friday Saturday Sunday            Time:___________________AM  PM   Date:_____________________             CHARGE CAPTURE           **Please note: ICD descriptions below are intended for  billing purposes only and may not represent clinical diagnoses**        Primary Diagnosis:         V70.0 Health checkup            Z00.00    Encounter for general adult medical examination without abnormal findings              Orders:             Annual wellness visit, includes a PPPS, subsequent visit  (In-House)                Depression screen negative  (In-House)             1101F   Pt screen for fall risk; document no falls in past year or only 1 fall w/o injury in past year (DIMITRI)  (In-House)                Negative EtOH screen  (In-House)             3017F   Colorectal CA screen results documented and reviewed (PV)  (In-House)                Calculated BMI above the upper parameter and a follow-up plan was documented in the medical record  (In-House)             APPTO   Appointment need  (In-House)           V79.0 Screening for depression            Z13.89    Encounter for screening for other disorder    414.01 CAD            I25.10    Atherosclerotic heart disease of native coronary artery without angina pectoris

## 2021-05-18 NOTE — PROGRESS NOTES
Mukesh Beard  1940     Office/Outpatient Visit    Visit Date: Mon, Dec 30, 2019 09:20 am    Provider: Alexandra Barnes MD (Assistant: Nena Randhawa MA)    Location: [facility]        Electronically signed by Alexandra Barnes MD on  12/30/2019 12:28:56 PM                             Subjective:        CC: Kiran is a 79 year old White male.  Patient presents today for [facility] visit         HPI:       He is UTD on colonoscopy, last done 1/2018 and this showed tubular adenoma x2.  Ten year repeat recommended.  Prostate cancer screening is no longer indicated by age.  He is UTD on Pneumovax (6/2015), Prevnar (11/2014), Zostavax (8/2010), Tdap (2/2013), and flu (11/2019).  He is due for routine labs including HTN panel.      Kiran is here for a Medicare wellness visit.  The required HRA questions are integrated within this visit note. Family medical history and individual medical/surgical history were reviewed and updated.  A current height, weight, BMI, blood pressure, and pulse were recorded in the vitals section of the note and have been reviewed. Patient's medications, including supplements, were recorded in the chart and reviewed.  Current providers and suppliers were reviewed and updated.          Self-Assessment of Health: He rates his health as good. He rates his confidence of being able to control/manage most of his health problems as very confident. His physical/emotional health has limited his social activites not at all.  A review of cognitive impairment was performed, including ability to drive a car, manage finances, and any memory changes, and was found to be negative.  A review of functional ability, including bathing, dressing, walking, and urine/bowel continence as well as level of safety was performed and was found to be negative.  Falls Risk: Has not had any falls or only one fall without injury in the past year.  In regard to hearing, he reports having to strain to hear or  understand conversations, but not having trouble hearing the TV/radio when others do not or wearing hearing aid(s).  Concerning home safety, he reports that at home he DOES have a skid resistant shower/tub, grab bars in the bath, handrails on stairs and functioning smoke alarms, but not absence of throw rugs.          Immunization Status: Up to date; has not had Shingrix vacc; Physical Activity: He exercises for at least 20 minutes 3 or more days/week.; Type of diet patient normally eats is described as well-balanced with fruits and vegetables Tobacco: He has a past history of cigarette smoking; quit date:  1972.  Preventative Health updated today.            PHQ-9 Depression Screening: Completed form scanned and in chart; Total Score 0     ROS:     CONSTITUTIONAL:  Negative for fatigue, unintentional weight gain and unintentional weight loss.      EYES:  Positive for use of glasses.   Negative for blurred vision.      E/N/T:  Positive for nasal congestion and frequent rhinorrhea.   Negative for diminished hearing.      CARDIOVASCULAR:  Negative for chest pain and palpitations.      RESPIRATORY:  Negative for recent cough and dyspnea.      GASTROINTESTINAL:  Negative for abdominal pain, constipation, diarrhea, nausea and vomiting.      GENITOURINARY:  Negative for nocturia and change in urine stream.      MUSCULOSKELETAL:  Positive for arthralgias and back pain.   Negative for myalgias.      NEUROLOGICAL:  Negative for paresthesias and weakness.      PSYCHIATRIC:  Negative for anxiety, depression and sleep disturbance.          Past Medical History / Family History / Social History:         Last Reviewed on 12/30/2019 09:33 AM by Alexandra Barnes    Past Medical History:             PAST MEDICAL HISTORY         Coronary Artery Disease    Hyperlipidemia    Hypertension    LV dysfunction     Gastroesophageal Reflux Disease     Allergies         CURRENT MEDICAL PROVIDERS:    Cardiologist: Dr. Rizo    Orthopedist:  Dr. Stroud         PREVENTIVE HEALTH MAINTENANCE             COLORECTAL CANCER SCREENING: Up to date (colonoscopy q10y; sigmoidoscopy q5y; Cologuard q3y) was last done 1/5/2018, Results are in chart; colonoscopy with the following abnormalities noted-- tubular adenoma X2; The next colonoscopy is due  3 years     PSA: was last done 11/2/17 with normal results         PAST MEDICAL HISTORY                 ADVANCED DIRECTIVES: None         Surgical History:         Appendectomy    Coronary Artery Bypass Graft: 5-V 5/2000;     Cholecystectomy    Joint Replacement: L shoulder 4/2018;     bilateral hand surgery;         Family History:     Father: Leukemia         Social History:     Occupation:    Retired     Marital Status:      Children: 3 children         Tobacco/Alcohol/Supplements:     Last Reviewed on 12/30/2019 09:33 AM by Alexandra Barnes    Tobacco: He has a past history of cigarette smoking; quit date:  1972.          Substance Abuse History:     Last Reviewed on 12/30/2019 09:33 AM by Alexandra Barnes        Mental Health History:     Last Reviewed on 12/30/2019 09:33 AM by Alexandra Barnes        Communicable Diseases (eg STDs):     Last Reviewed on 12/30/2019 09:33 AM by Alexandra Barnes        Current Problems:     Last Reviewed on 12/12/2019 11:50 AM by Alexandra Barnes    Mixed hyperlipidemia    Essential (primary) hypertension    Atherosclerotic heart disease of native coronary artery without angina pectoris    Allergic rhinitis, unspecified    Other gastritis without bleeding    Bilateral primary osteoarthritis of knee    Rash and other nonspecific skin eruption    Neoplasm of uncertain behavior of skin        Immunizations:     Influenza, high dose seasonal 11/25/2019    Prevnar 13 (Pneumococcal PCV 13) 11/2/2017    Fluzone High-Dose pf (>=65 yr) 12/9/2015    Fluzone High-Dose pf (>=65 yr) 12/2/2016    Fluzone High-Dose pf (>=65 yr) 11/2/2017    Fluzone High-Dose pf (>=65 yr) 10/12/2018    PNEUMOVAX 23  (Pneumococcal PPV23) 6/1/2015    Tdap (Tetanus, reduced diph, acellular pertussis) 2/1/2013    Zostavax (Zoster live) 8/1/2010        Allergies:     Last Reviewed on 12/12/2019 11:50 AM by Alexandra Barnes    No Known Allergies.        Current Medications:     Last Reviewed on 12/12/2019 11:50 AM by Alexandra Barnes    aspirin 81 mg oral tablet, delayed release (enteric coated) [1 tab daily]    niacin 500 mg oral tablet [Take 2 tablet(s) by mouth daily]    Vitamin D3 25 mcg (1,000 unit) oral tablet [2 tab daily]    flaxseed oil 1200 mg 1 po daily     Fish Oil 360-1,200 mg oral capsule [Take 1 capsule(s) by mouth daily]    cinnamon 2000mg 1 po daily     meloxicam 15 mg oral tablet [TAKE 1 TABLET DAILY AS     NEEDED]    rosuvastatin 20 mg oral tablet [TAKE 1 TABLET AT BEDTIME]    Lotemax 0.5 % ophthalmic (eye) Drops, Gel [1 DROP IN RIGHT EYE BID]    Metoprolol Succinate 25 mg oral Tablet, Extended Release 24 hr [Take 1/2 tab by mouth  daily]    Ipratropium Bromide 42 mcg (0.06 %) Intranasal Spray, Non-Aerosol    doxycycline hyclate 100 mg oral capsule [Take 1 capsule(s) by mouth bid]    Alrex 0.2 % ophthalmic (eye) Drops, Suspension [INSTILL 1 DROP BOTH EYES BID ]    carBAMazepine 100 mg oral Tablet, Extended Release 12 hr [TAKE ONE TABLET BY MOUTH TWICE A DAY]    COMBIGAN     SOL 0.2/0.5%  [one gtt BID both eyes]    azelastine 137 mcg (0.1 %) Intranasal Aerosol, Spray [spray 1 sprays in each nostril by intranasal route 1 time per day]        Objective:        Vitals:         Current: 12/30/2019 9:25:32 AM    Ht:  5 ft, 8.25 in;  Wt: 182.6 lbs;  BMI: 27.6T: 97.6 F (oral);  BP: 184/69 mm Hg (left arm, sitting);  P: 48 bpm (left arm (BP Cuff), sitting);  sCr: 0.96 mg/dL;  GFR: 59.36VA: 20/20 OD, 20/20 OS (near, with correction)        Repeat:     9:52:50 AM  BP:   152/81mm Hg (left arm, sitting, HR:46)     Exams:     PHYSICAL EXAM:     GENERAL: Vitals recorded well developed, well nourished;  well groomed;  no apparent  distress;     EYES: extraocular movements intact; conjunctiva and cornea are normal; PERRLA;     E/N/T: OROPHARYNX:  normal mucosa, dentition, gingiva, and posterior pharynx;     RESPIRATORY: normal respiratory rate and pattern with no distress; normal breath sounds with no rales, rhonchi, wheezes or rubs;     CARDIOVASCULAR: normal rate; rhythm is regular;  no systolic murmur; no edema;     GASTROINTESTINAL: nontender; normal bowel sounds;     MUSCULOSKELETAL: normal gait; normal overall tone     NEUROLOGIC: mental status: alert and oriented x 3; reflexes: brachioradialis: 2+; knee jerks: 2+;     PSYCHIATRIC: appropriate affect and demeanor; normal psychomotor function; normal speech pattern;         Assessment:         Z00.00   Encounter for general adult medical examination without abnormal findings       Z13.31   Encounter for screening for depression       I10   HTN - Essential (primary) hypertension       E78.2   HLD - Mixed hyperlipidemia       I25.10   CAD - Atherosclerotic heart disease of native coronary artery without angina pectoris       G50.0   Trigeminal neuralgia           ORDERS:         Meds Prescribed:       [New Rx] lisinopril 10 mg oral tablet [take 1 tablet (10 mg) by oral route once daily], #90 (ninety) tablets, Refills: 1 (one)         Radiology/Test Orders:       3017F  Colorectal CA screen results documented and reviewed (PV)  (In-House)              Lab Orders:       [id number]  HTN - [facility] CMP AND LIPID: [id number]  (Send-Out)            APPTO  Appointment need  (In-House)              Procedures Ordered:         Annual wellness visit, includes a PPPS, subsequent visit  (In-House)              Other Orders:         Depression screen negative  (In-House)            1101F  Pt screen for fall risk; document no falls in past year or only 1 fall w/o injury in past year (DIMITRI)  (In-House)                      Plan:         Encounter for general adult medical examination without abnormal  findingsHe is UTD on colonoscopy, last done 1/2018 and this showed tubular adenoma x2.  Ten year repeat recommended.  Prostate cancer screening is no longer indicated by age.  He is UTD on Pneumovax (6/2015), Prevnar (11/2014), Zostavax (8/2010), Tdap (2/2013), and flu (11/2019).  He is due for routine labs including HTN panel; ordered.  No fall risk, no memory issues, no signs/symptoms of depression.  He lives with his wife.  He is able to drive and perform ADLs/manages finances independently.   Hearing is adequate.  He does not have a living will.  Preventive services handout and safety handout were given to him.  Current doctor list updated.  RTC 6 months.    MIPS PHQ-9 Depression Screening: Completed form scanned and in chart; Total Score 0; Negative Depression Screen ADVANCED DIRECTIVES: None         FOLLOW-UP: Schedule a follow-up visit in 6 months.:.  f/u HTN with [place]          Orders:         Annual wellness visit, includes a PPPS, subsequent visit  (In-House)              Depression screen negative  (In-House)            1101F  Pt screen for fall risk; document no falls in past year or only 1 fall w/o injury in past year (DIMITRI)  (In-House)            3017F  Colorectal CA screen results documented and reviewed (PV)  (In-House)            APPTO  Appointment need  (In-House)              HTN - Essential (primary) hypertensionNo refills needed.  Checking labs.    LABORATORY:  Labs ordered to be performed today include HTN/Lipid Panel: CMP, Lipid.            Prescriptions:       [New Rx] lisinopril 10 mg oral tablet [take 1 tablet (10 mg) by oral route once daily], #90 (ninety) tablets, Refills: 1 (one)           Orders:       [id number]  HTNLP - [facility] CMP AND LIPID: [id number], [id number]  (Send-Out)              HLD - Mixed hyperlipidemiaNo refills needed.  Checking labs.        CAD - Atherosclerotic heart disease of native coronary artery without angina pectorisNo refills needed.  Checking labs.        Trigeminal  neuralgiaRx sent 12/27/19 for carbamazepine.  He has been having trouble getting this; he will let us know if there are ongoing issues.            Patient Recommendations:        For  Encounter for general adult medical examination without abnormal findings:    Schedule a follow-up visit in 6 months.                APPOINTMENT INFORMATION:        Monday Tuesday Wednesday Thursday Friday Saturday Sunday            Time:___________________AM  PM   Date:_____________________             Charge Capture:         Primary Diagnosis:     Z00.00  Encounter for general adult medical examination without abnormal findings           Orders:        Annual wellness visit, includes a PPPS, subsequent visit  (In-House)              Depression screen negative  (In-House)            1101F  Pt screen for fall risk; document no falls in past year or only 1 fall w/o injury in past year (DIMITRI)  (In-House)            3017F  Colorectal CA screen results documented and reviewed (PV)  (In-House)            APPTO  Appointment need  (In-House)              Z13.31  Encounter for screening for depression     I10  HTN - Essential (primary) hypertension     E78.2  HLD - Mixed hyperlipidemia     I25.10  CAD - Atherosclerotic heart disease of native coronary artery without angina pectoris     G50.0  Trigeminal neuralgia

## 2021-05-18 NOTE — PROGRESS NOTES
Mukesh Beard  1940     Office/Outpatient Visit    Visit Date: Thu, Dec 12, 2019 11:14 am    Provider: Alexandra Barnes MD (Assistant: Adela Degroot RN)    Location: [facility]        Electronically signed by Alexandra Barnes MD on  12/12/2019 12:09:14 PM                             Subjective:        CC: Kiran is a 79 year old White male.  Rash around eyes         HPI: Kiran is here today with complaint of a rash around his eyes.  He has a spot on the L temple that has been present for about a year.        For the past 2-3 months, he has had a rash around both eyes.  Red, itchy, burning.  About 2 months ago, Dr. Mitchell made some adjustments to his eye drops, but he has gone back on everything that was stopped and nothing is improved.  No pain with eye movement.  No photophobia.  No fevers or chills.  No systemic sx.  He has been using Benadryl cream and that does help.    ROS:     CONSTITUTIONAL:  Negative for chills and fever.      EYES:  Negative for blurred vision.      CARDIOVASCULAR:  Negative for chest pain and palpitations.      RESPIRATORY:  Negative for recent cough and dyspnea.      GASTROINTESTINAL:  Negative for abdominal pain, nausea and vomiting.      INTEGUMENTARY:  Positive for rash.          Past Medical History / Family History / Social History:         Last Reviewed on 12/12/2019 11:50 AM by Alexandra Barnes    Past Medical History:             PAST MEDICAL HISTORY         Coronary Artery Disease    Hyperlipidemia    Hypertension    LV dysfunction     Gastroesophageal Reflux Disease     Allergies         CURRENT MEDICAL PROVIDERS:    Cardiologist: Dr. Rizo    Orthopedist: Dr. Stroud         PREVENTIVE HEALTH MAINTENANCE             COLORECTAL CANCER SCREENING: Up to date (colonoscopy q10y; sigmoidoscopy q5y; Cologuard q3y) was last done 1/5/2018, Results are in chart; colonoscopy with the following abnormalities noted-- tubular adenoma X2; The next colonoscopy is due  3  years     PSA: was last done 11/2/17 with normal results         PAST MEDICAL HISTORY                 ADVANCED DIRECTIVES: None         Surgical History:         Appendectomy    Coronary Artery Bypass Graft: 5-V 5/2000;     Cholecystectomy    Joint Replacement: L shoulder 4/2018;     bilateral hand surgery;         Family History:     Father: Leukemia         Social History:     Occupation:    Retired     Marital Status:      Children: 3 children         Tobacco/Alcohol/Supplements:     Last Reviewed on 12/12/2019 11:50 AM by Alexandra Barnes    Tobacco: He has a past history of cigarette smoking; quit date:  1972.          Substance Abuse History:     Last Reviewed on 12/12/2019 11:50 AM by Alexandra Barnes        Mental Health History:     Last Reviewed on 12/12/2019 11:50 AM by Alexandra Barnes        Communicable Diseases (eg STDs):     Last Reviewed on 12/12/2019 11:50 AM by Alexandra Barnes        Current Problems:     Last Reviewed on 6/18/2019 10:24 AM by Alexandra Barnes    Mixed hyperlipidemia    Essential (primary) hypertension    Atherosclerotic heart disease of native coronary artery without angina pectoris    Allergic rhinitis, unspecified    Other gastritis without bleeding    Bilateral primary osteoarthritis of knee    Allergies    Hypertension    Gastritis, NEC    CAD    Degenerative arthritis of knee    Mixed hyperlipidemia    Screening for depression    Encounter for screening for other disorder        Immunizations:     Prevnar 13 (Pneumococcal PCV 13) 11/2/2017    Fluzone High-Dose pf (>=65 yr) 12/9/2015    Fluzone High-Dose pf (>=65 yr) 12/2/2016    Fluzone High-Dose pf (>=65 yr) 11/2/2017    Fluzone High-Dose pf (>=65 yr) 10/12/2018    PNEUMOVAX 23 (Pneumococcal PPV23) 6/1/2015    Tdap (Tetanus, reduced diph, acellular pertussis) 2/1/2013    Zostavax (Zoster live) 8/1/2010        Allergies:     Last Reviewed on 6/18/2019 10:24 AM by Alexandra Barnes    No Known Allergies.        Current  Medications:     Last Reviewed on 6/18/2019 10:24 AM by Alexandra Barnes    aspirin 81 mg oral tablet, delayed release (enteric coated) [1 tab daily]    niacin 500 mg oral tablet [Take 2 tablet(s) by mouth daily]    Vitamin D3 25 mcg (1,000 unit) oral tablet [2 tab daily]    flaxseed oil 1200 mg 1 po daily     Fish Oil 360-1,200 mg oral capsule [Take 1 capsule(s) by mouth daily]    cinnamon 2000mg 1 po daily     Ranitidine 300mg Tablet [One tablet by mouth daily]    meloxicam 15 mg oral tablet [TAKE 1 TABLET DAILY AS     NEEDED]    rosuvastatin 20 mg oral tablet [TAKE 1 TABLET AT BEDTIME]    Lotemax 0.5 % ophthalmic (eye) Drops, Gel [1 DROP IN RIGHT EYE BID]    Metoprolol Succinate 25 mg oral Tablet, Extended Release 24 hr [Take 1/2 tab by mouth  daily]    Ipratropium Bromide 42 mcg (0.06 %) Intranasal Spray, Non-Aerosol    doxycycline hyclate 100 mg oral capsule [Take 1 capsule(s) by mouth bid]    Alrex 0.2 % ophthalmic (eye) Drops, Suspension [INSTILL 1 DROP BOTH EYES BID ]    Carbamazepine 100 mg oral Tablet, Extended Release 12 hr [Take 1 tablet(s) by mouth bid]        Objective:        Vitals:         Current: 12/12/2019 11:20:28 AM    Ht:  5 ft, 8.25 in;  Wt: 181 lbs;  BMI: 27.3T: 97.6 F (oral);  BP: 153/71 mm Hg (left arm, sitting);  P: 48 bpm (left arm (BP Cuff), sitting);  sCr: 0.96 mg/dL;  GFR: 59.13        Repeat:     11:23:49 AM  BP:   158/72mm Hg (left arm, sitting, pulse-47)     Exams:     PHYSICAL EXAM:     GENERAL: Vitals recorded well developed, well nourished;  well groomed;  no apparent distress;     EYES: lid edema, erythema and occasional vesicles over upper lid bilaterally, not extending past the lid itself on either side; erythema and edema of the lower lids bilaterally;  extraocular movements intact; conjunctiva and cornea are normal; PERRLA;     E/N/T: OROPHARYNX:  normal mucosa, dentition, gingiva, and posterior pharynx;     RESPIRATORY: normal respiratory rate and pattern with no distress;      SKIN: flat scaly plaque, 4 mm L temple;     MUSCULOSKELETAL: normal gait; normal overall tone         Assessment:         R21   Rash and other nonspecific skin eruption       D48.5   Neoplasm of uncertain behavior of skin           ORDERS:         Meds Prescribed:       [New Rx] azelastine 137 mcg (0.1 %) Intranasal Aerosol, Spray [spray 1 sprays in each nostril by intranasal route 1 time per day], #30 (thirty) milliliters, Refills: 0 (zero)         Procedures Ordered:       REFER  Referral to Specialist or Other Facility  (Send-Out)                      Plan:         Rash and other nonspecific skin eruptionUnclear etiology of this rash, but it does not appear to be shingles given the distribution.  Extremely limited (just to the lids) and bilateral.  Possibly allergic given that Benadryl cream helps.  He does not have any red flag sx.  Trying azelastine nasal spray for now, and will get him in with Dermatology for further eval (he has a spot on the L temple that needs to be addressed anyway -- either an AK or possibly an early SCC).  Avoiding Flonase due to increased intraocular pressure (not yet a formal glaucoma dx however).        REFERRALS:  Referral initiated to a dermatologist ( Dr. Grisel Land; for evaluation of bilateral eye rash and AK vs SCC L temple ).            Prescriptions:       [New Rx] azelastine 137 mcg (0.1 %) Intranasal Aerosol, Spray [spray 1 sprays in each nostril by intranasal route 1 time per day], #30 (thirty) milliliters, Refills: 0 (zero)           Orders:       REFER  Referral to Specialist or Other Facility  (Send-Out)              Neoplasm of uncertain behavior of skinAs above.            Charge Capture:         Primary Diagnosis:     R21  Rash and other nonspecific skin eruption           Orders:      [id number]  Office/outpatient visit; established patient, level 3  (In-House)              D48.5  Neoplasm of uncertain behavior of skin

## 2021-05-18 NOTE — PROGRESS NOTES
"Mukesh Beard 1940     Office/Outpatient Visit    Visit Date: Tue, Jun 18, 2019 09:58 am    Provider: Alexandra Barnes MD (Assistant: Nena Randhawa MA)    Location: [facility]        Electronically signed by Alexandra Barnes MD on  06/18/2019 04:55:11 PM                             SUBJECTIVE:        CC:     Kiran is a 78 year old White male.  Patient presents today for six month follow up (not taking Montelukast)         HPI: Kiran is here today to follow up on chronic issues.        He is on metoprolol succinate for HTN.  BP has been well controlled.  He does check this routinely at home.  No CP, palpitations, SOB.  H/o CAD, s/p 5 v. CABG.  He follows with Dr. Whiting ([facility]).  He is on rosuvastatin for cholesterol and CAD.  No myalgias.        He is on ranitidine for GERD.  No indigestion or reflux.        He is on meloxicam for chronic shoulder and back pain due to OA.        He is on cetirizine, montelukast and azelastine for allergies.  He went for allergy testing but they told him he didn't have any allergies.  He was given a nasal spray with ipratropium.  He was on Singulair previously but stopped that.  +Burning pain L face, +eyes watering.  He thought he might have had tooth pain but he went to the dentist and endodontist and they couldn't find anything wrong.  He has feels a \"tightness\" over the L face.  He has one episode of this previously, years ago, but that went away for the most part after a while.          Kiran presents with screening for depression.          PHQ-9 Depression Screening: Completed form scanned and in chart; Total Score 1 Alcohol Consumption Screening: Completed form scanned and in chart; Total Score 0     ROS:     CONSTITUTIONAL:  Negative for fatigue, unintentional weight gain and unintentional weight loss.      EYES:  Positive for use of glasses.   Negative for blurred vision.      E/N/T:  Positive for nasal congestion and frequent rhinorrhea.   Negative for " diminished hearing.      CARDIOVASCULAR:  Negative for chest pain and palpitations.      RESPIRATORY:  Negative for recent cough and dyspnea.      GASTROINTESTINAL:  Negative for abdominal pain, constipation, diarrhea, nausea and vomiting.      MUSCULOSKELETAL:  Positive for arthralgias and back pain.   Negative for myalgias.      NEUROLOGICAL:  Negative for paresthesias, vertigo and weakness.          PMH/FMH/SH:     Last Reviewed on 6/18/2019 10:24 AM by Alexandra Barnes    Past Medical History:             PAST MEDICAL HISTORY         Coronary Artery Disease    Hyperlipidemia    Hypertension    LV dysfunction     Gastroesophageal Reflux Disease     Allergies         CURRENT MEDICAL PROVIDERS:    Cardiologist: Dr. Rizo    Orthopedist: Dr. Stroud         PREVENTIVE HEALTH MAINTENANCE             COLORECTAL CANCER SCREENING: Up to date (colonoscopy q10y; sigmoidoscopy q5y; Cologuard q3y) was last done 1/5/2018, Results are in chart; colonoscopy with the following abnormalities noted-- tubular adenoma X2; The next colonoscopy is due  3 years     PSA: was last done 11/2/17 with normal results         PAST MEDICAL HISTORY                 ADVANCED DIRECTIVES: None         Surgical History:         Appendectomy    Coronary Artery Bypass Graft: 5-V 5/2000;     Cholecystectomy    Joint Replacement: L shoulder 4/2018;      bilateral hand surgery;         Family History:     Father: Leukemia         Social History:     Occupation:    Retired     Marital Status:      Children: 3 children         Tobacco/Alcohol/Supplements:     Last Reviewed on 6/18/2019 10:24 AM by Alexandra Barnes    Tobacco: He has a past history of cigarette smoking; quit date:  1972.          Substance Abuse History:     Last Reviewed on 6/18/2019 10:24 AM by Alexandra Barnes        Mental Health History:     Last Reviewed on 6/18/2019 10:24 AM by Alexandra Barnes        Communicable Diseases (eg STDs):     Last Reviewed on 6/18/2019 10:24 AM by  Alexandra Barnes            Current Problems:     Last Reviewed on 12/21/2018 10:15 AM by Alexandra Barnes    Degenerative arthritis of knee     Mixed hyperlipidemia     Hypertension     CAD     Allergies     Gastritis, NEC         Immunizations:     Prevnar 13 (Pneumococcal PCV 13) 11/2/2017     Fluzone High-Dose pf (>=65 yr) 12/9/2015     Fluzone High-Dose pf (>=65 yr) 12/2/2016     Fluzone High-Dose pf (>=65 yr) 11/2/2017     Fluzone High-Dose pf (>=65 yr) 10/12/2018     PNEUMOVAX 23 (Pneumococcal PPV23) 6/1/2015     Tdap (Tetanus, reduced diph, acellular pertussis) 2/1/2013     Zostavax (Zoster live) 8/1/2010         Allergies:     Last Reviewed on 12/21/2018 10:15 AM by Alexandra Barnes      No Known Drug Allergies.         Current Medications:     Last Reviewed on 12/21/2018 10:15 AM by Alexandra Barnes    Meloxicam 15mg Tablet 1 tab PRN daily     Metoprolol Succinate 25mg Tablets, Extended Release Take 1/2 tab by mouth  daily     Ranitidine 300mg Tablet One tablet by mouth daily     Montelukast Sodium 10mg Tablet 1 TAB DAILY     Rosuvastatin 40mg Tablet 1/2 tab daily     Alrex 0.2% Ophthalmic Suspension INSTILL 1 DROP BOTH EYES BID     Doxycycline  Hyclate 100mg Capsules Take 1 capsule(s) by mouth bid     Ipratropium Bromide 0.06% Nasal Spray     Lotemax 0.5% Ophthalmic Gel 1 DROP IN RIGHT EYE BID     Aspirin (ASA) 81mg Tablets, Enteric Coated 1 tab daily     cinnamon 2000mg 1 po daily     Fish Oil 1,200mg Softgel capsule Take 1 capsule(s) by mouth daily     flaxseed oil 1200 mg 1 po daily     Niacin (Nicotinic Acid) 500mg Tablet Take 2 tablet(s) by mouth daily     Vitamin D3 1,000IU Tablet 2 tab daily         OBJECTIVE:        Vitals:         Current: 6/18/2019 10:02:53 AM    Ht:  5 ft, 8.25 in;  Wt: 174.2 lbs;  BMI: 26.3    T: 97.7 F (oral);  BP: 171/69 mm Hg (left arm, sitting);  P: 49 bpm (right arm (BP Cuff), sitting);  sCr: 0.96 mg/dL;  GFR: 59.11        Repeat:     11:02:40 AM     BP:   184/85mm Hg (left  arm, sitting)         Exams:     PHYSICAL EXAM:     GENERAL: Vitals recorded well developed, well nourished;  well groomed;  no apparent distress;     EYES: extraocular movements intact; conjunctiva and cornea are normal; PERRLA;     E/N/T: OROPHARYNX:  normal mucosa, dentition, gingiva, and posterior pharynx;     RESPIRATORY: normal respiratory rate and pattern with no distress; normal breath sounds with no rales, rhonchi, wheezes or rubs;     CARDIOVASCULAR: normal rate; rhythm is regular;  no systolic murmur; no edema;     GASTROINTESTINAL: nontender; normal bowel sounds;     MUSCULOSKELETAL: normal gait; normal overall tone         ASSESSMENT           401.1   I10  Hypertension              DDx:     350.1   G50.0  Trigeminal neuralgia              DDx:     272.2   E78.2  Mixed hyperlipidemia              DDx:     414.01   I25.10  CAD              DDx:     477.0   J30.9  Allergies              DDx:     535.40   K29.60  Gastritis, NEC              DDx:     V79.0   Z13.89  Screening for depression              DDx:     V79.0   Z13.89  Screening for depression              DDx:         ORDERS:         Meds Prescribed:       Gabapentin 100mg Capsules 1-3 tabs PO QHS  #30 (Thirty) capsule(s) Refills: 0       Refill of: Meloxicam 15mg Tablet 1 tab PRN daily  #90 (Ninety) tablet(s) Refills: 1       Refill of: Metoprolol Succinate 25mg Tablets, Extended Release Take 1/2 tab by mouth  daily  #90 (Ninety) tablet(s) Refills: 1       Refill of: Ranitidine 300mg Tablet One tablet by mouth daily  #90 (Ninety) tablet(s) Refills: 1         Radiology/Test Orders:       3017F  Colorectal CA screen results documented and reviewed (PV)  (In-House)           Lab Orders:       APPTO  Appointment need  (In-House)           Other Orders:         Depression screen negative  (In-House)           Negative EtOH screen  (In-House)                   PLAN:          Hypertension +White coat HTN.  No changes to meds today.  He follows  "with [facility].  Refills sent. Will plan to check labs next visit.  RTC 6 months for AWV.     MIPS Vaccines Flu and Pneumonia updated in Shot record Colorectal Cancer Screening is up to date and the results are in the chart     FOLLOW-UP: Schedule a follow-up visit in 6 months..  Medicare wellness 30 min with Cameron           Prescriptions:       Refill of: Metoprolol Succinate 25mg Tablets, Extended Release Take 1/2 tab by mouth  daily  #90 (Ninety) tablet(s) Refills: 1           Orders:       3017F  Colorectal CA screen results documented and reviewed (PV)  (In-House)         APPTO  Appointment need  (In-House)            Trigeminal neuralgia His facial pain does not really seem consistent with allergies.  I am wondering if this could be trigeminal neuralgia instead.  Will do a short trial of gabapentin to see if he gets any effect.  If he likes the medication, can get him set up with a tox screen and contract for long-term use.  Deonte run today.   Could also consider switching to carbamazepine as \"I don't really like gabapentin -- I've seen other people have problems.\"  However, he is willing to try it for now.  He will let me know what if anything it does for him.  RTC 6 months for AWV or sooner prn.           Prescriptions:       Gabapentin 100mg Capsules 1-3 tabs PO QHS  #30 (Thirty) capsule(s) Refills: 0          Mixed hyperlipidemia Stable.  No refills needed.  Follows with Cardiology.          CAD Stable.  No refills needed.  Follows with Cardiology.          Allergies Singulair was ineffective.  No refills needed.          Gastritis, NEC Refills sent.           Prescriptions:       Refill of: Ranitidine 300mg Tablet One tablet by mouth daily  #90 (Ninety) tablet(s) Refills: 1          Screening for depression     MIPS PHQ-9 Depression Screening: Completed form scanned and in chart; Total Score 0; Negative Depression Screen Negative alcohol screen           Orders:         Depression screen negative  " (In-House)           Negative EtOH screen  (In-House)               Other Prescriptions:       Refill of: Meloxicam 15mg Tablet 1 tab PRN daily  #90 (Ninety) tablet(s) Refills: 1         Patient Recommendations:        For  Hypertension:     Schedule a follow-up visit in 6 months.                APPOINTMENT INFORMATION:        Monday Tuesday Wednesday Thursday Friday Saturday Sunday            Time:___________________AM  PM   Date:_____________________             CHARGE CAPTURE           **Please note: ICD descriptions below are intended for billing purposes only and may not represent clinical diagnoses**        Primary Diagnosis:         401.1 Hypertension            I10    Essential (primary) hypertension              Orders:          [id number]   Office/outpatient visit; established patient, level 4  (In-House)             3017F   Colorectal CA screen results documented and reviewed (PV)  (In-House)             APPTO   Appointment need  (In-House)           350.1 Trigeminal neuralgia            G50.0    Trigeminal neuralgia    272.2 Mixed hyperlipidemia            E78.2    Mixed hyperlipidemia    414.01 CAD            I25.10    Atherosclerotic heart disease of native coronary artery without angina pectoris    477.0 Allergies            J30.9    Allergic rhinitis, unspecified    535.40 Gastritis, NEC            K29.60    Other gastritis without bleeding    V79.0 Screening for depression            Z13.89    Encounter for screening for other disorder              Orders:             Depression screen negative  (In-House)                Negative EtOH screen  (In-House)           V79.0 Screening for depression            Z13.89    Encounter for screening for other disorder

## 2021-05-18 NOTE — PROGRESS NOTES
Mukesh Beard 1940     Office/Outpatient Visit    Visit Date: Mon, Jun 11, 2018 11:01 am    Provider: Alexandra Barnes MD (Assistant: Cristal Colin MA)    Location: [facility]        Electronically signed by Alexandra Barnes MD on  06/11/2018 01:59:38 PM                             SUBJECTIVE:        CC:     Kiran is a 77 year old White male.  This is a follow-up visit.  6 month check up HTN, HLD, CAD, OA         HPI: Kiran is here today for f/u of chronic issues.        He is on metoprolol succinate for HTN.  BP has been well controlled.  He does check this routinely at home.  No CP, palpitations, SOB.  H/o CAD, s/p 5 v. CABG.  He follows with Dr. Whiting ([facility]).  He is on rosuvastatin for cholesterol and CAD.  No myalgias or other adverse effects.        He is on ranitidine for GERD.        He is on meloxicam for chronic shoulder and back pain due to OA.  He was evaluated by Dr. Archer and had some epidurals there.  He recently underwent L total shoulder replacement in April.  He is slowly recovering from that.  He is still undergoing PT for that.  The soreness and pain has resolved.          He is on cetirizine, montelukast and azelastine for allergies.  He went for allergy testing but they told him he didn't have any allergies.  He was given a nasal spray with ipratropium.      ROS:     CONSTITUTIONAL:  Negative for fatigue, unintentional weight gain and unintentional weight loss.      EYES:  Positive for use of glasses.   Negative for blurred vision.      E/N/T:  Negative for diminished hearing and nasal congestion.      CARDIOVASCULAR:  Negative for chest pain and palpitations.      RESPIRATORY:  Negative for recent cough and dyspnea.      GASTROINTESTINAL:  Negative for abdominal pain, constipation, diarrhea, nausea and vomiting.      MUSCULOSKELETAL:  Positive for arthralgias (L shoulder).   Negative for back pain, joint stiffness or myalgias.      NEUROLOGICAL:  Negative for  paresthesias, vertigo and weakness.      PSYCHIATRIC:  Negative for anxiety, depression and sleep disturbance.          PMH/FMH/SH:     Last Reviewed on 6/11/2018 11:14 AM by Alexandra Barnes    Past Medical History:             PAST MEDICAL HISTORY         Coronary Artery Disease    Hyperlipidemia    Hypertension    LV dysfunction     Gastroesophageal Reflux Disease     Allergies         CURRENT MEDICAL PROVIDERS:    Cardiologist: Dr. Rizo    Orthopedist: Dr. Stroud         PREVENTIVE HEALTH MAINTENANCE             COLORECTAL CANCER SCREENING: Up to date (colonoscopy q10y; sigmoidoscopy q5y; Cologuard q3y) was last done 1/5/2018, Results are in chart; colonoscopy with the following abnormalities noted-- tubular adenoma X2; The next colonoscopy is due  3 years     PSA: was last done 11/2/17 with normal results         PAST MEDICAL HISTORY                 ADVANCED DIRECTIVES: None         Surgical History:         Appendectomy    Coronary Artery Bypass Graft: 5-V 5/2000;     Cholecystectomy    Joint Replacement: L shoulder 4/2018;      bilateral hand surgery;         Family History:     Father: Leukemia         Social History:     Occupation:    Retired     Marital Status:      Children: 3 children         Tobacco/Alcohol/Supplements:     Last Reviewed on 6/11/2018 11:14 AM by Alexandra Barnes    Tobacco: He has a past history of cigarette smoking; quit date:  1972.          Substance Abuse History:     Last Reviewed on 6/11/2018 11:14 AM by Alexandra Barnes        Mental Health History:     Last Reviewed on 6/11/2018 11:14 AM by Alexandra Barnes        Communicable Diseases (eg STDs):     Last Reviewed on 6/11/2018 11:14 AM by Alexandra Barnes            Current Problems:     Last Reviewed on 11/02/2017 12:08 PM by Alexandra Barnes    Degenerative arthritis of knee     Mixed hyperlipidemia     Hypertension     CAD     Allergies     Gastritis, NEC         Immunizations:     Prevnar 13 (Pneumococcal PCV 13)  11/2/2017     Fluzone High-Dose pf (>=65 yr) 12/9/2015     Fluzone High-Dose pf (>=65 yr) 12/2/2016     Fluzone High-Dose pf (>=65 yr) 11/2/2017     PNEUMOVAX 23 (Pneumococcal PPV23) 6/1/2015     Tdap (Tetanus, reduced diph, acellular pertussis) 2/1/2013     Zostavax (Zoster) 8/1/2010         Allergies:     Last Reviewed on 6/11/2018 11:03 AM by Cristal Colin      No Known Drug Allergies.         Current Medications:     Last Reviewed on 6/11/2018 11:07 AM by Cristal Colin    Crestor 40mg Tablet 1/2 tab daily     Meloxicam 15mg Tablet 1/2 to 1 prn pain to be taken with food     Ranitidine 300mg Tablet One tablet by mouth daily     Montelukast Sodium 10mg Tablet 1 TAB DAILY     Lotemax 0.5% Ophthalmic Gel 1 DROP IN RIGHT EYE BID     Aspirin (ASA) 81mg Tablets, Enteric Coated 1 tab daily     Cetirizine HCl 10mg Tablet 1 tab daily     cinnamon 2000mg 1 po daily     Fish Oil 1,200mg Softgel capsule Take 1 capsule(s) by mouth daily     flaxseed oil 1200 mg 1 po daily     Niacin (Nicotinic Acid) 500mg Tablet Take 2 tablet(s) by mouth daily     Vitamin D3 1,000IU Tablet 2 tab daily     Alrex 0.2% Ophthalmic Suspension INSTILL 1 DROP BOTH EYES BID     Doxycycline  Hyclate 100mg Capsules Take 1 capsule(s) by mouth bid     Ipratropium Bromide 0.06% Nasal Spray     Metoprolol Succinate 25mg Tablets, Extended Release Take 1/2 tab by mouth  daily         OBJECTIVE:        Vitals:         Current: 6/11/2018 11:03:13 AM    Ht:  5 ft, 8.25 in;  Wt: 186 lbs;  BMI: 28.1    T: 98.4 F (oral);  BP: 166/84 mm Hg (right arm, sitting);  P: 52 bpm (right arm (BP Cuff), sitting);  sCr: 0.92 mg/dL;  GFR: 64.42        Repeat:     11:36:57 AM     BP:   158/82mm Hg (right arm, sitting)         Exams:     PHYSICAL EXAM:     GENERAL: Vitals recorded well developed, well nourished;  well groomed;  no apparent distress;     EYES: extraocular movements intact; conjunctiva and cornea are normal; PERRLA;     E/N/T: OROPHARYNX:  normal  mucosa, dentition, gingiva, and posterior pharynx;     RESPIRATORY: normal respiratory rate and pattern with no distress; normal breath sounds with no rales, rhonchi, wheezes or rubs;     CARDIOVASCULAR: normal rate; rhythm is regular;  no systolic murmur; no edema;     GASTROINTESTINAL: nontender; normal bowel sounds;     MUSCULOSKELETAL: normal gait; normal overall tone     NEUROLOGIC: mental status: alert and oriented x 3; reflexes: brachioradialis: 2+; knee jerks: 2+;     PSYCHIATRIC: appropriate affect and demeanor; normal psychomotor function; normal speech pattern;         ASSESSMENT           401.1   I10  Hypertension              DDx:     272.2   E78.2  Mixed hyperlipidemia              DDx:     414.01   I25.10  CAD              DDx:     535.40   K29.60  Gastritis, NEC              DDx:     715.16   M17.0  Degenerative arthritis of knee              DDx:     477.0   J30.9  Allergies              DDx:         ORDERS:         Meds Prescribed:       Refill of: Crestor (Rosuvastatin) 20mg Tablet 1 tab PO qhs  #90 (Ninety) tablet(s) Refills: 1       Refill of: Meloxicam 15mg Tablet 1 tab PRN daily  #90 (Ninety) tablet(s) Refills: 1       Refill of: Ranitidine 300mg Tablet One tablet by mouth daily  #90 (Ninety) tablet(s) Refills: 1       Refill of: Montelukast Sodium 10mg Tablet 1 TAB DAILY  #90 (Ninety) tablet(s) Refills: 3       Refill of: Metoprolol Succinate 25mg Tablets, Extended Release Take 1/2 tab by mouth  daily  #45 (Forty Five) tablet(s) Refills: 1         Lab Orders:       [id number]  HTN - [facility] CMP AND LIPID: [id number]  (Send-Out)         APPTO  Appointment need  (In-House)                   PLAN:          Hypertension BP has been consistently running in a normal range at home.   Refills sent.  Checking labs.  RTC 6 months for AWV.     LABORATORY:  Labs ordered to be performed today include HTN/Lipid Panel: CMP, Lipid.      FOLLOW-UP: Schedule a follow-up visit in 6 months..  Medicare wellness 30 min  with Cameron           Prescriptions:       Refill of: Metoprolol Succinate 25mg Tablets, Extended Release Take 1/2 tab by mouth  daily  #45 (Forty Five) tablet(s) Refills: 1           Orders:       [id number]  HTNLP - [facility] CMP AND LIPID: [id number]  (Send-Out)         APPTO  Appointment need  (In-House)             Patient Education Handouts:       [facility] [place] Medication Compliance           Mixed hyperlipidemia Stable.  Checking labs.  Refills sent.           Prescriptions:       Refill of: Crestor (Rosuvastatin) 20mg Tablet 1 tab PO qhs  #90 (Ninety) tablet(s) Refills: 1          CAD Follows with Dr. Luevano.          Gastritis, NEC Stable.  Refills sent.           Prescriptions:       Refill of: Ranitidine 300mg Tablet One tablet by mouth daily  #90 (Ninety) tablet(s) Refills: 1          Degenerative arthritis of knee Refills sent.            Prescriptions:       Refill of: Meloxicam 15mg Tablet 1 tab PRN daily  #90 (Ninety) tablet(s) Refills: 1          Allergies Refills sent.  Recommended he start the ipratropium nasal spray daily for 2 weeks.           Prescriptions:       Refill of: Montelukast Sodium 10mg Tablet 1 TAB DAILY  #90 (Ninety) tablet(s) Refills: 3             Patient Recommendations:        For  Hypertension:     Schedule a follow-up visit in 6 months.                APPOINTMENT INFORMATION:        Monday Tuesday Wednesday Thursday Friday Saturday Sunday            Time:___________________AM  PM   Date:_____________________             CHARGE CAPTURE           **Please note: ICD descriptions below are intended for billing purposes only and may not represent clinical diagnoses**        Primary Diagnosis:         401.1 Hypertension            I10    Essential (primary) hypertension              Orders:          [id number]   Office/outpatient visit; established patient, level 4  (In-House)             APPTO   Appointment need  (In-House)           272.2 Mixed hyperlipidemia            E78.2    Mixed  hyperlipidemia    414.01 CAD            I25.10    Atherosclerotic heart disease of native coronary artery without angina pectoris    535.40 Gastritis, NEC            K29.60    Other gastritis without bleeding    715.16 Degenerative arthritis of knee            M17.0    Bilateral primary osteoarthritis of knee    477.0 Allergies            J30.9    Allergic rhinitis, unspecified

## 2021-05-18 NOTE — PROGRESS NOTES
"Mukesh Beard  1940     Office/Outpatient Visit    Visit Date: Tue, Jun 30, 2020 10:15 am    Provider: Alexandra Barnes MD (Assistant: Adela Degroot RN)    Location: [facility]        Electronically signed by Alexandra Barnes MD on  06/30/2020 12:26:23 PM                             Subjective:        CC: Kiran is a 79 year old White male.  6 month follow up         HPI: Kiran is here today for routine f/u on chronic issues.        He is on metoprolol succinate for HTN.  BP has been well controlled.  He does check this routinely at home.  No CP, palpitations, SOB.  H/o CAD, s/p 5 v. CABG.  He follows with Dr. Whiting (Cards).  He is on rosuvastatin for HLD and CAD.  No myalgias or other adverse effects.        He is on famotidine for GERD.  No heartburn or epigastric pain.        He is on meloxicam for chronic shoulder and back pain due to arthritis.        He is on cetirizine and azelastine for allergies.  He did have allergy testing done by [facility] and that was negative.        He is on carbamazepine for trigeminal neuralgia, L face.  Sx have been well controlled since starting this.        He has been having a lot of trouble with his eyes recently.  He has been on treatment for increased IOP, although it is not yet glaucoma.  They are \"hazy, cloudy\" in the morning first thing.  The drops for increased IOP do help with this.  He follows with Dr. Oneyda Mitchell for that, and goes to see her on Thurs of this week.        No falls in the past year.    ROS:     CONSTITUTIONAL:  Negative for fatigue, unintentional weight gain and unintentional weight loss.      EYES:  Positive for use of glasses.   Negative for blurred vision.      E/N/T:  Positive for nasal congestion and frequent rhinorrhea.   Negative for diminished hearing.      CARDIOVASCULAR:  Negative for chest pain and palpitations.      RESPIRATORY:  Negative for recent cough and dyspnea.      GASTROINTESTINAL:  Negative for abdominal " pain, constipation, diarrhea, nausea and vomiting.      MUSCULOSKELETAL:  Positive for arthralgias and back pain.   Negative for myalgias.      NEUROLOGICAL:  Negative for paresthesias and weakness.          Past Medical History / Family History / Social History:         Last Reviewed on 6/30/2020 10:22 AM by Alexandra Barnes    Past Medical History:             PAST MEDICAL HISTORY         Coronary Artery Disease    Hyperlipidemia    Hypertension    LV dysfunction     Gastroesophageal Reflux Disease     Allergies         CURRENT MEDICAL PROVIDERS:    Cardiologist: Dr. Rizo    Dermatologist: Dr. Land/Nelly Rosado    Orthopedist: Dr. Archer    Optometrist - Dr. Oneyda Mitchell         PREVENTIVE HEALTH MAINTENANCE             COLORECTAL CANCER SCREENING: Up to date (colonoscopy q10y; sigmoidoscopy q5y; Cologuard q3y) was last done 1/5/2018, Results are in chart; colonoscopy with the following abnormalities noted-- tubular adenoma X2; The next colonoscopy is due  3 years     PSA: was last done 11/2/17 with normal results         PAST MEDICAL HISTORY                 ADVANCED DIRECTIVES: None         Surgical History:         Appendectomy    Coronary Artery Bypass Graft: 5-V 5/2000;     Cholecystectomy    Joint Replacement: L shoulder 4/2018;     bilateral hand surgery;         Family History:     Father: Leukemia         Social History:     Occupation:    Retired     Marital Status:      Children: 3 children         Tobacco/Alcohol/Supplements:     Last Reviewed on 6/30/2020 10:22 AM by Alexandra Barnes    Tobacco: He has a past history of cigarette smoking; quit date:  1972.          Substance Abuse History:     Last Reviewed on 6/30/2020 10:22 AM by Alexandra Barnes        Mental Health History:     Last Reviewed on 6/30/2020 10:22 AM by Alexandra Barnes        Communicable Diseases (eg STDs):     Last Reviewed on 6/30/2020 10:22 AM by Alexandra Barnes        Current Problems:     Last Reviewed on  12/30/2019 09:33 AM by Alexandra Barnes    HTN - Essential (primary) hypertension    CAD - Atherosclerotic heart disease of native coronary artery without angina pectoris    Allergic rhinitis, unspecified    Other gastritis without bleeding    Bilateral primary osteoarthritis of knee    HLD - Mixed hyperlipidemia    Neoplasm of uncertain behavior of skin    Rash and other nonspecific skin eruption    Trigeminal neuralgia        Immunizations:     Influenza, high dose seasonal 11/25/2019    Prevnar 13 (Pneumococcal PCV 13) 11/2/2017    Fluzone High-Dose pf (>=65 yr) 12/9/2015    Fluzone High-Dose pf (>=65 yr) 12/2/2016    Fluzone High-Dose pf (>=65 yr) 11/2/2017    Fluzone High-Dose pf (>=65 yr) 10/12/2018    PNEUMOVAX 23 (Pneumococcal PPV23) 6/1/2015    Tdap (Tetanus, reduced diph, acellular pertussis) 2/1/2013    Zostavax (Zoster live) 8/1/2010        Allergies:     Last Reviewed on 12/30/2019 09:33 AM by Alexandra Barnes    No Known Allergies.        Current Medications:     Last Reviewed on 6/30/2020 10:15 AM by Adela Degroot    aspirin 81 mg oral tablet, delayed release (enteric coated) [1 tab daily]    niacin 500 mg oral tablet [Take 2 tablet(s) by mouth daily]    Vitamin D3 25 mcg (1,000 unit) oral tablet [2 tab daily]    flaxseed oil 1200 mg 1 po daily     Fish Oil 360-1,200 mg oral capsule [Take 1 capsule(s) by mouth daily]    cinnamon 2000mg 1 po daily     rosuvastatin 20 mg oral tablet [TAKE 1 TABLET AT BEDTIME]    meloxicam 15 mg oral tablet [TAKE 1 TABLET DAILY AS     NEEDED]    Metoprolol Succinate 25 mg oral Tablet, Extended Release 24 hr [Take 1/2 tab by mouth  daily]    Ipratropium Bromide 42 mcg (0.06 %) Intranasal Spray, Non-Aerosol    Alrex 0.2 % ophthalmic (eye) Drops, Suspension [INSTILL 1 DROP BOTH EYES BID ]    carBAMazepine 100 mg oral Tablet, Extended Release 12 hr [TAKE ONE TABLET BY MOUTH TWICE A DAY]    COMBIGAN     SOL 0.2/0.5%  [one gtt BID both eyes]    azelastine 137 mcg (0.1 %)  Intranasal Aerosol, Spray [spray 1 sprays in each nostril by intranasal route 1 time per day]    lisinopriL 10 mg oral tablet [TAKE ONE TABLET BY MOUTH DAILY]    DOXYCYC MONO TAB 50MG  [once daily]        Objective:        Vitals:         Current: 6/30/2020 10:19:00 AM    Ht:  5 ft, 8.25 in;  Wt: 183.4 lbs;  BMI: 27.7T: 97.1 F (oral);  BP: 181/84 mm Hg (left arm, sitting);  P: 49 bpm (left arm (BP Cuff), sitting);  sCr: 0.93 mg/dL;  GFR: 61.39        Repeat:     10:46:26 AM  BP:   198/78mm Hg (left arm, sitting, HR: 52) 10:47:32 AM  BP:   195/85mm Hg (right arm, sitting, HR: 51)     Exams:     PHYSICAL EXAM:     GENERAL: Vitals recorded well developed, well nourished;  well groomed;  no apparent distress;     EYES: extraocular movements intact; conjunctiva and cornea are normal; PERRLA;     E/N/T: OROPHARYNX:  normal mucosa, dentition, gingiva, and posterior pharynx;     RESPIRATORY: normal respiratory rate and pattern with no distress; normal breath sounds with no rales, rhonchi, wheezes or rubs;     CARDIOVASCULAR: normal rate; rhythm is regular;  no systolic murmur; no edema;     GASTROINTESTINAL: nontender; normal bowel sounds;     MUSCULOSKELETAL: normal gait; normal overall tone         Assessment:         I10   HTN - Essential (primary) hypertension       E78.2   HLD - Mixed hyperlipidemia       I25.10   CAD - Atherosclerotic heart disease of native coronary artery without angina pectoris       G50.0   Trigeminal neuralgia       M17.0   Bilateral primary osteoarthritis of knee       K29.60   Other gastritis without bleeding       J30.9   Allergic rhinitis, unspecified           ORDERS:         Meds Prescribed:       [Refilled] azelastine 137 mcg (0.1 %) Intranasal Aerosol, Spray [spray 1 sprays in each nostril by intranasal route 1 time per day], #90 (ninety) milliliters, Refills: 3 (three)       [Refilled] carBAMazepine 100 mg oral Tablet, Extended Release 12 hr [TAKE ONE TABLET BY MOUTH TWICE A DAY], #180  (one hundred and eighty) tablets, Refills: 2 (two)       [Refilled] Metoprolol Succinate 25 mg oral Tablet, Extended Release 24 hr [Take 1/2 tab by mouth  daily], #90 (ninety) tablets, Refills: 2 (two)       [Refilled] rosuvastatin 20 mg oral tablet [TAKE 1 TABLET AT BEDTIME], #90 (ninety) tablets, Refills: 2 (two)       [Refilled] meloxicam 15 mg oral tablet [TAKE 1 TABLET DAILY AS     NEEDED], #90 (ninety) tablets, Refills: 2 (two)         Radiology/Test Orders:       3017F  Colorectal CA screen results documented and reviewed (PV)  (In-House)              Lab Orders:       [id number]  HTN - [facility] CMP AND LIPID: [id number], [id number]  (Send-Out)            APPTO  Appointment need  (In-House)              Other Orders:       [id number]  Advance Care Planning discussed and doc in MR; no surrogate named or advance care plan provided  (Send-Out)              Depression screen negative  (In-House)            1101F  Pt screen for fall risk; document no falls in past year or only 1 fall w/o injury in past year (DIMITRI)  (In-House)                      Plan:         HTN - Essential (primary) hypertensionBP pretty high today.  Follows with Cardiology.  Checking labs.  Refills sent.  RTC 8 months for AWV.    LABORATORY:  Labs ordered to be performed today include HTN/Lipid Panel: CMP, Lipid.  [facility] PHQ-9 Depression Screening: Completed form scanned and in chart; Total Score 0; Negative Depression Screen     FOLLOW-UP: Schedule a follow-up visit in 8 months.:.  Medicare wellness 30 min with Cameron          Prescriptions:       [Refilled] Metoprolol Succinate 25 mg oral Tablet, Extended Release 24 hr [Take 1/2 tab by mouth  daily], #90 (ninety) tablets, Refills: 2 (two)           Orders:       [id number]  HTNLP - [facility] CMP AND LIPID: [id number], [id number]  (Send-Out)            3017F  Colorectal CA screen results documented and reviewed (PV)  (In-House)            1124F  Advance Care Planning discussed and doc in MR; no surrogate named or advance care plan  provided  (Send-Out)            APPTO  Appointment need  (In-House)              Depression screen negative  (In-House)            1101F  Pt screen for fall risk; document no falls in past year or only 1 fall w/o injury in past year (DIMITRI)  (In-House)              HLD - Mixed hyperlipidemiaStable.  Follows with Cardiology.  Checking labs.  Refills sent.          Prescriptions:       [Refilled] rosuvastatin 20 mg oral tablet [TAKE 1 TABLET AT BEDTIME], #90 (ninety) tablets, Refills: 2 (two)         CAD - Atherosclerotic heart disease of native coronary artery without angina pectorisStable.  Follows with Cardiology.  Checking labs.  Refills sent as above.        Trigeminal neuralgiaStable.  Refills sent.          Prescriptions:       [Refilled] carBAMazepine 100 mg oral Tablet, Extended Release 12 hr [TAKE ONE TABLET BY MOUTH TWICE A DAY], #180 (one hundred and eighty) tablets, Refills: 2 (two)         Bilateral primary osteoarthritis of kneeStable.  Refills sent.          Prescriptions:       [Refilled] meloxicam 15 mg oral tablet [TAKE 1 TABLET DAILY AS     NEEDED], #90 (ninety) tablets, Refills: 2 (two)         Other gastritis without bleedingStable.  Uses OTC famotidine.        Allergic rhinitis, unspecifiedStable.  Refills sent.          Prescriptions:       [Refilled] azelastine 137 mcg (0.1 %) Intranasal Aerosol, Spray [spray 1 sprays in each nostril by intranasal route 1 time per day], #90 (ninety) milliliters, Refills: 3 (three)             Patient Recommendations:        For  HTN - Essential (primary) hypertension:    Schedule a follow-up visit in 8 months.                APPOINTMENT INFORMATION:        Monday Tuesday Wednesday Thursday Friday Saturday Sunday            Time:___________________AM  PM   Date:_____________________             Charge Capture:         Primary Diagnosis:     I10  HTN - Essential (primary) hypertension           Orders:      [id number]  Office/outpatient visit; established  patient, level 4  (In-House)            3017F  Colorectal CA screen results documented and reviewed (PV)  (In-House)            APPTO  Appointment need  (In-House)              Depression screen negative  (In-House)            1101F  Pt screen for fall risk; document no falls in past year or only 1 fall w/o injury in past year (DIMITRI)  (In-House)              E78.2  HLD - Mixed hyperlipidemia     I25.10  CAD - Atherosclerotic heart disease of native coronary artery without angina pectoris     G50.0  Trigeminal neuralgia     M17.0  Bilateral primary osteoarthritis of knee     K29.60  Other gastritis without bleeding     J30.9  Allergic rhinitis, unspecified

## 2021-05-18 NOTE — PROGRESS NOTES
Mukesh Beard  1940     Office/Outpatient Visit    Visit Date: Fri, Apr 23, 2021 10:36 am    Provider: Alexandra Barnes MD (Assistant: Nena Randhawa MA)    Location: [facility]        Electronically signed by Alexandra Barnes MD on  04/23/2021 11:28:25 AM                             Subjective:        CC: Kiran is a 80 year old White male.  Patient presents today for [facility] visit         HPI:       He is due for colonoscopy, last done 1/2018 and this showed tubular adenoma x2.  Three year repeat recommended; he has gotten a letter from Dr. Foote about scheduling and will get that taken care of once he gets a better handle on his eyes.  Prostate cancer screening is no longer indicated by age.  He is UTD on Pneumovax (6/2015), Prevnar (11/2014), Zostavax (8/2010), Tdap (2/2013), and flu (12/2020).  He has had both his COVID vaccines!  He is due for routine labs including HTN panel.    Kiran is here for a Medicare wellness visit.  The required HRA questions are integrated within this visit note. Family medical history and individual medical/surgical history were reviewed and updated.  A current height, weight, BMI, blood pressure, and pulse were recorded in the vitals section of the note and have been reviewed. Patient's medications, including supplements, were recorded in the chart and reviewed.  Current providers and suppliers were reviewed and updated.          Self-Assessment of Health: He rates his health as good. He rates his confidence of being able to control/manage most of his health problems as very confident. His physical/emotional health has limited his social activites slightly.  A review of cognitive impairment was performed, including ability to drive a car, manage finances, and any memory changes, and was found to be negative.  A review of functional ability, including bathing, dressing, walking, and urine/bowel continence as well as level of safety was performed and was found to  be negative.  Falls Risk: Has not had any falls or only one fall without injury in the past year.  He denies having trouble hearing the TV/radio when others do not, having to strain to hear or understand conversations and wearing hearing aid(s).  Concerning home safety, he reports that at home he DOES have adequate lighting, a skid resistant shower/tub, grab bars in the bath, handrails on stairs, functioning smoke alarms and absence of throw rugs.          Immunization Status: has not had Shingrix; Physical Activity: He exercises for at least 20 minutes 3 or more days/week.; Type of diet patient normally eats is described as well-balanced with fruits and vegetables Tobacco: He has a past history of cigarette smoking; quit date:  1972.  Preventative Health updated today.      ROS:     CONSTITUTIONAL:  Negative for fatigue, unintentional weight gain and unintentional weight loss.      EYES:  Positive for blurred vision ( bilaterally ), use of glasses and glaucoma (follows with Mo[facility]).      E/N/T:  Positive for nasal congestion and frequent rhinorrhea.   Negative for diminished hearing.      CARDIOVASCULAR:  Negative for chest pain and palpitations.      RESPIRATORY:  Negative for recent cough and dyspnea.      GASTROINTESTINAL:  Negative for abdominal pain, constipation, diarrhea, nausea and vomiting.      MUSCULOSKELETAL:  Positive for arthralgias and back pain.   Negative for myalgias.      NEUROLOGICAL:  Negative for paresthesias and weakness.          Past Medical History / Family History / Social History:         Last Reviewed on 6/30/2020 10:22 AM by Alexandra Barnes    Past Medical History:             PAST MEDICAL HISTORY         Coronary Artery Disease    Hyperlipidemia    Hypertension    LV dysfunction     Gastroesophageal Reflux Disease     Allergies         CURRENT MEDICAL PROVIDERS:    Cardiologist: Dr. Rizo    Dermatologist: Dr. Land/Nelly Rosado    Orthopedist: Dr. Archer     Optometrist - Dr. Oneyda Mitchell         PREVENTIVE HEALTH MAINTENANCE             COLORECTAL CANCER SCREENING: Up to date (colonoscopy q10y; sigmoidoscopy q5y; Cologuard q3y) was last done 1/5/2018, Results are in chart; colonoscopy with the following abnormalities noted-- tubular adenoma X2; The next colonoscopy is due  3 years     PSA: was last done 11/2/17 with normal results         PAST MEDICAL HISTORY                 ADVANCED DIRECTIVES: None         Surgical History:         Appendectomy    Coronary Artery Bypass Graft: 5-V 5/2000;     Cholecystectomy    Joint Replacement: L shoulder 4/2018;     bilateral hand surgery;         Family History:     Father: Leukemia         Social History:     Occupation:    Retired     Marital Status:      Children: 3 children         Tobacco/Alcohol/Supplements:     Last Reviewed on 6/30/2020 10:22 AM by Alexandra Barnes    Tobacco: He has a past history of cigarette smoking; quit date:  1972.          Substance Abuse History:     Last Reviewed on 6/30/2020 10:22 AM by Alexandra Barnes        Mental Health History:     Last Reviewed on 6/30/2020 10:22 AM by Alexandra Barnes        Communicable Diseases (eg STDs):     Last Reviewed on 6/30/2020 10:22 AM by Alexandra Barnes        Current Problems:     Last Reviewed on 6/30/2020 10:22 AM by Alexandra Barnes    HTN - Essential (primary) hypertension    CAD - Atherosclerotic heart disease of native coronary artery without angina pectoris    Allergic rhinitis, unspecified    Other gastritis without bleeding    Bilateral primary osteoarthritis of knee    HLD - Mixed hyperlipidemia    Rash and other nonspecific skin eruption    Neoplasm of uncertain behavior of skin    Trigeminal neuralgia        Immunizations:     Influenza, high dose seasonal 11/25/2019    Prevnar 13 (Pneumococcal PCV 13) 11/2/2017    Fluzone High-Dose pf (>=65 yr) 12/9/2015    Fluzone High-Dose pf (>=65 yr) 12/2/2016    Fluzone High-Dose pf (>=65 yr) 11/2/2017     Fluzone High-Dose pf (>=65 yr) 10/12/2018    PNEUMOVAX 23 (Pneumococcal PPV23) 6/1/2015    Tdap (Tetanus, reduced diph, acellular pertussis) 2/1/2013    Zostavax (Zoster live) 8/1/2010        Allergies:     Last Reviewed on 4/23/2021 10:40 AM by Nena Randhawa    No Known Allergies.        Current Medications:     Last Reviewed on 4/23/2021 10:41 AM by Nena Randhawa    aspirin 81 mg oral tablet, delayed release (enteric coated) [1 tab daily]    niacin 500 mg oral tablet [Take 2 tablet(s) by mouth daily]    Vitamin D3 25 mcg (1,000 unit) oral tablet [2 tab daily]    flaxseed oil 1200 mg 1 po daily     Fish Oil 360-1,200 mg oral capsule [Take 1 capsule(s) by mouth daily]    cinnamon 2000mg 1 po daily     rosuvastatin 20 mg oral tablet [TAKE 1 TABLET AT BEDTIME]    meloxicam 15 mg oral tablet [TAKE 1 TABLET DAILY AS     NEEDED]    Metoprolol Succinate 25 mg oral Tablet, Extended Release 24 hr [Take 1/2 tab by mouth  daily]    Ipratropium Bromide 42 mcg (0.06 %) Intranasal Spray, Non-Aerosol    carBAMazepine 100 mg oral Tablet, Extended Release 12 hr [TAKE 1 TABLET TWICE A DAY]    lisinopriL 10 mg oral tablet [TAKE ONE TABLET BY MOUTH DAILY]    DOXYCYC MONO TAB 50MG  [once daily]    COSOPT PF    SOL 2%-0.5%        Objective:        Vitals:         Current: 4/23/2021 10:43:03 AM    Ht:  5 ft, 8.25 in;  Wt: 183 lbs;  BMI: 27.6T: 97.7 F (temporal);  BP: 188/83 mm Hg (left arm, sitting);  P: 54 bpm (left arm (BP Cuff), sitting);  sCr: 0.87 mg/dL;  GFR: 64.51        Repeat:     11:24:45 AM  BP:   197/88mm Hg (left arm, sitting, HR: 62)     Exams:     PHYSICAL EXAM:     GENERAL: Vitals recorded well developed, well nourished;  well groomed;  no apparent distress;     EYES: extraocular movements intact; conjunctiva and cornea are normal; PERRLA;     E/N/T: OROPHARYNX:  normal mucosa, dentition, gingiva, and posterior pharynx;     NECK: carotid exam reveals bilateral bruits;     RESPIRATORY: normal respiratory rate and  pattern with no distress; normal breath sounds with no rales, rhonchi, wheezes or rubs;     CARDIOVASCULAR: normal rate; rhythm is regular;  no systolic murmur; no edema;     GASTROINTESTINAL: nontender; normal bowel sounds;     MUSCULOSKELETAL: normal gait; normal overall tone     NEUROLOGIC: mental status: alert and oriented x 3; reflexes: brachioradialis: 2+; knee jerks: 2+;     PSYCHIATRIC: appropriate affect and demeanor; normal psychomotor function; normal speech pattern;         Assessment:         Z00.00   Encounter for general adult medical examination without abnormal findings       I10   HTN - Essential (primary) hypertension       E78.2   HLD - Mixed hyperlipidemia       I25.10   CAD - Atherosclerotic heart disease of native coronary artery without angina pectoris           ORDERS:         Meds Prescribed:       [Refilled] Metoprolol Succinate 25 mg oral Tablet, Extended Release 24 hr [Take 1/2 tab by mouth  daily], #90 (ninety) tablets, Refills: 2 (two)       [Refilled] rosuvastatin 20 mg oral tablet [TAKE 1 TABLET AT BEDTIME], #90 (ninety) tablets, Refills: 2 (two)       [Refilled] lisinopriL 10 mg oral tablet [TAKE ONE TABLET BY MOUTH DAILY], #90 (ninety) tablets, Refills: 2 (two)       [Refilled] meloxicam 15 mg oral tablet [TAKE 1 TABLET DAILY AS     NEEDED], #90 (ninety) tablets, Refills: 2 (two)         Radiology/Test Orders:       3017F  Colorectal CA screen results documented and reviewed (PV)  (In-House)              Lab Orders:       [id number]  HTN - [facility] CMP AND LIPID: [id number]  (Send-Out)            APPTO  Appointment need  (In-House)              Procedures Ordered:         Annual wellness visit, includes a PPPS, subsequent visit  (In-House)              Other Orders:       1101F  Pt screen for fall risk; document no falls in past year or only 1 fall w/o injury in past year (DIMITRI)  (In-House)            1124F  Advance Care Planning discussed and doc in MR; no surrogate named or advance  care plan provided  (Send-Out)              Depression screen negative  (In-House)                      Plan:         Encounter for general adult medical examination without abnormal findingsHe is due for colonoscopy, last done 1/2018 and this showed tubular adenoma x2.  Three year repeat recommended; he has gotten a letter from Dr. Foote about scheduling and will get that taken care of once he gets a better handle on his eyes.  Prostate cancer screening is no longer indicated by age.  He is UTD on Pneumovax (6/2015), Prevnar (11/2014), Zostavax (8/2010), Tdap (2/2013), and flu (12/2020).  He has had both his COVID vaccines!  He is due for routine labs including HTN panel; ordered.  No fall risk, no memory issues, no signs/symptoms of depression.  He lives with his wife.  He is able to drive and perform ADLs/manages finances independently.   Hearing is adequate.  He does not have a living will.  Preventive services handout and safety handout were given to him.  Current doctor list updated.  RTC 6 months.    MIPS PHQ-9 Depression Screening: Completed form scanned and in chart; Total Score 0; Negative Depression Screen ADVANCED DIRECTIVES: None         FOLLOW-UP: Schedule a follow-up visit in 6 months.:.  f/u HTN with [place]          Orders:         Annual wellness visit, includes a PPPS, subsequent visit  (In-House)            1101F  Pt screen for fall risk; document no falls in past year or only 1 fall w/o injury in past year (DIMITRI)  (In-House)            3017F  Colorectal CA screen results documented and reviewed (PV)  (In-House)            1124F  Advance Care Planning discussed and doc in MR; no surrogate named or advance care plan provided  (Send-Out)            APPTO  Appointment need  (In-House)              Depression screen negative  (In-House)              HTN - Essential (primary) hypertensionBP is really quite high today.  Kiran states that this is an anomaly for him, as he has been regularly  going to the ophthalmologist, allergist, cardiologist and BP has not been routinely high on those visits.  He also checks BP periodically at home and reports it has been running at goal those checks too.  Will have him check BP once he gets home and call back later today to let me know if it is coming down or not.  Monitor closely.  Will increase antihypertensive if we do not see some improvement when he calls back.    LABORATORY:  Labs ordered to be performed today include HTN/Lipid Panel: CMP, Lipid.            Prescriptions:       [Refilled] Metoprolol Succinate 25 mg oral Tablet, Extended Release 24 hr [Take 1/2 tab by mouth  daily], #90 (ninety) tablets, Refills: 2 (two)       [Refilled] lisinopriL 10 mg oral tablet [TAKE ONE TABLET BY MOUTH DAILY], #90 (ninety) tablets, Refills: 2 (two)           Orders:       [id number]  HTNLP - [facility] CMP AND LIPID: [id number]  (Send-Out)              HLD - Mixed hyperlipidemiaChecking labs.          Prescriptions:       [Refilled] rosuvastatin 20 mg oral tablet [TAKE 1 TABLET AT BEDTIME], #90 (ninety) tablets, Refills: 2 (two)         CAD - Atherosclerotic heart disease of native coronary artery without angina pectorisFollowing with Cardiology.  Checking labs.  No refills needed.  He does have carotid bruits bilaterally, R>L, but these have been monitored with carotid Dopplers by his cardiologist and there is no significant stenosis present.            Other Prescriptions:       [Refilled] meloxicam 15 mg oral tablet [TAKE 1 TABLET DAILY AS     NEEDED], #90 (ninety) tablets, Refills: 2 (two)         Patient Recommendations:        For  Encounter for general adult medical examination without abnormal findings:    Schedule a follow-up visit in 6 months.                APPOINTMENT INFORMATION:        Monday Tuesday Wednesday Thursday Friday Saturday Sunday            Time:___________________AM  PM   Date:_____________________             Charge Capture:         Primary  Diagnosis:     Z00.00  Encounter for general adult medical examination without abnormal findings           Orders:        Annual wellness visit, includes a PPPS, subsequent visit  (In-House)            1101F  Pt screen for fall risk; document no falls in past year or only 1 fall w/o injury in past year (DIMITRI)  (In-House)            3017F  Colorectal CA screen results documented and reviewed (PV)  (In-House)            APPTO  Appointment need  (In-House)              Depression screen negative  (In-House)              I10  HTN - Essential (primary) hypertension     E78.2  HLD - Mixed hyperlipidemia     I25.10  CAD - Atherosclerotic heart disease of native coronary artery without angina pectoris

## 2021-06-29 RX ORDER — METOPROLOL SUCCINATE 25 MG/1
TABLET, EXTENDED RELEASE ORAL
Qty: 45 TABLET | Refills: 3 | Status: SHIPPED | OUTPATIENT
Start: 2021-06-29 | End: 2022-06-13

## 2021-06-29 RX ORDER — MELOXICAM 15 MG/1
TABLET ORAL
Qty: 90 TABLET | Refills: 1 | Status: SHIPPED | OUTPATIENT
Start: 2021-06-29 | End: 2022-03-01

## 2021-07-01 VITALS
TEMPERATURE: 98 F | WEIGHT: 183.2 LBS | HEART RATE: 52 BPM | HEIGHT: 68 IN | SYSTOLIC BLOOD PRESSURE: 168 MMHG | BODY MASS INDEX: 27.77 KG/M2 | DIASTOLIC BLOOD PRESSURE: 69 MMHG

## 2021-07-01 VITALS
WEIGHT: 186 LBS | SYSTOLIC BLOOD PRESSURE: 158 MMHG | TEMPERATURE: 98.4 F | HEART RATE: 52 BPM | HEIGHT: 68 IN | DIASTOLIC BLOOD PRESSURE: 82 MMHG | BODY MASS INDEX: 28.19 KG/M2

## 2021-07-01 VITALS
HEART RATE: 48 BPM | TEMPERATURE: 97.6 F | WEIGHT: 181 LBS | SYSTOLIC BLOOD PRESSURE: 158 MMHG | BODY MASS INDEX: 27.43 KG/M2 | HEIGHT: 68 IN | DIASTOLIC BLOOD PRESSURE: 72 MMHG

## 2021-07-01 VITALS
TEMPERATURE: 97.7 F | SYSTOLIC BLOOD PRESSURE: 184 MMHG | HEIGHT: 68 IN | BODY MASS INDEX: 26.4 KG/M2 | WEIGHT: 174.2 LBS | DIASTOLIC BLOOD PRESSURE: 85 MMHG | HEART RATE: 49 BPM

## 2021-07-02 VITALS
TEMPERATURE: 97.1 F | SYSTOLIC BLOOD PRESSURE: 195 MMHG | BODY MASS INDEX: 27.8 KG/M2 | WEIGHT: 183.4 LBS | DIASTOLIC BLOOD PRESSURE: 85 MMHG | HEART RATE: 49 BPM | HEIGHT: 68 IN

## 2021-07-02 VITALS
HEIGHT: 68 IN | HEART RATE: 54 BPM | DIASTOLIC BLOOD PRESSURE: 88 MMHG | WEIGHT: 183 LBS | TEMPERATURE: 97.7 F | SYSTOLIC BLOOD PRESSURE: 197 MMHG | BODY MASS INDEX: 27.74 KG/M2

## 2021-07-02 VITALS
WEIGHT: 182.6 LBS | TEMPERATURE: 97.6 F | HEART RATE: 48 BPM | BODY MASS INDEX: 27.68 KG/M2 | SYSTOLIC BLOOD PRESSURE: 152 MMHG | HEIGHT: 68 IN | DIASTOLIC BLOOD PRESSURE: 81 MMHG

## 2021-09-28 RX ORDER — CARBAMAZEPINE 100 MG/1
TABLET, EXTENDED RELEASE ORAL
Qty: 180 TABLET | Refills: 1 | Status: SHIPPED | OUTPATIENT
Start: 2021-09-28 | End: 2022-04-06 | Stop reason: SDUPTHER

## 2021-10-27 ENCOUNTER — LAB (OUTPATIENT)
Dept: LAB | Facility: HOSPITAL | Age: 81
End: 2021-10-27

## 2021-10-27 ENCOUNTER — OFFICE VISIT (OUTPATIENT)
Dept: FAMILY MEDICINE CLINIC | Age: 81
End: 2021-10-27

## 2021-10-27 ENCOUNTER — TELEPHONE (OUTPATIENT)
Dept: FAMILY MEDICINE CLINIC | Age: 81
End: 2021-10-27

## 2021-10-27 VITALS
SYSTOLIC BLOOD PRESSURE: 158 MMHG | BODY MASS INDEX: 27.71 KG/M2 | TEMPERATURE: 97.7 F | DIASTOLIC BLOOD PRESSURE: 72 MMHG | WEIGHT: 182.8 LBS | HEART RATE: 48 BPM | HEIGHT: 68 IN

## 2021-10-27 DIAGNOSIS — I10 ESSENTIAL HYPERTENSION: Primary | ICD-10-CM

## 2021-10-27 DIAGNOSIS — M54.50 CHRONIC BILATERAL LOW BACK PAIN WITHOUT SCIATICA: ICD-10-CM

## 2021-10-27 DIAGNOSIS — G89.29 CHRONIC BILATERAL LOW BACK PAIN WITHOUT SCIATICA: ICD-10-CM

## 2021-10-27 DIAGNOSIS — I25.10 CORONARY ARTERY DISEASE INVOLVING NATIVE CORONARY ARTERY OF NATIVE HEART WITHOUT ANGINA PECTORIS: ICD-10-CM

## 2021-10-27 DIAGNOSIS — K21.9 GASTROESOPHAGEAL REFLUX DISEASE WITHOUT ESOPHAGITIS: ICD-10-CM

## 2021-10-27 DIAGNOSIS — G50.0 TRIGEMINAL NEURALGIA OF LEFT SIDE OF FACE: ICD-10-CM

## 2021-10-27 DIAGNOSIS — I10 ESSENTIAL HYPERTENSION: ICD-10-CM

## 2021-10-27 PROBLEM — K29.30 CHRONIC SUPERFICIAL GASTRITIS WITHOUT BLEEDING: Status: ACTIVE | Noted: 2021-10-27

## 2021-10-27 PROBLEM — E78.2 MIXED HYPERLIPIDEMIA: Status: ACTIVE | Noted: 2021-10-27

## 2021-10-27 PROBLEM — M17.0 BILATERAL PRIMARY OSTEOARTHRITIS OF KNEE: Status: ACTIVE | Noted: 2021-10-27

## 2021-10-27 PROBLEM — K29.30 CHRONIC SUPERFICIAL GASTRITIS WITHOUT BLEEDING: Status: RESOLVED | Noted: 2021-10-27 | Resolved: 2021-10-27

## 2021-10-27 LAB
ALBUMIN SERPL-MCNC: 4.6 G/DL (ref 3.5–5.2)
ALBUMIN/GLOB SERPL: 2 G/DL
ALP SERPL-CCNC: 72 U/L (ref 39–117)
ALT SERPL W P-5'-P-CCNC: 20 U/L (ref 1–41)
ANION GAP SERPL CALCULATED.3IONS-SCNC: 8.8 MMOL/L (ref 5–15)
AST SERPL-CCNC: 21 U/L (ref 1–40)
BILIRUB SERPL-MCNC: 0.4 MG/DL (ref 0–1.2)
BUN SERPL-MCNC: 12 MG/DL (ref 8–23)
BUN/CREAT SERPL: 13.3 (ref 7–25)
CALCIUM SPEC-SCNC: 9.2 MG/DL (ref 8.6–10.5)
CHLORIDE SERPL-SCNC: 104 MMOL/L (ref 98–107)
CHOLEST SERPL-MCNC: 103 MG/DL (ref 0–200)
CO2 SERPL-SCNC: 24.2 MMOL/L (ref 22–29)
CREAT SERPL-MCNC: 0.9 MG/DL (ref 0.76–1.27)
GFR SERPL CREATININE-BSD FRML MDRD: 81 ML/MIN/1.73
GLOBULIN UR ELPH-MCNC: 2.3 GM/DL
GLUCOSE SERPL-MCNC: 122 MG/DL (ref 65–99)
HDLC SERPL-MCNC: 41 MG/DL (ref 40–60)
LDLC SERPL CALC-MCNC: 41 MG/DL (ref 0–100)
LDLC/HDLC SERPL: 0.96 {RATIO}
POTASSIUM SERPL-SCNC: 4 MMOL/L (ref 3.5–5.2)
PROT SERPL-MCNC: 6.9 G/DL (ref 6–8.5)
SODIUM SERPL-SCNC: 137 MMOL/L (ref 136–145)
TRIGL SERPL-MCNC: 114 MG/DL (ref 0–150)
VLDLC SERPL-MCNC: 21 MG/DL (ref 5–40)

## 2021-10-27 PROCEDURE — 36415 COLL VENOUS BLD VENIPUNCTURE: CPT

## 2021-10-27 PROCEDURE — 80053 COMPREHEN METABOLIC PANEL: CPT

## 2021-10-27 PROCEDURE — 80061 LIPID PANEL: CPT

## 2021-10-27 PROCEDURE — 99214 OFFICE O/P EST MOD 30 MIN: CPT | Performed by: FAMILY MEDICINE

## 2021-10-27 RX ORDER — LISINOPRIL 10 MG/1
10 TABLET ORAL DAILY
Qty: 90 TABLET | Refills: 1 | Status: SHIPPED | OUTPATIENT
Start: 2021-10-27 | End: 2021-10-28 | Stop reason: SDUPTHER

## 2021-10-27 RX ORDER — FAMOTIDINE 40 MG/1
40 TABLET, FILM COATED ORAL DAILY
Qty: 90 TABLET | Refills: 1 | Status: SHIPPED | OUTPATIENT
Start: 2021-10-27 | End: 2021-10-28 | Stop reason: SDUPTHER

## 2021-10-27 NOTE — ASSESSMENT & PLAN NOTE
Blood pressure is running pretty high today and is also slightly above goal even when he is checking at home.  I am going to increase his lisinopril from 5 mg to 10 mg.  He should continue metoprolol succinate 25 mg daily but I do not want to increase that since he is already pretty bradycardic.  He does follow with Cardiology but is in between cardiologist since his old physician left.  Checking labs today.  Return to clinic 6 months for Medicare wellness or sooner as needed.    Recommend high-dose flu shot, but we are out of those today, so I have recommended that he go to the pharmacy and try to get that.  He can get his Covid booster at the same time.  He had Moderna with second shot done back in February.

## 2021-10-27 NOTE — TELEPHONE ENCOUNTER
Caller: Mukesh Beard    Relationship: Self    Best call back number: [phone]      Who are you requesting to speak with (clinical staff, provider,  specific staff member):NO ONE       What was the call regarding PATIENT STATES THAT AT TODAY'S VISIT WITH PCP HE ACCIDENTALLY INFORMED HER THAT HE WAS HALVING lisinopril (PRINIVIL,ZESTRIL) 10 MG tablet BUT HE ISN'T. STATES THAT HE CONFUSED IT WITH ANOTHER MEDICATION. THEREFORE PATIENT IS REQUESTING   lisinopril (PRINIVIL,ZESTRIL) 10 MG tablet TO BE FILLED AT 20 MG TABLETS IN PLACE OF THE 10 MG. WANTS MEDICATION SENT TO [facility] - [place], [place] - [address] AT [address] [facility] - [phone] [facility] [phone] FX      Do you require a callback: NO UNLESS MORE CLARIFICATION IS NEEDED.

## 2021-10-27 NOTE — ASSESSMENT & PLAN NOTE
Not currently on an acid blocker.  He has been on famotidine in the past, so we will restart him there.  If that is not sufficient to control his symptoms, we will consider putting him back on a PPI.  He has had gastritis in the past.

## 2021-10-27 NOTE — PROGRESS NOTES
Chief Complaint  Hypertension (6 month f/u)    Subjective          Mukesh Beard presents to [facility] today for routine f/u of chronic issues.    He is on lisinopril 5 mg and metoprolol succinate for HTN.  His BP has been well controlled.  He does check this routinely at home and it runs 140-150s/70s.  He denies CP, palpitations, SOB.  H/o CAD, s/p 5 v. CABG.  Following with Dr. Whiting (Cardiology) but he has moved clinics, so Kiran is establishing with a new cardiologist in the practice.  He is on rosuvastatin for HLD and CAD.  He denies myalgias.      He is not currently on medication for GERD.  He has been on famotidine in the past.  Has been noticing increased indigestion lately.      He is on meloxicam for chronic shoulder and back pain due to OA.      He is on montelukast and cetirizine for allergies.      He is on carbamazepine for trigeminal neuralgia of the left face.  Sx have been well controlled since starting this.          Current Outpatient Medications:   •  aspirin 81 MG tablet, Take 1 tablet by mouth Every Night. Resume 4/14/18, Disp: , Rfl:   •  carBAMazepine XR (TEGretol  XR) 100 MG 12 hr tablet, TAKE 1 TABLET TWICE A DAY, Disp: 180 tablet, Rfl: 1  •  cetirizine (zyrTEC) 10 MG tablet, Take 10 mg by mouth Daily., Disp: , Rfl:   •  Cholecalciferol (VITAMIN D3) 2000 units capsule, Take 2,000 Units by mouth Every Night., Disp: , Rfl:   •  Cinnamon 500 MG capsule, Take 500 mg by mouth Every Morning. 2000mg qd, Disp: , Rfl:   •  doxycycline (VIBRAMYCIN) 100 MG capsule, Take 100 mg by mouth Every Morning., Disp: , Rfl:   •  Flaxseed, Linseed, (FLAX SEED OIL) 1000 MG capsule, Take 1,200 mg by mouth Every Morning., Disp: , Rfl:   •  lisinopril (PRINIVIL,ZESTRIL) 10 MG tablet, Take 1 tablet by mouth Daily., Disp: 90 tablet, Rfl: 1  •  meloxicam (MOBIC) 15 MG tablet, TAKE 1 TABLET DAILY AS     NEEDED, Disp: 90 tablet, Rfl: 1  •  metoprolol succinate XL (TOPROL-XL) 25 MG  "24 hr tablet, TAKE 1/2 TABLET DAILY, Disp: 45 tablet, Rfl: 3  •  montelukast (SINGULAIR) 10 MG tablet, Take 10 mg by mouth Every Night., Disp: , Rfl:   •  niacin 500 MG tablet, Take 1,000 mg by mouth Every Night., Disp: , Rfl:   •  Omega-3 Fatty Acids (FISH OIL) 1200 MG capsule capsule, Take 1,200 mg by mouth Daily With Breakfast., Disp: , Rfl:   •  rosuvastatin (CRESTOR) 20 MG tablet, Take 20 mg by mouth Every Night., Disp: , Rfl:   •  famotidine (PEPCID) 40 MG tablet, Take 1 tablet by mouth Daily., Disp: 90 tablet, Rfl: 1    Allergies:  Benzalkonium      Objective   Vital Signs:   BP (!) 181/72 (BP Location: Left arm, Patient Position: Sitting, Cuff Size: Large Adult)   Pulse 50   Temp 97.7 °F (36.5 °C) (Oral)   Ht 173.4 cm (68.27\")   Wt 82.9 kg (182 lb 12.8 oz)   BMI 27.58 kg/m²     Physical Exam  Vitals reviewed.   Constitutional:       General: He is not in acute distress.     Appearance: Normal appearance. He is well-developed.   HENT:      Head: Normocephalic and atraumatic.      Right Ear: External ear normal.      Left Ear: External ear normal.      Nose: Nose normal.      Mouth/Throat:      Mouth: Mucous membranes are moist.   Eyes:      Extraocular Movements: Extraocular movements intact.      Conjunctiva/sclera: Conjunctivae normal.      Pupils: Pupils are equal, round, and reactive to light.   Cardiovascular:      Rate and Rhythm: Normal rate and regular rhythm.      Heart sounds: No murmur heard.      Pulmonary:      Effort: Pulmonary effort is normal.      Breath sounds: Normal breath sounds. No wheezing, rhonchi or rales.   Abdominal:      General: Bowel sounds are normal. There is no distension.      Palpations: Abdomen is soft.      Tenderness: There is no abdominal tenderness.   Musculoskeletal:         General: Normal range of motion.   Neurological:      Mental Status: He is alert.   Psychiatric:         Mood and Affect: Affect normal.             Assessment and Plan    Diagnoses and all " orders for this visit:    1. Essential hypertension (Primary)  Assessment & Plan:  Blood pressure is running pretty high today and is also slightly above goal even when he is checking at home.  I am going to increase his lisinopril from 5 mg to 10 mg.  He should continue metoprolol succinate 25 mg daily but I do not want to increase that since he is already pretty bradycardic.  He does follow with Cardiology but is in between cardiologist since his old physician left.  Checking labs today.  Return to clinic 6 months for Medicare wellness or sooner as needed.    Recommend high-dose flu shot, but we are out of those today, so I have recommended that he go to the pharmacy and try to get that.  He can get his Covid booster at the same time.  He had Moderna with second shot done back in February.    Orders:  -     Comprehensive Metabolic Panel; Future  -     Lipid Panel; Future  -     lisinopril (PRINIVIL,ZESTRIL) 10 MG tablet; Take 1 tablet by mouth Daily.  Dispense: 90 tablet; Refill: 1    2. Coronary artery disease involving native coronary artery of native heart without angina pectoris  Assessment & Plan:  Stable.  No refills needed.  Checking labs.  Following with Cardiology.      3. Gastroesophageal reflux disease without esophagitis  Assessment & Plan:  Not currently on an acid blocker.  He has been on famotidine in the past, so we will restart him there.  If that is not sufficient to control his symptoms, we will consider putting him back on a PPI.  He has had gastritis in the past.    Orders:  -     famotidine (PEPCID) 40 MG tablet; Take 1 tablet by mouth Daily.  Dispense: 90 tablet; Refill: 1    4. Chronic bilateral low back pain without sciatica  Assessment & Plan:  Stable.  No refills needed.      5. Trigeminal neuralgia of left side of face  Assessment & Plan:  Stable.  No refills needed.        Follow Up {Instructions Charge Capture  Follow-up Communications :23}  Return in about 6 months (around  4/27/2022) for Medicare Wellness.  Patient was given instructions and counseling regarding his condition or for health maintenance advice. Please see specific information pulled into the AVS if appropriate.

## 2021-10-28 DIAGNOSIS — K21.9 GASTROESOPHAGEAL REFLUX DISEASE WITHOUT ESOPHAGITIS: ICD-10-CM

## 2021-10-28 RX ORDER — FAMOTIDINE 40 MG/1
40 TABLET, FILM COATED ORAL DAILY
Qty: 90 TABLET | Refills: 1 | Status: SHIPPED | OUTPATIENT
Start: 2021-10-28 | End: 2022-07-08

## 2021-10-28 RX ORDER — LISINOPRIL 20 MG/1
20 TABLET ORAL DAILY
Qty: 90 TABLET | Refills: 1 | Status: SHIPPED | OUTPATIENT
Start: 2021-10-28 | End: 2021-12-07 | Stop reason: SDUPTHER

## 2021-10-28 NOTE — TELEPHONE ENCOUNTER
Prescription sent.  He can double up on the 10 mg tabs he has at home in the meantime if he wants to use them up.  Thanks, BZM

## 2021-11-22 ENCOUNTER — OFFICE VISIT (OUTPATIENT)
Dept: CARDIOLOGY | Facility: CLINIC | Age: 81
End: 2021-11-22

## 2021-11-22 VITALS
HEART RATE: 58 BPM | SYSTOLIC BLOOD PRESSURE: 180 MMHG | HEIGHT: 68 IN | BODY MASS INDEX: 27.74 KG/M2 | DIASTOLIC BLOOD PRESSURE: 100 MMHG | WEIGHT: 183 LBS

## 2021-11-22 DIAGNOSIS — R07.89 CHEST PAIN, ATYPICAL: ICD-10-CM

## 2021-11-22 DIAGNOSIS — I65.22 CAROTID STENOSIS, ASYMPTOMATIC, LEFT: ICD-10-CM

## 2021-11-22 DIAGNOSIS — I10 ESSENTIAL HYPERTENSION: ICD-10-CM

## 2021-11-22 DIAGNOSIS — E78.2 MIXED HYPERLIPIDEMIA: ICD-10-CM

## 2021-11-22 DIAGNOSIS — I45.10 RBBB: ICD-10-CM

## 2021-11-22 DIAGNOSIS — I25.810 CORONARY ARTERY DISEASE INVOLVING CORONARY BYPASS GRAFT OF NATIVE HEART WITHOUT ANGINA PECTORIS: Primary | ICD-10-CM

## 2021-11-22 PROCEDURE — 99214 OFFICE O/P EST MOD 30 MIN: CPT | Performed by: NURSE PRACTITIONER

## 2021-11-22 PROCEDURE — 93000 ELECTROCARDIOGRAM COMPLETE: CPT | Performed by: NURSE PRACTITIONER

## 2021-11-22 RX ORDER — DORZOLAMIDE HYDROCHLORIDE AND TIMOLOL MALEATE 20; 5 MG/ML; MG/ML
SOLUTION/ DROPS OPHTHALMIC
COMMUNITY
Start: 2021-03-03 | End: 2022-04-26

## 2021-11-22 NOTE — PROGRESS NOTES
echDate of Office Visit: 21  Encounter Provider: RICHA Kumar  Place of Service: [facility] CARDIOLOGY  Patient Name: Mukesh Beard  :1940    Chief Complaint   Patient presents with   • Coronary artery disease involving coronary bypass graft of n   • Hyperlipidemia   • Hypertension   • Follow-up   :     HPI: Mukesh Beard is a 81 y.o. male  with hypertension, hyperlipidemia, trigeminal neuralgia, glaucoma, right bundle branch block, coronary artery disease status post coronary bypass grafting and carotid artery stenosis.  He was followed by Dr. Rizo. I will visit with him for the first time and have reviewed his medical record.       He had CABG in May 2000.  LIMA to LAD, vein graft to diagonal, vein graft to the obtuse marginal, vein graft to the second obtuse marginal vein graft to the posterior descending artery.  In , he had issues with vertigo.      He now presents for reassessment. His PCP has recently increased his lisinopril from 10 mg to 20 mg. He is not consistently checking his blood pressure at home. He does Cardioglide machine for approximately 100-150 repetitions. He also walks 1-1/4 mile on treadmill in approximately 3 minutes. He has no chest pain or shortness of breath with that exertion. He denies lightheadedness or near syncope. He has some occasional chest discomfort but he burps and that relieves. This makes him worried about his heart. He continues to have issues with trigeminal pressure glaucoma but those are stable and he keeps his follow-up with managing physicians.    He lives in [facility]  Allergies   Allergen Reactions   • Benzalkonium Unknown - Low Severity     Eye redness and swelling           Family and social history reviewed.     ROS  All other systems were reviewed and are negative          Objective:     Vitals:    21 1101   BP: 180/100   BP Location: Left arm   Patient Position: Sitting   Pulse: 58  "  Weight: 83 kg (183 lb)   Height: 172.7 cm (68\")     Body mass index is 27.83 kg/m².    PHYSICAL EXAM:  Constitutional:       General: Not in acute distress.     Appearance: Well-developed. Not diaphoretic.   HENT:      Head: Normocephalic.   Pulmonary:      Effort: Pulmonary effort is normal. No respiratory distress.      Breath sounds: Normal breath sounds. No wheezing. No rhonchi. No rales.   Cardiovascular:      Normal rate. Regular rhythm.   Pulses:     Radial: 2+ bilaterally.  Skin:     General: Skin is warm and dry. There is no cyanosis.      Findings: No rash.   Neurological:      Mental Status: Alert and oriented to person, place, and time.   Psychiatric:         Behavior: Behavior normal.         Thought Content: Thought content normal.         Judgment: Judgment normal.           ECG 12 Lead    Date/Time: 11/22/2021 11:17 AM  Performed by: Janina Calle APRN  Authorized by: Janina Calle APRN   Comparison: compared with previous ECG   Similar to previous ECG  Rhythm: sinus rhythm  Rate: normal  Conduction: right bundle branch block, left anterior fascicular block and 1st degree AV block    Clinical impression: abnormal EKG              Current Outpatient Medications   Medication Sig Dispense Refill   • aspirin 81 MG tablet Take 1 tablet by mouth Every Night. Resume 4/14/18     • carBAMazepine XR (TEGretol  XR) 100 MG 12 hr tablet TAKE 1 TABLET TWICE A  tablet 1   • cetirizine (zyrTEC) 10 MG tablet Take 10 mg by mouth Daily.     • Cholecalciferol (VITAMIN D3) 2000 units capsule Take 2,000 Units by mouth Every Night.     • Cinnamon 500 MG capsule Take 500 mg by mouth Every Morning. 2000mg qd     • Dorzolamide HCl-Timolol Mal PF (Cosopt PF) 22.3-6.8 MG/ML ophthalmic solution      • doxycycline (VIBRAMYCIN) 100 MG capsule Take 100 mg by mouth Every Morning.     • famotidine (PEPCID) 40 MG tablet Take 1 tablet by mouth Daily. 90 tablet 1   • Flaxseed, Linseed, (FLAX SEED OIL) 1000 MG capsule Take " 1,200 mg by mouth Every Morning.     • lisinopril (PRINIVIL,ZESTRIL) 20 MG tablet Take 1 tablet by mouth Daily. 90 tablet 1   • meloxicam (MOBIC) 15 MG tablet TAKE 1 TABLET DAILY AS     NEEDED 90 tablet 1   • metoprolol succinate XL (TOPROL-XL) 25 MG 24 hr tablet TAKE 1/2 TABLET DAILY 45 tablet 3   • montelukast (SINGULAIR) 10 MG tablet Take 10 mg by mouth Every Night.     • niacin 500 MG tablet Take 1,000 mg by mouth Every Night.     • Omega-3 Fatty Acids (FISH OIL) 1200 MG capsule capsule Take 1,200 mg by mouth Daily With Breakfast.     • rosuvastatin (CRESTOR) 20 MG tablet Take 20 mg by mouth Every Night.       No current facility-administered medications for this visit.     Assessment:       Diagnosis Plan   1. Coronary artery disease involving coronary bypass graft of native heart without angina pectoris  ECG 12 Lead    Stress Test With Myocardial Perfusion One Day   2. Essential hypertension  Stress Test With Myocardial Perfusion One Day    Duplex Carotid Ultrasound CAR   3. Mixed hyperlipidemia     4. RBBB     5. Carotid stenosis, asymptomatic, left  Duplex Carotid Ultrasound CAR   6. Chest pain, atypical  Stress Test With Myocardial Perfusion One Day        Orders Placed This Encounter   Procedures   • Stress Test With Myocardial Perfusion One Day     Standing Status:   Future     Standing Expiration Date:   11/22/2022     Order Specific Question:   What stress agent will be used?     Answer:   Regadenoson (Lexiscan)     Order Specific Question:   Difficulty walking criteria?     Answer:   Poor Exercise Tolerance     Order Specific Question:   Reason for exam?     Answer:   Chest Pain     Order Specific Question:   Release to patient     Answer:   Immediate   • ECG 12 Lead     This order was created via procedure documentation     Order Specific Question:   Release to patient     Answer:   Immediate         Plan:   1.  81-year-old gentleman with severe coronary artery disease status post CABG x5 in May 2000.  He has some atypical chest discomfort but with his history he is to return for now walking protocol perfusion stress test given abnormal EKG.  2.  Hyperlipidemia on rosuvastatin 20 mg.  Target LDL 70 or less  3.  Hypertension-I encouraged him to follow his blood pressure more closely at home. We will determine if he needs any additional adjustments to his medication after I review his home log  4.  Bilateral carotid artery stenoses.  Carotid ultrasound November 2019 showed mild bilateral disease with probable left subclavian steal-return for repeat duplex  5. Trigeminal neuralgia  6. Glaucoma          It has been a pleasure to participate in this patient's care.      Thank you,  RICHA Kumar      **I used Dragon to dictate this note:**

## 2021-12-06 ENCOUNTER — HOSPITAL ENCOUNTER (OUTPATIENT)
Dept: CARDIOLOGY | Facility: HOSPITAL | Age: 81
Discharge: HOME | End: 2021-12-06

## 2021-12-06 DIAGNOSIS — I10 ESSENTIAL HYPERTENSION: ICD-10-CM

## 2021-12-06 DIAGNOSIS — R07.89 CHEST PAIN, ATYPICAL: ICD-10-CM

## 2021-12-06 DIAGNOSIS — I25.810 CORONARY ARTERY DISEASE INVOLVING CORONARY BYPASS GRAFT OF NATIVE HEART WITHOUT ANGINA PECTORIS: ICD-10-CM

## 2021-12-06 DIAGNOSIS — I65.22 CAROTID STENOSIS, ASYMPTOMATIC, LEFT: ICD-10-CM

## 2021-12-06 LAB
BH CV REST NUCLEAR ISOTOPE DOSE: 59.9 MCI
BH CV STRESS BP STAGE 1: NORMAL
BH CV STRESS COMMENTS STAGE 1: NORMAL
BH CV STRESS DOSE REGADENOSON STAGE 1: 0.4
BH CV STRESS DURATION MIN STAGE 1: 0
BH CV STRESS DURATION SEC STAGE 1: 10
BH CV STRESS HR STAGE 1: 81
BH CV STRESS NUCLEAR ISOTOPE DOSE: 59.9 MCI
BH CV STRESS PROTOCOL 1: NORMAL
BH CV STRESS RECOVERY BP: NORMAL MMHG
BH CV STRESS RECOVERY HR: 73 BPM
BH CV STRESS STAGE 1: 1
LV EF NUC BP: 62 %
MAXIMAL PREDICTED HEART RATE: 138 BPM
PERCENT MAX PREDICTED HR: 58.7 %
STRESS BASELINE BP: NORMAL MMHG
STRESS BASELINE HR: 61 BPM
STRESS PERCENT HR: 69 %
STRESS POST EXERCISE DUR SEC: 10 SEC
STRESS POST PEAK BP: NORMAL MMHG
STRESS POST PEAK HR: 81 BPM
STRESS TARGET HR: 117 BPM

## 2021-12-06 PROCEDURE — 93016 CV STRESS TEST SUPVJ ONLY: CPT | Performed by: INTERNAL MEDICINE

## 2021-12-06 PROCEDURE — A9555 RB82 RUBIDIUM: HCPCS | Performed by: NURSE PRACTITIONER

## 2021-12-06 PROCEDURE — 93880 EXTRACRANIAL BILAT STUDY: CPT | Performed by: INTERNAL MEDICINE

## 2021-12-06 PROCEDURE — 78492 MYOCRD IMG PET MLT RST&STRS: CPT

## 2021-12-06 PROCEDURE — 93880 EXTRACRANIAL BILAT STUDY: CPT

## 2021-12-06 PROCEDURE — 25010000002 REGADENOSON 0.4 MG/5ML SOLUTION: Performed by: NURSE PRACTITIONER

## 2021-12-06 PROCEDURE — 0 RUBIDIUM CHLORIDE: Performed by: NURSE PRACTITIONER

## 2021-12-06 PROCEDURE — 78492 MYOCRD IMG PET MLT RST&STRS: CPT | Performed by: INTERNAL MEDICINE

## 2021-12-06 PROCEDURE — 93018 CV STRESS TEST I&R ONLY: CPT | Performed by: INTERNAL MEDICINE

## 2021-12-06 PROCEDURE — 93017 CV STRESS TEST TRACING ONLY: CPT

## 2021-12-06 RX ADMIN — REGADENOSON 0.4 MG: 0.08 INJECTION, SOLUTION INTRAVENOUS at 12:00

## 2021-12-07 ENCOUNTER — TELEPHONE (OUTPATIENT)
Dept: CARDIOLOGY | Facility: CLINIC | Age: 81
End: 2021-12-07

## 2021-12-07 DIAGNOSIS — I10 ESSENTIAL HYPERTENSION: ICD-10-CM

## 2021-12-07 LAB
BH CV XLRA MEAS LEFT DIST CCA EDV: -18.4 CM/SEC
BH CV XLRA MEAS LEFT DIST CCA PSV: -74.6 CM/SEC
BH CV XLRA MEAS LEFT DIST ICA EDV: -22.3 CM/SEC
BH CV XLRA MEAS LEFT DIST ICA PSV: -76.5 CM/SEC
BH CV XLRA MEAS LEFT ICA/CCA RATIO: 1.1
BH CV XLRA MEAS LEFT MID CCA EDV: -24.3 CM/SEC
BH CV XLRA MEAS LEFT MID CCA PSV: -114.4 CM/SEC
BH CV XLRA MEAS LEFT MID ICA EDV: -24.2 CM/SEC
BH CV XLRA MEAS LEFT MID ICA PSV: -83.3 CM/SEC
BH CV XLRA MEAS LEFT PROX ECA PSV: -83.3 CM/SEC
BH CV XLRA MEAS LEFT PROX ICA EDV: -21.3 CM/SEC
BH CV XLRA MEAS LEFT PROX ICA PSV: -62 CM/SEC
BH CV XLRA MEAS LEFT PROX SCLA PSV: 224.5 CM/SEC
BH CV XLRA MEAS LEFT VERTEBRAL A PSV: -48.4 CM/SEC
BH CV XLRA MEAS RIGHT DIST CCA EDV: 15.5 CM/SEC
BH CV XLRA MEAS RIGHT DIST CCA PSV: 69 CM/SEC
BH CV XLRA MEAS RIGHT DIST ICA EDV: -16.2 CM/SEC
BH CV XLRA MEAS RIGHT DIST ICA PSV: -72.1 CM/SEC
BH CV XLRA MEAS RIGHT ICA/CCA RATIO: 1.2
BH CV XLRA MEAS RIGHT MID CCA EDV: 16.2 CM/SEC
BH CV XLRA MEAS RIGHT MID CCA PSV: 67.7 CM/SEC
BH CV XLRA MEAS RIGHT MID ICA EDV: -16.2 CM/SEC
BH CV XLRA MEAS RIGHT MID ICA PSV: -80.1 CM/SEC
BH CV XLRA MEAS RIGHT PROX ECA PSV: -62.7 CM/SEC
BH CV XLRA MEAS RIGHT PROX ICA EDV: -21.7 CM/SEC
BH CV XLRA MEAS RIGHT PROX ICA PSV: -73.3 CM/SEC
BH CV XLRA MEAS RIGHT PROX SCLA PSV: 309.1 CM/SEC
BH CV XLRA MEAS RIGHT VERTEBRAL A PSV: -49.7 CM/SEC
MAXIMAL PREDICTED HEART RATE: 138 BPM
STRESS TARGET HR: 117 BPM

## 2021-12-07 RX ORDER — LISINOPRIL 40 MG/1
40 TABLET ORAL DAILY
Qty: 30 TABLET | Refills: 5 | Status: SHIPPED | OUTPATIENT
Start: 2021-12-07 | End: 2022-04-26 | Stop reason: SDUPTHER

## 2021-12-07 NOTE — TELEPHONE ENCOUNTER
I recommend that he increase lisinopril from 1 tablet which is 20mg to 2 tablets which equals 40 mg daily.  I will send him a new prescription but in the meantime he can take 2 of his 20 mg tablets.  Advised that he continue to check his blood pressure and call in his values just after the new year.

## 2021-12-07 NOTE — TELEPHONE ENCOUNTER
Called and spoke to patient. Went over Janina's recommendations. He verbalized understanding.    Thank you,    Georgia Vela RN  Triage MG

## 2021-12-07 NOTE — TELEPHONE ENCOUNTER
Called and spoke with patient. Went over results and recommendations. He verbalized understanding.    Percy Roa said his B/P has been high most days. Readings:    180/100  180/97  158/85  167/72  119/71  172/97    HR in the 50s.    Do you have any recommendations for him?    Thank you,    Georgia Vela, RN  Triage MG

## 2021-12-07 NOTE — TELEPHONE ENCOUNTER
----- Message from RICHA Kumar sent at 12/7/2021  7:12 AM EST -----  Please inform patient his stress was normal and carotid duplex shows mild bilateral internal carotid artery stenosis unchanged in 2019.  Carotid duplex still suggest left proximal subclavian artery stenosis with steal syndrome which Dr. Rizo saw in the past.  The right proximal subclavian artery also stenosis without steal syndrome.  He only needs to keep his follow-up appointment next year

## 2022-03-01 RX ORDER — MELOXICAM 15 MG/1
TABLET ORAL
Qty: 90 TABLET | Refills: 1 | Status: SHIPPED | OUTPATIENT
Start: 2022-03-01 | End: 2022-10-27 | Stop reason: SDUPTHER

## 2022-03-01 RX ORDER — ROSUVASTATIN CALCIUM 20 MG/1
20 TABLET, COATED ORAL NIGHTLY
Qty: 90 TABLET | Refills: 1 | Status: SHIPPED | OUTPATIENT
Start: 2022-03-01 | End: 2022-09-06

## 2022-03-28 ENCOUNTER — TELEPHONE (OUTPATIENT)
Dept: FAMILY MEDICINE CLINIC | Age: 82
End: 2022-03-28

## 2022-03-28 DIAGNOSIS — N13.30 HYDRONEPHROSIS OF LEFT KIDNEY: ICD-10-CM

## 2022-03-28 DIAGNOSIS — N20.1 LEFT URETERAL STONE: Primary | ICD-10-CM

## 2022-03-28 NOTE — TELEPHONE ENCOUNTER
Caller: Beard Mukesh EVER    Relationship: Self    Best call back number:[phone]     What is the medical concern/diagnosis: KIDNEY STONE     What specialty or service is being requested: UROLOGIST     What is the provider, practice or medical service name: Shanta Braun M.D.    What is the office location:  [address] [facility] [address], [place] [address]    What is the office phone number:  [phone]    Any additional details: PER PATIENT REQUESTING, A UROLOGY REFERRAL , FOLLOW FROM RECENT ED VISIT.

## 2022-03-29 ENCOUNTER — TELEPHONE (OUTPATIENT)
Dept: UROLOGY | Facility: CLINIC | Age: 82
End: 2022-03-29

## 2022-03-29 RX ORDER — OXYCODONE HYDROCHLORIDE 5 MG/1
1 TABLET ORAL 4 TIMES DAILY PRN
COMMUNITY
Start: 2022-03-26 | End: 2022-04-26

## 2022-03-29 RX ORDER — ONDANSETRON 4 MG/1
1 TABLET, ORALLY DISINTEGRATING ORAL AS NEEDED
COMMUNITY
Start: 2022-03-26 | End: 2022-04-26

## 2022-03-29 RX ORDER — TAMSULOSIN HYDROCHLORIDE 0.4 MG/1
1 CAPSULE ORAL DAILY
COMMUNITY
Start: 2022-03-26 | End: 2022-04-26

## 2022-03-29 NOTE — TELEPHONE ENCOUNTER
[facility] 03/26/22.  CT done and patient has 4 mm distal left ureteral stone that he has not passed.  Left flank pain and nausea.  Some chills.  No fever.  Able to urinate.  Cannot see blood in urine.  Told to f/u with Dr. Braun.  CT scanned under media.  Patient aware to get disk.    He was prescribed medication for nausea, Flomax, and pain medication.  Almost out of pain medication.    There is a referral from Dr. Barnes.

## 2022-03-30 ENCOUNTER — PREP FOR SURGERY (OUTPATIENT)
Dept: OTHER | Facility: HOSPITAL | Age: 82
End: 2022-03-30

## 2022-03-30 ENCOUNTER — OFFICE VISIT (OUTPATIENT)
Dept: UROLOGY | Facility: CLINIC | Age: 82
End: 2022-03-30

## 2022-03-30 VITALS
DIASTOLIC BLOOD PRESSURE: 78 MMHG | SYSTOLIC BLOOD PRESSURE: 157 MMHG | BODY MASS INDEX: 28.55 KG/M2 | WEIGHT: 188.4 LBS | HEIGHT: 68 IN

## 2022-03-30 DIAGNOSIS — N20.1 URETERAL STONE: Primary | ICD-10-CM

## 2022-03-30 LAB
BILIRUB BLD-MCNC: NEGATIVE MG/DL
CLARITY, POC: CLEAR
COLOR UR: YELLOW
EXPIRATION DATE: ABNORMAL
GLUCOSE UR STRIP-MCNC: NEGATIVE MG/DL
KETONES UR QL: NEGATIVE
LEUKOCYTE EST, POC: NEGATIVE
Lab: ABNORMAL
NITRITE UR-MCNC: NEGATIVE MG/ML
PH UR: 6 [PH] (ref 5–8)
PROT UR STRIP-MCNC: ABNORMAL MG/DL
RBC # UR STRIP: ABNORMAL /UL
SP GR UR: 1.02 (ref 1–1.03)
UROBILINOGEN UR QL: NORMAL

## 2022-03-30 PROCEDURE — 81003 URINALYSIS AUTO W/O SCOPE: CPT | Performed by: UROLOGY

## 2022-03-30 PROCEDURE — 99203 OFFICE O/P NEW LOW 30 MIN: CPT | Performed by: UROLOGY

## 2022-03-30 RX ORDER — SODIUM CHLORIDE 9 MG/ML
100 INJECTION, SOLUTION INTRAVENOUS CONTINUOUS
Status: CANCELLED | OUTPATIENT
Start: 2022-03-30

## 2022-03-30 RX ORDER — SODIUM CHLORIDE 0.9 % (FLUSH) 0.9 %
10 SYRINGE (ML) INJECTION AS NEEDED
Status: CANCELLED | OUTPATIENT
Start: 2022-03-30

## 2022-03-30 RX ORDER — LEVOFLOXACIN 5 MG/ML
500 INJECTION, SOLUTION INTRAVENOUS ONCE
Status: CANCELLED | OUTPATIENT
Start: 2022-03-30 | End: 2022-03-30

## 2022-03-30 RX ORDER — SODIUM CHLORIDE 0.9 % (FLUSH) 0.9 %
10 SYRINGE (ML) INJECTION EVERY 12 HOURS SCHEDULED
Status: CANCELLED | OUTPATIENT
Start: 2022-03-30

## 2022-03-30 NOTE — H&P
[facility]   Urology HISTORY AND PHYSICAL    Patient Name: Mukesh Beard  : 1940  MRN: [id number]  Primary Care Physician:  Alexandra Barnes MD  Date of admission: (Not on file)    Subjective   Subjective     Chief Complaint: Left flank pain    Patient has a distal left 4mm stone and presents for  left ureteroscopy, laser lithotripsy and left ureteral stent placement      Personal History     Past Medical History:   Diagnosis Date   • Anesthesia complication     son woke up during surgery  knee replacement   • Ankylosing spondylitis of site in spine ([facility])    • Arthritis    • ASCVD (arteriosclerotic cardiovascular disease)     mild lv dysfunction   • Coronary artery disease May 2000   • Diabetes mellitus ([facility])     high b/s diet controlled   • GERD (gastroesophageal reflux disease)    • Glaucoma    • Hyperlipidemia    • Hypertension    • 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)    • Myocardial infarction ([facility])    • Osteoarthritis    • Ringing in ear, bilateral    • Subclavian artery stenosis, left ([facility])    • Subclavian artery stenosis, right ([facility])    • Vertigo 2011       Past Surgical History:   Procedure Laterality Date   • APPENDECTOMY     • CATARACT EXTRACTION     • CHOLECYSTECTOMY     • CORONARY ARTERY BYPASS GRAFT  2000   • EPIDURAL BLOCK     • EYE SURGERY      rt tear duct sx   • HAND SURGERY Bilateral    • JOINT REPLACEMENT     • SHOULDER SURGERY     • TOTAL SHOULDER ARTHROPLASTY W/ DISTAL CLAVICLE EXCISION Left 2018    Procedure: Reverse Total Shoulder Arthroplsty;  Surgeon: Ja Archer MD;  Location: [facility];  Service: Orthopedics       Family History: family history includes Heart disease in his mother. Otherwise pertinent FHx was reviewed and not pertinent to current issue.    Social History:  reports that he quit smoking about 50 years ago. His smoking use included cigarettes. He has never used smokeless tobacco. He reports current alcohol use. He  reports that he does not use drugs.    Home Medications:  Cinnamon, Dorzolamide HCl-Timolol Mal PF, Flax Seed Oil, Vitamin D3, aspirin, carBAMazepine XR, cetirizine, famotidine, fish oil, lisinopril, meloxicam, metoprolol succinate XL, montelukast, niacin, ondansetron ODT, oxyCODONE, rosuvastatin, and tamsulosin    Allergies:  Allergies   Allergen Reactions   • Benzalkonium Unknown - Low Severity     Eye redness and swelling       Objective    Objective     Vitals:   BP: (157)/(78) 157/78    Physical Exam  Constitutional:       Appearance: Normal appearance.   Cardiovascular:      Rate and Rhythm: Normal rate and regular rhythm.   Pulmonary:      Effort: Pulmonary effort is normal.      Breath sounds: Normal breath sounds.   Neurological:      Mental Status: He is alert. Mental status is at baseline.   Psychiatric:         Mood and Affect: Mood and affect normal.         Speech: Speech normal.         Judgment: Judgment normal.         Result Review    Result Review:  I have personally reviewed the results from the time of this admission to 3/30/2022 16:53 EDT and agree with these findings:  [x]  Laboratory  []  Microbiology  [x]  Radiology  []  EKG/Telemetry   []  Cardiology/Vascular   []  Pathology  [x]  Old records  []  Other:      Assessment/Plan   Assessment / Plan       Active Hospital Problems:  There are no active hospital problems to display for this patient.      Plan: Left ureteroscopy, laser lithotripsy and left ureteral stent placement.  Risks and benefits discussed with patient and they are agreeable to proceed.    DVT prophylaxis:  No DVT prophylaxis order currently exists.    CODE STATUS:           Electronically signed by Shanta Braun MD, 03/30/22, 4:53 PM EDT.

## 2022-03-31 ENCOUNTER — APPOINTMENT (OUTPATIENT)
Dept: GENERAL RADIOLOGY | Facility: HOSPITAL | Age: 82
End: 2022-03-31

## 2022-03-31 ENCOUNTER — ANESTHESIA (OUTPATIENT)
Dept: PERIOP | Facility: HOSPITAL | Age: 82
End: 2022-03-31

## 2022-03-31 ENCOUNTER — HOSPITAL ENCOUNTER (OUTPATIENT)
Facility: HOSPITAL | Age: 82
Setting detail: HOSPITAL OUTPATIENT SURGERY
Discharge: HOME | End: 2022-03-31
Attending: UROLOGY | Admitting: UROLOGY

## 2022-03-31 ENCOUNTER — ANESTHESIA EVENT (OUTPATIENT)
Dept: PERIOP | Facility: HOSPITAL | Age: 82
End: 2022-03-31

## 2022-03-31 VITALS
OXYGEN SATURATION: 98 % | HEART RATE: 61 BPM | HEIGHT: 67 IN | RESPIRATION RATE: 16 BRPM | TEMPERATURE: 98 F | BODY MASS INDEX: 29.72 KG/M2 | SYSTOLIC BLOOD PRESSURE: 154 MMHG | DIASTOLIC BLOOD PRESSURE: 73 MMHG | WEIGHT: 189.38 LBS

## 2022-03-31 DIAGNOSIS — N20.1 URETERAL STONE: ICD-10-CM

## 2022-03-31 PROCEDURE — 88300 SURGICAL PATH GROSS: CPT | Performed by: UROLOGY

## 2022-03-31 PROCEDURE — 52332 CYSTOSCOPY AND TREATMENT: CPT | Performed by: UROLOGY

## 2022-03-31 PROCEDURE — 25010000002 ONDANSETRON PER 1 MG: Performed by: NURSE ANESTHETIST, CERTIFIED REGISTERED

## 2022-03-31 PROCEDURE — 82365 CALCULUS SPECTROSCOPY: CPT | Performed by: UROLOGY

## 2022-03-31 PROCEDURE — C1758 CATHETER, URETERAL: HCPCS | Performed by: UROLOGY

## 2022-03-31 PROCEDURE — 76000 FLUOROSCOPY <1 HR PHYS/QHP: CPT

## 2022-03-31 PROCEDURE — C1769 GUIDE WIRE: HCPCS | Performed by: UROLOGY

## 2022-03-31 PROCEDURE — 25010000002 LEVOFLOXACIN PER 250 MG: Performed by: UROLOGY

## 2022-03-31 PROCEDURE — C2617 STENT, NON-COR, TEM W/O DEL: HCPCS | Performed by: UROLOGY

## 2022-03-31 PROCEDURE — 25010000002 DEXAMETHASONE PER 1 MG: Performed by: NURSE ANESTHETIST, CERTIFIED REGISTERED

## 2022-03-31 PROCEDURE — 25010000002 FENTANYL CITRATE (PF) 50 MCG/ML SOLUTION: Performed by: NURSE ANESTHETIST, CERTIFIED REGISTERED

## 2022-03-31 PROCEDURE — 52352 CYSTOURETERO W/STONE REMOVE: CPT | Performed by: UROLOGY

## 2022-03-31 PROCEDURE — 25010000002 MIDAZOLAM PER 1 MG: Performed by: ANESTHESIOLOGY

## 2022-03-31 PROCEDURE — 25010000002 PROPOFOL 10 MG/ML EMULSION: Performed by: NURSE ANESTHETIST, CERTIFIED REGISTERED

## 2022-03-31 PROCEDURE — 74018 RADEX ABDOMEN 1 VIEW: CPT

## 2022-03-31 DEVICE — STNT CLASSC DBL PIG 4.5F 26CM: Type: IMPLANTABLE DEVICE | Site: URETER | Status: FUNCTIONAL

## 2022-03-31 RX ORDER — OXYCODONE HYDROCHLORIDE 5 MG/1
5 TABLET ORAL
Status: DISCONTINUED | OUTPATIENT
Start: 2022-03-31 | End: 2022-03-31 | Stop reason: HOSPADM

## 2022-03-31 RX ORDER — IBUPROFEN 600 MG/1
600 TABLET ORAL EVERY 6 HOURS PRN
Status: DISCONTINUED | OUTPATIENT
Start: 2022-03-31 | End: 2022-03-31 | Stop reason: HOSPADM

## 2022-03-31 RX ORDER — PROPOFOL 10 MG/ML
VIAL (ML) INTRAVENOUS AS NEEDED
Status: DISCONTINUED | OUTPATIENT
Start: 2022-03-31 | End: 2022-03-31 | Stop reason: SURG

## 2022-03-31 RX ORDER — ACETAMINOPHEN 325 MG/1
650 TABLET ORAL ONCE
Status: DISCONTINUED | OUTPATIENT
Start: 2022-03-31 | End: 2022-03-31 | Stop reason: HOSPADM

## 2022-03-31 RX ORDER — PHENYLEPHRINE HCL IN 0.9% NACL 1 MG/10 ML
SYRINGE (ML) INTRAVENOUS AS NEEDED
Status: DISCONTINUED | OUTPATIENT
Start: 2022-03-31 | End: 2022-03-31 | Stop reason: SURG

## 2022-03-31 RX ORDER — SODIUM CHLORIDE 0.9 % (FLUSH) 0.9 %
10 SYRINGE (ML) INJECTION AS NEEDED
Status: DISCONTINUED | OUTPATIENT
Start: 2022-03-31 | End: 2022-03-31 | Stop reason: HOSPADM

## 2022-03-31 RX ORDER — MAGNESIUM HYDROXIDE 1200 MG/15ML
LIQUID ORAL AS NEEDED
Status: DISCONTINUED | OUTPATIENT
Start: 2022-03-31 | End: 2022-03-31 | Stop reason: HOSPADM

## 2022-03-31 RX ORDER — PROMETHAZINE HYDROCHLORIDE 25 MG/1
25 SUPPOSITORY RECTAL ONCE AS NEEDED
Status: DISCONTINUED | OUTPATIENT
Start: 2022-03-31 | End: 2022-03-31 | Stop reason: HOSPADM

## 2022-03-31 RX ORDER — SODIUM CHLORIDE 9 MG/ML
100 INJECTION, SOLUTION INTRAVENOUS CONTINUOUS
Status: DISCONTINUED | OUTPATIENT
Start: 2022-03-31 | End: 2022-03-31 | Stop reason: HOSPADM

## 2022-03-31 RX ORDER — LIDOCAINE HYDROCHLORIDE 20 MG/ML
INJECTION, SOLUTION INFILTRATION; PERINEURAL AS NEEDED
Status: DISCONTINUED | OUTPATIENT
Start: 2022-03-31 | End: 2022-03-31 | Stop reason: SURG

## 2022-03-31 RX ORDER — SODIUM CHLORIDE 0.9 % (FLUSH) 0.9 %
10 SYRINGE (ML) INJECTION EVERY 12 HOURS SCHEDULED
Status: DISCONTINUED | OUTPATIENT
Start: 2022-03-31 | End: 2022-03-31 | Stop reason: HOSPADM

## 2022-03-31 RX ORDER — PROMETHAZINE HYDROCHLORIDE 12.5 MG/1
25 TABLET ORAL ONCE AS NEEDED
Status: DISCONTINUED | OUTPATIENT
Start: 2022-03-31 | End: 2022-03-31 | Stop reason: HOSPADM

## 2022-03-31 RX ORDER — ACETAMINOPHEN 500 MG
1000 TABLET ORAL ONCE
Status: COMPLETED | OUTPATIENT
Start: 2022-03-31 | End: 2022-03-31

## 2022-03-31 RX ORDER — ONDANSETRON 2 MG/ML
INJECTION INTRAMUSCULAR; INTRAVENOUS AS NEEDED
Status: DISCONTINUED | OUTPATIENT
Start: 2022-03-31 | End: 2022-03-31 | Stop reason: SURG

## 2022-03-31 RX ORDER — MIDAZOLAM HYDROCHLORIDE 1 MG/ML
0.5 INJECTION INTRAMUSCULAR; INTRAVENOUS ONCE
Status: COMPLETED | OUTPATIENT
Start: 2022-03-31 | End: 2022-03-31

## 2022-03-31 RX ORDER — MEPERIDINE HYDROCHLORIDE 25 MG/ML
12.5 INJECTION INTRAMUSCULAR; INTRAVENOUS; SUBCUTANEOUS
Status: DISCONTINUED | OUTPATIENT
Start: 2022-03-31 | End: 2022-03-31 | Stop reason: HOSPADM

## 2022-03-31 RX ORDER — ONDANSETRON 2 MG/ML
4 INJECTION INTRAMUSCULAR; INTRAVENOUS ONCE AS NEEDED
Status: DISCONTINUED | OUTPATIENT
Start: 2022-03-31 | End: 2022-03-31 | Stop reason: HOSPADM

## 2022-03-31 RX ORDER — OXYCODONE HYDROCHLORIDE AND ACETAMINOPHEN 5; 325 MG/1; MG/1
1-2 TABLET ORAL EVERY 4 HOURS PRN
Qty: 20 TABLET | Refills: 0 | Status: SHIPPED | OUTPATIENT
Start: 2022-03-31 | End: 2022-04-26

## 2022-03-31 RX ORDER — FENTANYL CITRATE 50 UG/ML
INJECTION, SOLUTION INTRAMUSCULAR; INTRAVENOUS AS NEEDED
Status: DISCONTINUED | OUTPATIENT
Start: 2022-03-31 | End: 2022-03-31 | Stop reason: SURG

## 2022-03-31 RX ORDER — DEXAMETHASONE SODIUM PHOSPHATE 4 MG/ML
INJECTION, SOLUTION INTRA-ARTICULAR; INTRALESIONAL; INTRAMUSCULAR; INTRAVENOUS; SOFT TISSUE AS NEEDED
Status: DISCONTINUED | OUTPATIENT
Start: 2022-03-31 | End: 2022-03-31 | Stop reason: SURG

## 2022-03-31 RX ORDER — PROMETHAZINE HYDROCHLORIDE 12.5 MG/1
12.5 TABLET ORAL ONCE AS NEEDED
Status: DISCONTINUED | OUTPATIENT
Start: 2022-03-31 | End: 2022-03-31 | Stop reason: HOSPADM

## 2022-03-31 RX ORDER — LEVOFLOXACIN 5 MG/ML
500 INJECTION, SOLUTION INTRAVENOUS ONCE
Status: COMPLETED | OUTPATIENT
Start: 2022-03-31 | End: 2022-03-31

## 2022-03-31 RX ORDER — SODIUM CHLORIDE, SODIUM LACTATE, POTASSIUM CHLORIDE, CALCIUM CHLORIDE 600; 310; 30; 20 MG/100ML; MG/100ML; MG/100ML; MG/100ML
9 INJECTION, SOLUTION INTRAVENOUS CONTINUOUS PRN
Status: DISCONTINUED | OUTPATIENT
Start: 2022-03-31 | End: 2022-03-31 | Stop reason: HOSPADM

## 2022-03-31 RX ADMIN — LIDOCAINE HYDROCHLORIDE 40 MG: 20 INJECTION, SOLUTION INFILTRATION; PERINEURAL at 14:36

## 2022-03-31 RX ADMIN — Medication 50 MCG: at 14:42

## 2022-03-31 RX ADMIN — DEXAMETHASONE SODIUM PHOSPHATE 4 MG: 4 INJECTION INTRA-ARTICULAR; INTRALESIONAL; INTRAMUSCULAR; INTRAVENOUS; SOFT TISSUE at 15:03

## 2022-03-31 RX ADMIN — PROPOFOL 140 MG: 10 INJECTION, EMULSION INTRAVENOUS at 14:36

## 2022-03-31 RX ADMIN — SODIUM CHLORIDE, POTASSIUM CHLORIDE, SODIUM LACTATE AND CALCIUM CHLORIDE 9 ML/HR: 600; 310; 30; 20 INJECTION, SOLUTION INTRAVENOUS at 14:16

## 2022-03-31 RX ADMIN — MIDAZOLAM HYDROCHLORIDE 0.5 MG: 1 INJECTION, SOLUTION INTRAMUSCULAR; INTRAVENOUS at 14:27

## 2022-03-31 RX ADMIN — FENTANYL CITRATE 100 MCG: 50 INJECTION, SOLUTION INTRAMUSCULAR; INTRAVENOUS at 14:36

## 2022-03-31 RX ADMIN — LEVOFLOXACIN 500 MG: 5 INJECTION, SOLUTION INTRAVENOUS at 14:43

## 2022-03-31 RX ADMIN — ACETAMINOPHEN 1000 MG: 500 TABLET ORAL at 14:23

## 2022-03-31 RX ADMIN — Medication 100 MCG: at 14:46

## 2022-03-31 RX ADMIN — ONDANSETRON 4 MG: 2 INJECTION INTRAMUSCULAR; INTRAVENOUS at 15:03

## 2022-03-31 NOTE — ANESTHESIA POSTPROCEDURE EVALUATION
Patient: Mukesh Beard    Procedure Summary     Date: 03/31/22 Room / Location: [facility] OR 07 / [facility] MAIN OR    Anesthesia Start: 1433 Anesthesia Stop: 1511    Procedure: CYSTOSCOPY URETEROSCOPY BASKET STONE EXTRACTION WITH STENT INSERTION (Left ) Diagnosis:       Ureteral stone      (Ureteral stone [N20.1])    Surgeons: Shanta Braun MD Provider: Pepe Way MD    Anesthesia Type: general ASA Status: 4          Anesthesia Type: general    Vitals  Vitals Value Taken Time   /72 03/31/22 1540   Temp 37.3 °C (99.2 °F) 03/31/22 1540   Pulse 58 03/31/22 1540   Resp 17 03/31/22 1540   SpO2 95 % 03/31/22 1540           Post Anesthesia Care and Evaluation    Patient location during evaluation: bedside  Patient participation: complete - patient participated  Level of consciousness: awake, responsive to light touch, responsive to noxious stimuli, responsive to painful stimuli, responsive to physical stimuli and responsive to verbal stimuli  Pain score: 2  Pain management: adequate  Airway patency: patent  Anesthetic complications: No anesthetic complications  PONV Status: none  Cardiovascular status: acceptable and stable  Respiratory status: acceptable and nasal cannula  Hydration status: acceptable    Comments: An Anesthesiologist personally participated in the most demanding procedures (including induction and emergence if applicable) in the anesthesia plan, monitored the course of anesthesia administration at frequent intervals and remained physically present and available for immediate diagnosis and treatment of emergencies.

## 2022-03-31 NOTE — ANESTHESIA PREPROCEDURE EVALUATION
Anesthesia Evaluation     Patient summary reviewed and Nursing notes reviewed                Airway   Mallampati: I  TM distance: >3 FB  Neck ROM: full  No difficulty expected  Dental    (+) upper dentures    Pulmonary - negative pulmonary ROS and normal exam    breath sounds clear to auscultation  Cardiovascular     Rhythm: regular  Rate: normal    (+) hypertension, past MI , CAD, CABG, murmur, PVD, hyperlipidemia,       Neuro/Psych  (+) headaches, dizziness/light headedness, numbness,    GI/Hepatic/Renal/Endo    (+)  GERD,  diabetes mellitus,     Musculoskeletal     Abdominal    Substance History - negative use     OB/GYN negative ob/gyn ROS         Other   arthritis,                      Anesthesia Plan    ASA 4     general     intravenous induction     Anesthetic plan, all risks, benefits, and alternatives have been provided, discussed and informed consent has been obtained with: patient.        CODE STATUS:

## 2022-04-01 LAB
LAB AP CASE REPORT: NORMAL
LAB AP CLINICAL INFORMATION: NORMAL
PATH REPORT.FINAL DX SPEC: NORMAL
PATH REPORT.GROSS SPEC: NORMAL

## 2022-04-06 RX ORDER — CARBAMAZEPINE 100 MG/1
100 TABLET, EXTENDED RELEASE ORAL 2 TIMES DAILY
Qty: 180 TABLET | Refills: 0 | Status: SHIPPED | OUTPATIENT
Start: 2022-04-06 | End: 2022-07-08

## 2022-04-07 LAB
COLOR STONE: NORMAL
COM MFR STONE: 100 %
COMPN STONE: NORMAL
LABORATORY COMMENT REPORT: NORMAL
Lab: NORMAL
Lab: NORMAL
PHOTO: NORMAL
SIZE STONE: NORMAL MM
SPEC SOURCE SUBJ: NORMAL
WT STONE: 21 MG

## 2022-04-13 DIAGNOSIS — N20.1 URETERAL STONE: Primary | ICD-10-CM

## 2022-04-13 DIAGNOSIS — Z87.442 HISTORY OF KIDNEY STONES: ICD-10-CM

## 2022-04-26 ENCOUNTER — OFFICE VISIT (OUTPATIENT)
Dept: FAMILY MEDICINE CLINIC | Age: 82
End: 2022-04-26

## 2022-04-26 ENCOUNTER — HOSPITAL ENCOUNTER (OUTPATIENT)
Dept: GENERAL RADIOLOGY | Facility: HOSPITAL | Age: 82
Discharge: HOME | End: 2022-04-26

## 2022-04-26 ENCOUNTER — LAB (OUTPATIENT)
Dept: LAB | Facility: HOSPITAL | Age: 82
End: 2022-04-26

## 2022-04-26 VITALS
HEART RATE: 54 BPM | TEMPERATURE: 97.7 F | BODY MASS INDEX: 29.19 KG/M2 | DIASTOLIC BLOOD PRESSURE: 93 MMHG | HEIGHT: 67 IN | WEIGHT: 186 LBS | SYSTOLIC BLOOD PRESSURE: 211 MMHG

## 2022-04-26 DIAGNOSIS — I25.10 CORONARY ARTERY DISEASE INVOLVING NATIVE CORONARY ARTERY OF NATIVE HEART WITHOUT ANGINA PECTORIS: ICD-10-CM

## 2022-04-26 DIAGNOSIS — I10 ESSENTIAL HYPERTENSION: ICD-10-CM

## 2022-04-26 DIAGNOSIS — K21.9 GASTROESOPHAGEAL REFLUX DISEASE WITHOUT ESOPHAGITIS: ICD-10-CM

## 2022-04-26 DIAGNOSIS — Z00.00 ANNUAL PHYSICAL EXAM: Primary | ICD-10-CM

## 2022-04-26 DIAGNOSIS — M25.511 ACUTE PAIN OF RIGHT SHOULDER: ICD-10-CM

## 2022-04-26 DIAGNOSIS — E78.2 MIXED HYPERLIPIDEMIA: ICD-10-CM

## 2022-04-26 LAB
ALBUMIN SERPL-MCNC: 4.5 G/DL (ref 3.5–5.2)
ALBUMIN/GLOB SERPL: 1.8 G/DL
ALP SERPL-CCNC: 96 U/L (ref 39–117)
ALT SERPL W P-5'-P-CCNC: 30 U/L (ref 1–41)
ANION GAP SERPL CALCULATED.3IONS-SCNC: 9.1 MMOL/L (ref 5–15)
AST SERPL-CCNC: 22 U/L (ref 1–40)
BILIRUB SERPL-MCNC: 0.3 MG/DL (ref 0–1.2)
BUN SERPL-MCNC: 13 MG/DL (ref 8–23)
BUN/CREAT SERPL: 14.9 (ref 7–25)
CALCIUM SPEC-SCNC: 9.4 MG/DL (ref 8.6–10.5)
CHLORIDE SERPL-SCNC: 107 MMOL/L (ref 98–107)
CHOLEST SERPL-MCNC: 115 MG/DL (ref 0–200)
CO2 SERPL-SCNC: 26.9 MMOL/L (ref 22–29)
CREAT SERPL-MCNC: 0.87 MG/DL (ref 0.76–1.27)
EGFRCR SERPLBLD CKD-EPI 2021: 86.1 ML/MIN/1.73
GLOBULIN UR ELPH-MCNC: 2.5 GM/DL
GLUCOSE SERPL-MCNC: 110 MG/DL (ref 65–99)
HDLC SERPL-MCNC: 37 MG/DL (ref 40–60)
LDLC SERPL CALC-MCNC: 55 MG/DL (ref 0–100)
LDLC/HDLC SERPL: 1.42 {RATIO}
POTASSIUM SERPL-SCNC: 4.8 MMOL/L (ref 3.5–5.2)
PROT SERPL-MCNC: 7 G/DL (ref 6–8.5)
SODIUM SERPL-SCNC: 143 MMOL/L (ref 136–145)
TRIGL SERPL-MCNC: 127 MG/DL (ref 0–150)
VLDLC SERPL-MCNC: 23 MG/DL (ref 5–40)

## 2022-04-26 PROCEDURE — 1159F MED LIST DOCD IN RCRD: CPT | Performed by: FAMILY MEDICINE

## 2022-04-26 PROCEDURE — G0439 PPPS, SUBSEQ VISIT: HCPCS | Performed by: FAMILY MEDICINE

## 2022-04-26 PROCEDURE — 73030 X-RAY EXAM OF SHOULDER: CPT

## 2022-04-26 PROCEDURE — 99214 OFFICE O/P EST MOD 30 MIN: CPT | Performed by: FAMILY MEDICINE

## 2022-04-26 PROCEDURE — 1170F FXNL STATUS ASSESSED: CPT | Performed by: FAMILY MEDICINE

## 2022-04-26 PROCEDURE — 80053 COMPREHEN METABOLIC PANEL: CPT

## 2022-04-26 PROCEDURE — 80061 LIPID PANEL: CPT

## 2022-04-26 PROCEDURE — 36415 COLL VENOUS BLD VENIPUNCTURE: CPT

## 2022-04-26 RX ORDER — AMLODIPINE BESYLATE 5 MG/1
5 TABLET ORAL DAILY
Qty: 90 TABLET | Refills: 1 | Status: SHIPPED | OUTPATIENT
Start: 2022-04-26 | End: 2022-10-27 | Stop reason: SDUPTHER

## 2022-04-26 RX ORDER — LISINOPRIL 40 MG/1
40 TABLET ORAL DAILY
Qty: 90 TABLET | Refills: 1 | Status: SHIPPED | OUTPATIENT
Start: 2022-04-26 | End: 2022-10-27 | Stop reason: SDUPTHER

## 2022-04-26 RX ORDER — AMLODIPINE BESYLATE 5 MG/1
5 TABLET ORAL DAILY
Qty: 30 TABLET | Refills: 0 | Status: SHIPPED | OUTPATIENT
Start: 2022-04-26 | End: 2022-04-26 | Stop reason: SDUPTHER

## 2022-04-26 NOTE — ASSESSMENT & PLAN NOTE
Blood pressure is running quite elevated today.  He was checking this fairly regularly at home up until April when he started to have a number of acute issues and blood pressure at that time was running in the 130s/60s.  I am going to continue his lisinopril 40 mg daily and metoprolol succinate 25 mg daily.  His pulse is pretty low so he does not have room to increase the beta-blocker but we can add some amlodipine today at 5 mg daily.  I am can have him start checking blood pressure at home again and he will let me know if after a month or so he does not see numbers running at goal range.  He has been under a lot of stress lately, so this may be contributing to his elevation today.  He is completely asymptomatic at this time.

## 2022-04-26 NOTE — PROGRESS NOTES
The ABCs of the Annual Wellness Visit  Subsequent Medicare Wellness Visit    Chief Complaint   Patient presents with   • Medicare Wellness-subsequent      Subjective    History of Present Illness:  Mukesh Beard is a 81 y.o. male who presents for a Subsequent Medicare Wellness Visit.    He is due for colonoscopy, last done 2021 by Dr. Foote and this showed tubular adenoma x2.  Prostate cancer screening is no longer indicated by age.  He is UTD on COVID (x2, due for shot #3), Pneumovax (6/2015), Prevnar (11/2014), Zostavax (8/2010), Tdap (2/2013), and flu (10/2021).  He is due for Shingrix. He is due for routine labs including HTN panel.      He was diagnosed with a kidney stone back in April after a trip to the ED.  He ended up getting lithotripsy with Dr. Braun for a 4 mm stone.      A couple weeks later, he was out weed eating and fell, landing on his R shoulder.  He had a lot of pain and bruising, and he thought he felt a pop when he fell.  He still has full range of motion but it is painful and he cannot lie on that shoulder very well.    He is following with Ophtho for glaucoma.  He has had some procedures done on both eyes now, and that seems to have improved the pressure quite a bit.  No longer on Cosopt.    He is on lisinopril and metoprolol succinate for hypertension.  Blood pressure has been well controlled in general, although it is quite high today.   No chest pain, palpitations, dizziness, lightheadedness, headaches, changes in vision, shortness of air.  He was checking at home and it was running 130s/60s but has not been checking for the past month or so.  H/o CAD, s/p 5 v. CABG.  he follows with Cardiology.  He is on rosuvastatin for hyperlipidemia and CAD.  He denies myalgias.  He had a stress test last November and that was normal.  He goes back next November.      He is on meloxicam for chronic pain in the shoulder and back due to arthritis.      He is on Singulair and Zyrtec for  allergies.      He is on carbamazepine for trigeminal neuralgia of the left  side of the face.  His symptoms have been well controlled since starting this.    The following portions of the patient's history were reviewed and   updated as appropriate: allergies, current medications, past family history, past medical history, past social history, past surgical history and problem list.    Compared to one year ago, the patient feels his physical   health is the same.    Compared to one year ago, the patient feels his mental   health is the same.    Recent Hospitalizations:  He was not admitted to the hospital during the last year.       Current Medical Providers:  Patient Care Team:  Alexandra Barnes MD as PCP - General (Family Medicine)  Elian Rizo MD as Consulting Physician (Cardiology)  Shanta Braun MD as Consulting Physician (Urology)    Outpatient Medications Prior to Visit   Medication Sig Dispense Refill   • carBAMazepine XR (TEGretol  XR) 100 MG 12 hr tablet Take 1 tablet by mouth 2 (Two) Times a Day. 180 tablet 0   • cetirizine (zyrTEC) 10 MG tablet Take 10 mg by mouth Daily.     • Cholecalciferol (VITAMIN D3) 2000 units capsule Take 2,000 Units by mouth Every Night.     • Cinnamon 500 MG capsule Take 500 mg by mouth Every Morning. 2000mg qd     • famotidine (PEPCID) 40 MG tablet Take 1 tablet by mouth Daily. 90 tablet 1   • Flaxseed, Linseed, (FLAX SEED OIL) 1000 MG capsule Take 1,200 mg by mouth Every Morning.     • meloxicam (MOBIC) 15 MG tablet TAKE 1 TABLET DAILY AS     NEEDED 90 tablet 1   • metoprolol succinate XL (TOPROL-XL) 25 MG 24 hr tablet TAKE 1/2 TABLET DAILY 45 tablet 3   • montelukast (SINGULAIR) 10 MG tablet Take 10 mg by mouth Every Night.     • niacin 500 MG tablet Take 1,000 mg by mouth Every Night.     • Omega-3 Fatty Acids (FISH OIL) 1200 MG capsule capsule Take 1,200 mg by mouth Daily With Breakfast.     • rosuvastatin (Crestor) 20 MG tablet Take 1 tablet by mouth  Every Night. 90 tablet 1   • lisinopril (PRINIVIL,ZESTRIL) 40 MG tablet Take 1 tablet by mouth Daily. 30 tablet 5   • aspirin 81 MG tablet Take 1 tablet by mouth Every Night. Resume 4/14/18     • Dorzolamide HCl-Timolol Mal PF (Cosopt PF) 22.3-6.8 MG/ML ophthalmic solution      • ondansetron ODT (ZOFRAN-ODT) 4 MG disintegrating tablet Place 1 tablet on the tongue As Needed.     • oxyCODONE (ROXICODONE) 5 MG immediate release tablet Take 1 tablet by mouth 4 (Four) Times a Day As Needed.     • oxyCODONE-acetaminophen (PERCOCET) 5-325 MG per tablet Take 1-2 tablets by mouth Every 4 (Four) Hours As Needed for Moderate Pain  or Severe Pain  (Pain). 20 tablet 0   • tamsulosin (FLOMAX) 0.4 MG capsule 24 hr capsule Take 1 capsule by mouth Daily.       No facility-administered medications prior to visit.       No opioid medication identified on active medication list. I have reviewed chart for other potential  high risk medication/s and harmful drug interactions in the elderly.          Aspirin is on active medication list. Aspirin use is indicated based on review of current medical condition/s. Pros and cons of this therapy have been discussed today. Benefits of this medication outweigh potential harm.  Patient has been encouraged to continue taking this medication.  .      Patient Active Problem List   Diagnosis   • Complete tear of left rotator cuff   • Rotator cuff arthropathy   • Essential hypertension   • Coronary artery disease involving native coronary artery of native heart without angina pectoris   • Bilateral primary osteoarthritis of knee   • Mixed hyperlipidemia   • Trigeminal neuralgia of left side of face   • Gastroesophageal reflux disease without esophagitis   • Chronic bilateral low back pain without sciatica   • Ureteral stone   • Annual physical exam     Advance Care Planning  Advance Directive is not on file.  ACP discussion was held with the patient during this visit. Patient does not have an advance  "directive, information provided.    Review of Systems   Constitutional: Negative for chills, fatigue and fever.   HENT: Negative for congestion, hearing loss and rhinorrhea.    Eyes: Negative for pain and visual disturbance.   Respiratory: Negative for cough and shortness of breath.    Cardiovascular: Negative for chest pain and palpitations.   Gastrointestinal: Negative for abdominal pain, constipation, diarrhea, nausea and vomiting.   Genitourinary: Negative for difficulty urinating and dysuria.   Musculoskeletal: Positive for arthralgias and back pain. Negative for myalgias.   Neurological: Negative for weakness and numbness.   Psychiatric/Behavioral: Negative for dysphoric mood and sleep disturbance. The patient is not nervous/anxious.         Objective    Vitals:    04/26/22 1024 04/26/22 1103   BP: (!) 209/92 (!) 211/93   BP Location: Left arm Left arm   Patient Position: Sitting Sitting   Pulse: 55 54   Temp: 97.7 °F (36.5 °C)    TempSrc: Oral    Weight: 84.4 kg (186 lb)    Height: 170.2 cm (67\")      BMI Readings from Last 1 Encounters:   04/26/22 29.13 kg/m²   BMI is above normal parameters. Recommendations include: exercise counseling and nutrition counseling    Does the patient have evidence of cognitive impairment? No    Physical Exam  Vitals reviewed.   Constitutional:       General: He is not in acute distress.     Appearance: Normal appearance. He is well-developed.   HENT:      Head: Normocephalic and atraumatic.      Right Ear: External ear normal.      Left Ear: External ear normal.      Mouth/Throat:      Mouth: Mucous membranes are moist.   Eyes:      Extraocular Movements: Extraocular movements intact.      Conjunctiva/sclera: Conjunctivae normal.      Pupils: Pupils are equal, round, and reactive to light.   Cardiovascular:      Rate and Rhythm: Normal rate and regular rhythm.      Heart sounds: No murmur heard.  Pulmonary:      Effort: Pulmonary effort is normal.      Breath sounds: Normal " breath sounds. No wheezing, rhonchi or rales.   Abdominal:      General: Bowel sounds are normal. There is no distension.      Palpations: Abdomen is soft.      Tenderness: There is no abdominal tenderness.   Musculoskeletal:         General: Normal range of motion.   Skin:     General: Skin is warm and dry.   Neurological:      Mental Status: He is alert and oriented to person, place, and time.      Deep Tendon Reflexes: Reflexes normal.   Psychiatric:         Mood and Affect: Mood and affect normal.         Behavior: Behavior normal.         Thought Content: Thought content normal.         Judgment: Judgment normal.                 HEALTH RISK ASSESSMENT    Smoking Status:  Social History     Tobacco Use   Smoking Status Former Smoker   • Types: Cigarettes   • Quit date:    • Years since quittin.3   Smokeless Tobacco Never Used   Tobacco Comment    caffeine use-coffee     Alcohol Consumption:  Social History     Substance and Sexual Activity   Alcohol Use Yes    Comment: RARE     Fall Risk Screen:    [facility] Fall Risk Assessment has not been completed.    Depression Screening:  PHQ-2/PHQ-9 Depression Screening 2022   Retired PHQ-9 Total Score -   Retired Total Score -   Little Interest or Pleasure in Doing Things 0-->not at all   Feeling Down, Depressed or Hopeless 0-->not at all   PHQ-9: Brief Depression Severity Measure Score 0       Health Habits and Functional and Cognitive Screening:  Functional & Cognitive Status 2022   Do you have difficulty preparing food and eating? No   Do you have difficulty bathing yourself, getting dressed or grooming yourself? No   Do you have difficulty using the toilet? No   Do you have difficulty moving around from place to place? No   Do you have trouble with steps or getting out of a bed or a chair? No   Current Diet Well Balanced Diet   Dental Exam Not up to date   Eye Exam Up to date   Exercise (times per week) 7 times per week   Current Exercises Include  Walking;Home Fitness Gym   Do you need help using the phone?  No   Are you deaf or do you have serious difficulty hearing?  No   Do you need help with transportation? No   Do you need help shopping? No   Do you need help preparing meals?  No   Do you need help with housework?  No   Do you need help with laundry? No   Do you need help taking your medications? No   Do you need help managing money? No   Do you ever drive or ride in a car without wearing a seat belt? No   Have you felt unusual stress, anger or loneliness in the last month? No   Who do you live with? Spouse   If you need help, do you have trouble finding someone available to you? No   Have you been bothered in the last four weeks by sexual problems? No   Do you have difficulty concentrating, remembering or making decisions? No       Age-appropriate Screening Schedule:  Refer to the list below for future screening recommendations based on patient's age, sex and/or medical conditions. Orders for these recommended tests are listed in the plan section. The patient has been provided with a written plan.    Health Maintenance   Topic Date Due   • ZOSTER VACCINE (2 of 3) 10/12/2010   • INFLUENZA VACCINE  08/01/2022   • LIPID PANEL  10/27/2022   • TDAP/TD VACCINES (2 - Td or Tdap) 02/01/2023              Assessment/Plan   CMS Preventative Services Quick Reference  Risk Factors Identified During Encounter  Immunizations Discussed/Encouraged (specific Immunizations; Shingrix and COVID19  The above risks/problems have been discussed with the patient.  Follow up actions/plans if indicated are seen below in the Assessment/Plan Section.  Pertinent information has been shared with the patient in the After Visit Summary.    Diagnoses and all orders for this visit:    1. Annual physical exam (Primary)  Assessment & Plan:  He is due for colonoscopy, last done 2021 by Dr. Foote and this showed tubular adenoma x2.  He never did go back to see Dr. Foote last year, so I  have encouraged him to call and ask if Dr. Foote thinks he needs of final colonoscopy. Prostate cancer screening is no longer indicated by age.  He is UTD on COVID (Moderna x2, due for shot #3 - can be done at the pharmacy if he prefers to stick with Moderna), Pneumovax (6/2015), Prevnar (11/2014), Zostavax (8/2010), Tdap (2/2013), and flu (10/2021).  He is due for Shingrix; can be done at the pharmacy. He is due for routine labs including HTN panel; ordered.      2. Essential hypertension  Assessment & Plan:  Blood pressure is running quite elevated today.  He was checking this fairly regularly at home up until April when he started to have a number of acute issues and blood pressure at that time was running in the 130s/60s.  I am going to continue his lisinopril 40 mg daily and metoprolol succinate 25 mg daily.  His pulse is pretty low so he does not have room to increase the beta-blocker but we can add some amlodipine today at 5 mg daily.  I am can have him start checking blood pressure at home again and he will let me know if after a month or so he does not see numbers running at goal range.  He has been under a lot of stress lately, so this may be contributing to his elevation today.  He is completely asymptomatic at this time.    Orders:  -     Comprehensive Metabolic Panel; Future  -     Lipid Panel; Future  -     Discontinue: amLODIPine (NORVASC) 5 MG tablet; Take 1 tablet by mouth Daily.  Dispense: 30 tablet; Refill: 0  -     lisinopril (PRINIVIL,ZESTRIL) 40 MG tablet; Take 1 tablet by mouth Daily.  Dispense: 90 tablet; Refill: 1  -     amLODIPine (NORVASC) 5 MG tablet; Take 1 tablet by mouth Daily.  Dispense: 90 tablet; Refill: 1    3. Coronary artery disease involving native coronary artery of native heart without angina pectoris  Assessment & Plan:  Following with Cardiology and had a stress test back in November that was normal.      4. Mixed hyperlipidemia  Assessment & Plan:  Stable on rosuvastatin  20 mg daily.      5. Gastroesophageal reflux disease without esophagitis    6. Acute pain of right shoulder  -     XR Shoulder 2+ View Right; Future      Follow Up:   Return in about 6 months (around 10/26/2022) for Recheck.     An After Visit Summary and PPPS were made available to the patient.

## 2022-04-26 NOTE — ASSESSMENT & PLAN NOTE
He is due for colonoscopy, last done 2021 by Dr. Foote and this showed tubular adenoma x2.  He never did go back to see Dr. Foote last year, so I have encouraged him to call and ask if Dr. Foote thinks he needs of final colonoscopy. Prostate cancer screening is no longer indicated by age.  He is UTD on COVID (Moderna x2, due for shot #3 - can be done at the pharmacy if he prefers to stick with Moderna), Pneumovax (6/2015), Prevnar (11/2014), Zostavax (8/2010), Tdap (2/2013), and flu (10/2021).  He is due for Shingrix; can be done at the pharmacy. He is due for routine labs including HTN panel; ordered.

## 2022-04-27 ENCOUNTER — TELEPHONE (OUTPATIENT)
Dept: FAMILY MEDICINE CLINIC | Age: 82
End: 2022-04-27

## 2022-04-27 ENCOUNTER — OFFICE VISIT (OUTPATIENT)
Dept: ORTHOPEDIC SURGERY | Facility: CLINIC | Age: 82
End: 2022-04-27

## 2022-04-27 VITALS — HEIGHT: 67 IN | WEIGHT: 181 LBS | BODY MASS INDEX: 28.41 KG/M2

## 2022-04-27 DIAGNOSIS — S42.034A CLOSED NONDISPLACED FRACTURE OF ACROMIAL END OF RIGHT CLAVICLE, INITIAL ENCOUNTER: Primary | ICD-10-CM

## 2022-04-27 DIAGNOSIS — S42.031A CLOSED DISPLACED FRACTURE OF ACROMIAL END OF RIGHT CLAVICLE, INITIAL ENCOUNTER: Primary | ICD-10-CM

## 2022-04-27 PROCEDURE — 99214 OFFICE O/P EST MOD 30 MIN: CPT | Performed by: ORTHOPAEDIC SURGERY

## 2022-04-27 PROCEDURE — 23500 CLTX CLAVICULAR FX W/O MNPJ: CPT | Performed by: ORTHOPAEDIC SURGERY

## 2022-04-27 NOTE — TELEPHONE ENCOUNTER
Caller: Mukesh Beard    Relationship: Self    Best call back number: [phone]    What is the best time to reach you: ANYTIME    Who are you requesting to speak with (clinical staff, provider,  specific staff member): ALICIA       What was the call regarding: PATIENT IS RETURNING CALL TO ALICIA    Do you require a callback: YES

## 2022-05-06 ENCOUNTER — PATIENT ROUNDING (BHMG ONLY) (OUTPATIENT)
Dept: ORTHOPEDIC SURGERY | Facility: CLINIC | Age: 82
End: 2022-05-06

## 2022-05-06 NOTE — PROGRESS NOTES
May 6, 2022    A SendUs Message has been sent to the patient for PATIENT ROUNDING with [facility]

## 2022-05-10 PROBLEM — S42.034A CLOSED NONDISPLACED FRACTURE OF LATERAL END OF RIGHT CLAVICLE: Status: ACTIVE | Noted: 2022-05-10

## 2022-06-07 DIAGNOSIS — S42.034D CLOSED NONDISPLACED FRACTURE OF ACROMIAL END OF RIGHT CLAVICLE WITH ROUTINE HEALING, SUBSEQUENT ENCOUNTER: Primary | ICD-10-CM

## 2022-06-09 ENCOUNTER — OFFICE VISIT (OUTPATIENT)
Dept: ORTHOPEDIC SURGERY | Facility: CLINIC | Age: 82
End: 2022-06-09

## 2022-06-09 ENCOUNTER — HOSPITAL ENCOUNTER (OUTPATIENT)
Dept: GENERAL RADIOLOGY | Facility: HOSPITAL | Age: 82
Discharge: HOME | End: 2022-06-09
Admitting: ORTHOPAEDIC SURGERY

## 2022-06-09 VITALS — WEIGHT: 182 LBS | TEMPERATURE: 97.5 F | HEIGHT: 67 IN | BODY MASS INDEX: 28.56 KG/M2

## 2022-06-09 DIAGNOSIS — S42.034D CLOSED NONDISPLACED FRACTURE OF ACROMIAL END OF RIGHT CLAVICLE WITH ROUTINE HEALING, SUBSEQUENT ENCOUNTER: ICD-10-CM

## 2022-06-09 DIAGNOSIS — S42.034D CLOSED NONDISPLACED FRACTURE OF ACROMIAL END OF RIGHT CLAVICLE WITH ROUTINE HEALING, SUBSEQUENT ENCOUNTER: Primary | ICD-10-CM

## 2022-06-09 PROCEDURE — 99024 POSTOP FOLLOW-UP VISIT: CPT | Performed by: ORTHOPAEDIC SURGERY

## 2022-06-09 PROCEDURE — 73000 X-RAY EXAM OF COLLAR BONE: CPT

## 2022-06-09 NOTE — PROGRESS NOTES
OTHER POST OP GLOBAL     NAME: Mukesh Beard  ?  : 1940  ?  MRN: [id number]    Chief Complaint   Patient presents with   • Right Clavicle - Follow-up     ?  Date of fracture: 2022    HPI:   Patient returns today for 5 week follow up of right clavicle fracture. Right clavicle healed nicely with no signs of infection. Patient reports doing well with no unusual complaints. Appears to be progressing appropriately.  He is clinically doing very well at this point.  He does not have any deformity.  His range of motion is improved and is now near normal.      Ortho Exam:   Right shoulder: Tenderness over the lateral end of the clavicle has settled down quite nicely.  Cap refill is 2 seconds with a brisk return.  Apprehension sign is negative.  Forward flexion is 0 to 90 degrees and active abduction is 0 to 90 degrees.  There is no abnormal mobility at the fracture site consistent with clinical union of the clavicle fracture.      Diagnostic Studies:  right clavicle xrays  weightbearing/standing ap/lateral views were ordered by Naseem Valerio MD. Performed at [facility] on 2022. Images were independently viewed and interpreted by myself, my impression as follows:    right Shoulder X-Ray  Indication: Evaluation of healing of her lateral end of clavicle fracture.  AP and Lateral  Findings: Acceptable position of the fracture fragments with some callus formation noted.  no bony lesion  Soft tissues within normal limits  within normal limits joint spaces  Hardware appropriately positioned not applicable      yes prior studies available for comparison.    X-RAY was ordered and reviewed by Naseem Valerio MD      Assessment:  Diagnoses and all orders for this visit:    1. Closed nondisplaced fracture of acromial end of right clavicle with routine healing, subsequent encounter (Primary)          Plan   • Reinjury precautions discussed with the patient.  • Continue ice as  needed  • Aggressive ROM  • Stretching and strengthening exercises  • Alternate Ibuprofen and Tylenol as needed  • Fall precautions  • Follow up as needed      Date of encounter: 06/09/2022  Naseem Valerio MD

## 2022-06-09 NOTE — PROGRESS NOTES
"Chief Complaint  Follow-up of the Right Clavicle    Subjective    History of Present Illness {CC  Problem List  Visit Diagnosis   Encounters  Notes  Medications  Labs  Result Review Imaging  Media :23}     Mukesh Beard is a 81 y.o. male who presents to [facility] for   History of Present Illness   Pain Location: {AS Body Parts:88515}  Radiation: {asradiationpain:95933}  Quality: {asquality:86931}  Intensity/Severity: {asseveritypain:61798}  Duration: {Time:26168}  Progression of symptoms: {Stucker yes no:77424}  Onset quality: {asonsetquality:06769}  Timing: {astimin}  Aggravating Factors: {AS Aggravating Factors Ortho:10172}  Alleviating Factors: {AS Alleviating Factors:29253}  Previous Episodes: {aspreviousepisodes:36358}  Associated Symptoms: {asassociatedsymptoms:11609}  ADLs Affected: {ADL ASMEH:98808}  Previous Treatment: {AS previous treatment:96036}       Objective   Vital Signs:   Temp 97.5 °F (36.4 °C)   Ht 170.2 cm (67\")   Wt 82.6 kg (182 lb)   BMI 28.51 kg/m²     Physical Exam  Physical Exam  Vitals signs and nursing note reviewed.   Constitutional:       Appearance: Normal appearance.   Pulmonary:      Effort: Pulmonary effort is normal.   Skin:     General: Skin is warm and dry.      Capillary Refill: Capillary refill takes less than 2 seconds.   Neurological:      General: No focal deficit present.      Mental Status: He is alert and oriented to person, place, and time. Mental status is at baseline.   Psychiatric:         Mood and Affect: Mood normal.         Behavior: Behavior normal.         Thought Content: Thought content normal.         Judgment: Judgment normal.     Ortho Exam   {Musculoskeletal Exams:03441}    {Post Op Total Joint Arthroplasty Exam:35210}    {Post Op Detailed Exam:63978}        Result Review :{ Labs  Result Review  Imaging  Med Tab  Media :23}   The following data was reviewed by: Sara Nicholson MA on 2022:  {Data " reviewed (optional):51749}  {Diagnostics options AS:02818}            Procedures           Assessment   Assessment and Plan {CC Problem List  Visit Diagnosis  ROS  Review (Popup)  Health Maintenance  Quality  BestPractice  Medications  SmartSets  SnapShot Encounters  Media :23}   There are no diagnoses linked to this encounter.    {Time Spent (Optional):81645}  Follow Up {Instructions Charge Capture  Follow-up Communications :23}  · Compression/brace  · Rest, ice, compression, and elevation (RICE) therapy  · Stretching and strengthening exercises  · OTC {OTC pain:77010}  · Follow up in *** {WEEKS/MONTHS:20171}  • Patient was given instructions and counseling regarding his condition or for health maintenance advice. Please see specific information pulled into the AVS if appropriate.     Sara Nicholson MA   Date of Encounter: 6/9/2022   Electronically signed by Sara Nicholson MA, 06/09/22, 9:22 AM EDT.     EMR Dragon/Transcription disclaimer:  Much of this encounter note is an electronic transcription/translation of spoken language to printed text. The electronic translation of spoken language may permit erroneous, or at times, nonsensical words or phrases to be inadvertently transcribed; Although I have reviewed the note for such errors, some may still exist.

## 2022-06-13 RX ORDER — METOPROLOL SUCCINATE 25 MG/1
TABLET, EXTENDED RELEASE ORAL
Qty: 45 TABLET | Refills: 1 | Status: SHIPPED | OUTPATIENT
Start: 2022-06-13 | End: 2022-10-27 | Stop reason: SDUPTHER

## 2022-07-07 DIAGNOSIS — K21.9 GASTROESOPHAGEAL REFLUX DISEASE WITHOUT ESOPHAGITIS: ICD-10-CM

## 2022-07-08 RX ORDER — FAMOTIDINE 40 MG/1
TABLET, FILM COATED ORAL
Qty: 90 TABLET | Refills: 1 | Status: SHIPPED | OUTPATIENT
Start: 2022-07-08 | End: 2022-10-27 | Stop reason: SDUPTHER

## 2022-07-08 RX ORDER — CARBAMAZEPINE 100 MG/1
TABLET, EXTENDED RELEASE ORAL
Qty: 180 TABLET | Refills: 1 | Status: SHIPPED | OUTPATIENT
Start: 2022-07-08 | End: 2022-10-27 | Stop reason: SDUPTHER

## 2022-09-06 RX ORDER — ROSUVASTATIN CALCIUM 20 MG/1
TABLET, COATED ORAL
Qty: 90 TABLET | Refills: 1 | Status: SHIPPED | OUTPATIENT
Start: 2022-09-06 | End: 2022-10-27 | Stop reason: SDUPTHER

## 2022-10-27 ENCOUNTER — OFFICE VISIT (OUTPATIENT)
Dept: FAMILY MEDICINE CLINIC | Age: 82
End: 2022-10-27

## 2022-10-27 ENCOUNTER — LAB (OUTPATIENT)
Dept: LAB | Facility: HOSPITAL | Age: 82
End: 2022-10-27

## 2022-10-27 ENCOUNTER — TELEPHONE (OUTPATIENT)
Dept: FAMILY MEDICINE CLINIC | Age: 82
End: 2022-10-27

## 2022-10-27 VITALS
BODY MASS INDEX: 29.29 KG/M2 | HEIGHT: 67 IN | DIASTOLIC BLOOD PRESSURE: 81 MMHG | WEIGHT: 186.6 LBS | HEART RATE: 47 BPM | SYSTOLIC BLOOD PRESSURE: 168 MMHG

## 2022-10-27 DIAGNOSIS — H40.9 GLAUCOMA OF BOTH EYES, UNSPECIFIED GLAUCOMA TYPE: ICD-10-CM

## 2022-10-27 DIAGNOSIS — G50.0 TRIGEMINAL NEURALGIA OF LEFT SIDE OF FACE: ICD-10-CM

## 2022-10-27 DIAGNOSIS — I25.10 CORONARY ARTERY DISEASE INVOLVING NATIVE CORONARY ARTERY OF NATIVE HEART WITHOUT ANGINA PECTORIS: ICD-10-CM

## 2022-10-27 DIAGNOSIS — I10 ESSENTIAL HYPERTENSION: ICD-10-CM

## 2022-10-27 DIAGNOSIS — I10 ESSENTIAL HYPERTENSION: Primary | ICD-10-CM

## 2022-10-27 DIAGNOSIS — K21.9 GASTROESOPHAGEAL REFLUX DISEASE WITHOUT ESOPHAGITIS: ICD-10-CM

## 2022-10-27 DIAGNOSIS — G89.29 CHRONIC BILATERAL LOW BACK PAIN WITHOUT SCIATICA: ICD-10-CM

## 2022-10-27 DIAGNOSIS — E78.2 MIXED HYPERLIPIDEMIA: ICD-10-CM

## 2022-10-27 DIAGNOSIS — M54.50 CHRONIC BILATERAL LOW BACK PAIN WITHOUT SCIATICA: ICD-10-CM

## 2022-10-27 PROBLEM — Z00.00 ANNUAL PHYSICAL EXAM: Status: RESOLVED | Noted: 2022-04-26 | Resolved: 2022-10-27

## 2022-10-27 PROBLEM — S42.034A CLOSED NONDISPLACED FRACTURE OF LATERAL END OF RIGHT CLAVICLE: Status: RESOLVED | Noted: 2022-05-10 | Resolved: 2022-10-27

## 2022-10-27 PROBLEM — M12.819 ROTATOR CUFF ARTHROPATHY: Status: RESOLVED | Noted: 2018-04-12 | Resolved: 2022-10-27

## 2022-10-27 PROBLEM — M75.122 COMPLETE TEAR OF LEFT ROTATOR CUFF: Status: RESOLVED | Noted: 2018-03-26 | Resolved: 2022-10-27

## 2022-10-27 PROBLEM — N20.1 URETERAL STONE: Status: RESOLVED | Noted: 2022-03-30 | Resolved: 2022-10-27

## 2022-10-27 LAB
CHOLEST SERPL-MCNC: 111 MG/DL (ref 0–200)
HDLC SERPL-MCNC: 40 MG/DL (ref 40–60)
LDLC SERPL CALC-MCNC: 47 MG/DL (ref 0–100)
LDLC/HDLC SERPL: 1.09 {RATIO}
TRIGL SERPL-MCNC: 138 MG/DL (ref 0–150)
VLDLC SERPL-MCNC: 24 MG/DL (ref 5–40)

## 2022-10-27 PROCEDURE — 80061 LIPID PANEL: CPT

## 2022-10-27 PROCEDURE — 36415 COLL VENOUS BLD VENIPUNCTURE: CPT

## 2022-10-27 PROCEDURE — 99214 OFFICE O/P EST MOD 30 MIN: CPT | Performed by: FAMILY MEDICINE

## 2022-10-27 RX ORDER — MELOXICAM 15 MG/1
15 TABLET ORAL DAILY PRN
Qty: 90 TABLET | Refills: 1 | Status: CANCELLED | OUTPATIENT
Start: 2022-10-27

## 2022-10-27 RX ORDER — MELOXICAM 15 MG/1
15 TABLET ORAL DAILY PRN
Qty: 90 TABLET | Refills: 1 | Status: SHIPPED | OUTPATIENT
Start: 2022-10-27 | End: 2022-11-18 | Stop reason: SDUPTHER

## 2022-10-27 RX ORDER — AMLODIPINE BESYLATE 5 MG/1
5 TABLET ORAL DAILY
Qty: 90 TABLET | Refills: 1 | Status: CANCELLED | OUTPATIENT
Start: 2022-10-27

## 2022-10-27 RX ORDER — AMLODIPINE BESYLATE 5 MG/1
5 TABLET ORAL DAILY
Qty: 90 TABLET | Refills: 1 | Status: SHIPPED | OUTPATIENT
Start: 2022-10-27 | End: 2022-11-18 | Stop reason: SDUPTHER

## 2022-10-27 RX ORDER — LISINOPRIL 40 MG/1
40 TABLET ORAL DAILY
Qty: 90 TABLET | Refills: 1 | Status: SHIPPED | OUTPATIENT
Start: 2022-10-27 | End: 2022-11-07

## 2022-10-27 RX ORDER — LISINOPRIL 40 MG/1
40 TABLET ORAL DAILY
Qty: 90 TABLET | Refills: 1 | Status: CANCELLED | OUTPATIENT
Start: 2022-10-27

## 2022-10-27 NOTE — ASSESSMENT & PLAN NOTE
BP at home has been running at goal, so no adjustments made to meds today.  He does have significant white coat HTN.  Continue amlodipine 5 mg daily, lisinopril 40 mg daily and metoprolol succinate 25 mg daily.  Refills senta s below.  Checking labs.

## 2022-10-27 NOTE — PROGRESS NOTES
Chief Complaint  Hypertension (6 month follow up)    Subjective          Mukesh Beard presents to [facility] today for f/u of routine issues.    He is on amlodipine, lisinopril and metoprolol succinate for  HTN.  Blood pressure has been well controlled at home.  Running 130s/80s.  He denies chest pain, palpitations, or shortness of breath.  He is on rosuvastatin for HLD and CAD.  No myalgias.  H/o CAD, s/p 5 v. CABG.  Following with Cardiology and goes back to see them next month.  He had a stress test about a year ago which was normal.        He is on meloxicam for chronic pain in the shoulder and back due to arthritis.  He fractured his right collarbone earlier this summer and saw Dr. Valerio for that.  He does have some residual pain.      He is on Singulair and cetirizine for allergies.      He is on carbamazepine for trigeminal neuralgia of the left face.  His symptoms have been well controlled since starting this.     He is following with Roseann Ophtho for glaucoma.  He has had some procedures done on both eyes now, and that seems to have improved the pressure.  However, at his last visit, the pressure had increased again so he was put back on the Cosopt drops.  They are planning a repeat of some of those procedures next year.      Current Outpatient Medications:   •  amLODIPine (NORVASC) 5 MG tablet, Take 1 tablet by mouth Daily., Disp: 90 tablet, Rfl: 1  •  aspirin 81 MG tablet, Take 1 tablet by mouth Every Night. Resume 4/14/18, Disp: , Rfl:   •  carBAMazepine XR (TEGretol  XR) 100 MG 12 hr tablet, TAKE 1 TABLET TWICE A DAY, Disp: 180 tablet, Rfl: 1  •  cetirizine (zyrTEC) 10 MG tablet, Take 10 mg by mouth Daily., Disp: , Rfl:   •  Cholecalciferol (VITAMIN D3) 2000 units capsule, Take 2,000 Units by mouth Every Night., Disp: , Rfl:   •  Cinnamon 500 MG capsule, Take 500 mg by mouth Every Morning. 2000mg qd, Disp: , Rfl:   •  famotidine (PEPCID) 40 MG tablet,  "TAKE 1 TABLET DAILY, Disp: 90 tablet, Rfl: 1  •  lisinopril (PRINIVIL,ZESTRIL) 40 MG tablet, Take 1 tablet by mouth Daily., Disp: 90 tablet, Rfl: 1  •  meloxicam (MOBIC) 15 MG tablet, Take 1 tablet by mouth Daily As Needed for Moderate Pain., Disp: 90 tablet, Rfl: 1  •  metoprolol succinate XL (TOPROL-XL) 25 MG 24 hr tablet, TAKE 1/2 TABLET DAILY, Disp: 45 tablet, Rfl: 1  •  montelukast (SINGULAIR) 10 MG tablet, Take 10 mg by mouth Every Night., Disp: , Rfl:   •  niacin 500 MG tablet, Take 1,000 mg by mouth Every Night., Disp: , Rfl:   •  rosuvastatin (CRESTOR) 20 MG tablet, TAKE 1 TABLET NIGHTLY, Disp: 90 tablet, Rfl: 1    Allergies:  Benzalkonium      Objective   Vital Signs:   Vitals:    10/27/22 0902   BP: 178/94   BP Location: Left arm   Patient Position: Sitting   Pulse: 51   Weight: 84.6 kg (186 lb 9.6 oz)   Height: 170.2 cm (67.01\")       Physical Exam  Vitals reviewed.   Constitutional:       General: He is not in acute distress.     Appearance: Normal appearance. He is well-developed.   HENT:      Head: Normocephalic and atraumatic.      Right Ear: External ear normal.      Left Ear: External ear normal.   Eyes:      Extraocular Movements: Extraocular movements intact.      Conjunctiva/sclera: Conjunctivae normal.      Pupils: Pupils are equal, round, and reactive to light.   Cardiovascular:      Rate and Rhythm: Normal rate and regular rhythm.      Heart sounds: No murmur heard.  Pulmonary:      Effort: Pulmonary effort is normal.      Breath sounds: Normal breath sounds. No wheezing, rhonchi or rales.   Abdominal:      General: Bowel sounds are normal. There is no distension.      Palpations: Abdomen is soft.      Tenderness: There is no abdominal tenderness.   Musculoskeletal:         General: Normal range of motion.   Neurological:      Mental Status: He is alert.   Psychiatric:         Mood and Affect: Affect normal.          Lab Results   Component Value Date    GLUCOSE 110 (H) 04/26/2022    BUN 13 " 04/26/2022    CREATININE 0.87 04/26/2022    EGFRIFNONA 81 10/27/2021    BCR 14.9 04/26/2022    K 4.8 04/26/2022    CO2 26.9 04/26/2022    CALCIUM 9.4 04/26/2022    ALBUMIN 4.50 04/26/2022    LABIL2 1.9 04/23/2021    AST 22 04/26/2022    ALT 30 04/26/2022       Lab Results   Component Value Date    CHOL 115 04/26/2022    CHLPL 108 04/23/2021    TRIG 127 04/26/2022    HDL 37 (L) 04/26/2022    LDL 55 04/26/2022            Assessment and Plan    Diagnoses and all orders for this visit:    1. Essential hypertension (Primary)  Assessment & Plan:  BP at home has been running at goal, so no adjustments made to meds today.  He does have significant white coat HTN.  Continue amlodipine 5 mg daily, lisinopril 40 mg daily and metoprolol succinate 25 mg daily.  Refills senta s below.  Checking labs.    Orders:  -     Comprehensive Metabolic Panel; Future  -     Lipid Panel; Future  -     amLODIPine (NORVASC) 5 MG tablet; Take 1 tablet by mouth Daily.  Dispense: 90 tablet; Refill: 1  -     lisinopril (PRINIVIL,ZESTRIL) 40 MG tablet; Take 1 tablet by mouth Daily.  Dispense: 90 tablet; Refill: 1    2. Mixed hyperlipidemia  Assessment & Plan:  Stable on rosuvastatin 20 mg daily.  No refills needed.  Checking labs.      3. Coronary artery disease involving native coronary artery of native heart without angina pectoris  Assessment & Plan:  Stable on ASA, ACEI and BB.  No refills needed.  Following with Cardiology.      4. Trigeminal neuralgia of left side of face  Assessment & Plan:  Sx well controlled on carbamazepine 100 mg BID.  No refills needed today.  Continue to monitor.      5. Chronic bilateral low back pain without sciatica  Assessment & Plan:  Stable on meloxicam.  Refills sent as below.    Orders:  -     meloxicam (MOBIC) 15 MG tablet; Take 1 tablet by mouth Daily As Needed for Moderate Pain.  Dispense: 90 tablet; Refill: 1    6. Glaucoma of both eyes, unspecified glaucoma type  Overview:  Following with Steven and  [facility].        Follow Up   Return in about 6 months (around 4/27/2023) for Medicare Wellness.  Patient was given instructions and counseling regarding his condition or for health maintenance advice. Please see specific information pulled into the AVS if appropriate.

## 2022-10-27 NOTE — TELEPHONE ENCOUNTER
Caller: Mukesh Beard    Relationship: Self    Best call back number: [phone]    Requested Prescriptions:   Requested Prescriptions     Pending Prescriptions Disp Refills   • amLODIPine (NORVASC) 5 MG tablet 90 tablet 1     Sig: Take 1 tablet by mouth Daily.   • carBAMazepine XR (TEGretol  XR) 100 MG 12 hr tablet 180 tablet 1     Sig: Take 1 tablet by mouth 2 (Two) Times a Day.   • cetirizine (zyrTEC) 10 MG tablet       Sig: Take 1 tablet by mouth Daily.   • Cholecalciferol (Vitamin D3) 50 MCG (2000 UT) capsule       Sig: Take 1 capsule by mouth Every Night.   • Cinnamon 500 MG capsule       Sig: Take 1 capsule by mouth Every Morning. 2000mg qd   • famotidine (PEPCID) 40 MG tablet 90 tablet 1     Sig: Take 1 tablet by mouth Daily.   • lisinopril (PRINIVIL,ZESTRIL) 40 MG tablet 90 tablet 1     Sig: Take 1 tablet by mouth Daily.   • meloxicam (MOBIC) 15 MG tablet 90 tablet 1     Sig: Take 1 tablet by mouth Daily As Needed for Moderate Pain.   • metoprolol succinate XL (TOPROL-XL) 25 MG 24 hr tablet 45 tablet 1     Sig: Take 0.5 tablets by mouth Daily.   • montelukast (SINGULAIR) 10 MG tablet       Sig: Take 1 tablet by mouth Every Night.   • niacin 500 MG tablet       Sig: Take 2 tablets by mouth Every Night.   • rosuvastatin (CRESTOR) 20 MG tablet 90 tablet 1     Sig: Take 1 tablet by mouth Every Night.        Pharmacy where request should be sent: [facility] [place], [place] - [address] AT PORTAL TO REGISTERED [facility] - [phone]  - [phone] FX     Additional details provided by patient: PATIENT WANTS 90 SUPPLY SENT TO [facility]    Does the patient have less than a 3 day supply:  [x] Yes  [] No    Amy Michaels Rep   10/27/22 15:05 EDT

## 2022-10-28 RX ORDER — CARBAMAZEPINE 100 MG/1
100 TABLET, EXTENDED RELEASE ORAL 2 TIMES DAILY
Qty: 180 TABLET | Refills: 1 | Status: SHIPPED | OUTPATIENT
Start: 2022-10-28

## 2022-10-28 RX ORDER — AMPICILLIN TRIHYDRATE 250 MG
500 CAPSULE ORAL EVERY MORNING
Qty: 90 CAPSULE | Refills: 3 | Status: SHIPPED | OUTPATIENT
Start: 2022-10-28 | End: 2022-11-03 | Stop reason: SDUPTHER

## 2022-10-28 RX ORDER — MONTELUKAST SODIUM 10 MG/1
10 TABLET ORAL NIGHTLY
Qty: 90 TABLET | Refills: 3 | Status: SHIPPED | OUTPATIENT
Start: 2022-10-28

## 2022-10-28 RX ORDER — ACETAMINOPHEN 160 MG
2000 TABLET,DISINTEGRATING ORAL NIGHTLY
Qty: 90 CAPSULE | Refills: 3 | Status: SHIPPED | OUTPATIENT
Start: 2022-10-28

## 2022-10-28 RX ORDER — CETIRIZINE HYDROCHLORIDE 10 MG/1
10 TABLET ORAL DAILY
Qty: 90 TABLET | Refills: 3 | Status: SHIPPED | OUTPATIENT
Start: 2022-10-28

## 2022-10-28 RX ORDER — ROSUVASTATIN CALCIUM 20 MG/1
20 TABLET, COATED ORAL NIGHTLY
Qty: 90 TABLET | Refills: 1 | Status: SHIPPED | OUTPATIENT
Start: 2022-10-28

## 2022-10-28 RX ORDER — NIACIN 500 MG
1000 TABLET ORAL NIGHTLY
Qty: 180 TABLET | Refills: 3 | Status: SHIPPED | OUTPATIENT
Start: 2022-10-28

## 2022-10-28 RX ORDER — FAMOTIDINE 40 MG/1
40 TABLET, FILM COATED ORAL DAILY
Qty: 90 TABLET | Refills: 1 | Status: SHIPPED | OUTPATIENT
Start: 2022-10-28

## 2022-10-28 RX ORDER — METOPROLOL SUCCINATE 25 MG/1
12.5 TABLET, EXTENDED RELEASE ORAL DAILY
Qty: 45 TABLET | Refills: 1 | Status: SHIPPED | OUTPATIENT
Start: 2022-10-28

## 2022-11-03 ENCOUNTER — TELEPHONE (OUTPATIENT)
Dept: FAMILY MEDICINE CLINIC | Age: 82
End: 2022-11-03

## 2022-11-03 RX ORDER — AMPICILLIN TRIHYDRATE 250 MG
500 CAPSULE ORAL EVERY MORNING
Qty: 90 CAPSULE | Refills: 3 | Status: SHIPPED | OUTPATIENT
Start: 2022-11-03

## 2022-11-03 NOTE — TELEPHONE ENCOUNTER
Pharm called questioning the cinnamon rx.  The sig say two different things.  Please fix rx and re send.  Thanks.

## 2022-11-06 DIAGNOSIS — I10 ESSENTIAL HYPERTENSION: ICD-10-CM

## 2022-11-07 RX ORDER — LISINOPRIL 40 MG/1
TABLET ORAL
Qty: 90 TABLET | Refills: 1 | Status: SHIPPED | OUTPATIENT
Start: 2022-11-07

## 2022-11-08 DIAGNOSIS — M54.50 CHRONIC BILATERAL LOW BACK PAIN WITHOUT SCIATICA: ICD-10-CM

## 2022-11-08 DIAGNOSIS — G89.29 CHRONIC BILATERAL LOW BACK PAIN WITHOUT SCIATICA: ICD-10-CM

## 2022-11-08 DIAGNOSIS — I10 ESSENTIAL HYPERTENSION: ICD-10-CM

## 2022-11-08 RX ORDER — MELOXICAM 15 MG/1
TABLET ORAL
Qty: 90 TABLET | Refills: 1 | OUTPATIENT
Start: 2022-11-08

## 2022-11-08 RX ORDER — AMLODIPINE BESYLATE 5 MG/1
TABLET ORAL
Qty: 90 TABLET | Refills: 1 | OUTPATIENT
Start: 2022-11-08

## 2022-11-18 ENCOUNTER — TELEPHONE (OUTPATIENT)
Dept: FAMILY MEDICINE CLINIC | Age: 82
End: 2022-11-18

## 2022-11-18 DIAGNOSIS — I10 ESSENTIAL HYPERTENSION: ICD-10-CM

## 2022-11-18 DIAGNOSIS — G89.29 CHRONIC BILATERAL LOW BACK PAIN WITHOUT SCIATICA: ICD-10-CM

## 2022-11-18 DIAGNOSIS — M54.50 CHRONIC BILATERAL LOW BACK PAIN WITHOUT SCIATICA: ICD-10-CM

## 2022-11-18 RX ORDER — AMLODIPINE BESYLATE 5 MG/1
5 TABLET ORAL DAILY
Qty: 90 TABLET | Refills: 1 | Status: SHIPPED | OUTPATIENT
Start: 2022-11-18

## 2022-11-18 RX ORDER — MELOXICAM 15 MG/1
15 TABLET ORAL DAILY PRN
Qty: 90 TABLET | Refills: 1 | Status: SHIPPED | OUTPATIENT
Start: 2022-11-18

## 2022-11-18 NOTE — TELEPHONE ENCOUNTER
Caller: Mukesh Beard    Relationship: Self    Best call back number: [phone]    What is the best time to reach you: ANY TIME     Who are you requesting to speak with (clinical staff, provider,  specific staff member): ALICIA SERRATO    Do you require a callback: YES, PATIENT CALLED BECAUSE HE IS HAVING PROBLEMS WITH HIS MEDICATIONS:    amLODIPine (NORVASC) 5 MG tablet       meloxicam (MOBIC) 15 MG tablet     THEY [facility] AND SHOULD HAVE BEEN SENT TO TO   .           [facility] - [facility] - [address] [facility] AT Portal to [facility] Sites - [phone]  - [phone] FX      THEY WERE RESENT AND HAVE BEEN DENIED BY THE DOCTOR AS DUPLICATE.  PLEASE CALL AND HELP HIM GET THIS ISSUE RESOLVED

## 2022-11-29 ENCOUNTER — OFFICE VISIT (OUTPATIENT)
Dept: CARDIOLOGY | Facility: CLINIC | Age: 82
End: 2022-11-29

## 2022-11-29 VITALS
HEART RATE: 54 BPM | WEIGHT: 186.2 LBS | SYSTOLIC BLOOD PRESSURE: 160 MMHG | BODY MASS INDEX: 29.22 KG/M2 | DIASTOLIC BLOOD PRESSURE: 82 MMHG | HEIGHT: 67 IN

## 2022-11-29 DIAGNOSIS — I45.10 RBBB: ICD-10-CM

## 2022-11-29 DIAGNOSIS — I65.22 CAROTID STENOSIS, ASYMPTOMATIC, LEFT: ICD-10-CM

## 2022-11-29 DIAGNOSIS — I25.810 CORONARY ARTERY DISEASE INVOLVING CORONARY BYPASS GRAFT OF NATIVE HEART WITHOUT ANGINA PECTORIS: Primary | ICD-10-CM

## 2022-11-29 PROCEDURE — 93000 ELECTROCARDIOGRAM COMPLETE: CPT | Performed by: INTERNAL MEDICINE

## 2022-11-29 PROCEDURE — 99214 OFFICE O/P EST MOD 30 MIN: CPT | Performed by: INTERNAL MEDICINE

## 2022-11-29 RX ORDER — DORZOLAMIDE HYDROCHLORIDE AND TIMOLOL MALEATE 20; 5 MG/ML; MG/ML
1 SOLUTION/ DROPS OPHTHALMIC 2 TIMES DAILY
COMMUNITY

## 2023-05-02 ENCOUNTER — OFFICE VISIT (OUTPATIENT)
Dept: FAMILY MEDICINE CLINIC | Age: 83
End: 2023-05-02
Payer: MEDICARE

## 2023-05-02 VITALS
SYSTOLIC BLOOD PRESSURE: 163 MMHG | HEART RATE: 48 BPM | BODY MASS INDEX: 29.35 KG/M2 | WEIGHT: 187 LBS | HEIGHT: 67 IN | OXYGEN SATURATION: 97 % | DIASTOLIC BLOOD PRESSURE: 84 MMHG

## 2023-05-02 DIAGNOSIS — K21.9 GASTROESOPHAGEAL REFLUX DISEASE WITHOUT ESOPHAGITIS: ICD-10-CM

## 2023-05-02 DIAGNOSIS — M54.50 CHRONIC BILATERAL LOW BACK PAIN WITHOUT SCIATICA: ICD-10-CM

## 2023-05-02 DIAGNOSIS — I25.10 CORONARY ARTERY DISEASE INVOLVING NATIVE CORONARY ARTERY OF NATIVE HEART WITHOUT ANGINA PECTORIS: ICD-10-CM

## 2023-05-02 DIAGNOSIS — E78.2 MIXED HYPERLIPIDEMIA: ICD-10-CM

## 2023-05-02 DIAGNOSIS — Z00.00 ANNUAL PHYSICAL EXAM: Primary | ICD-10-CM

## 2023-05-02 DIAGNOSIS — I10 ESSENTIAL HYPERTENSION: ICD-10-CM

## 2023-05-02 DIAGNOSIS — G50.0 TRIGEMINAL NEURALGIA OF LEFT SIDE OF FACE: ICD-10-CM

## 2023-05-02 DIAGNOSIS — G89.29 CHRONIC BILATERAL LOW BACK PAIN WITHOUT SCIATICA: ICD-10-CM

## 2023-05-02 RX ORDER — METOPROLOL SUCCINATE 25 MG/1
12.5 TABLET, EXTENDED RELEASE ORAL DAILY
Qty: 45 TABLET | Refills: 1 | Status: SHIPPED | OUTPATIENT
Start: 2023-05-02

## 2023-05-02 RX ORDER — CARBAMAZEPINE 100 MG/1
100 TABLET, EXTENDED RELEASE ORAL 2 TIMES DAILY
Qty: 180 TABLET | Refills: 1 | Status: SHIPPED | OUTPATIENT
Start: 2023-05-02

## 2023-05-02 RX ORDER — AMLODIPINE BESYLATE 5 MG/1
5 TABLET ORAL DAILY
Qty: 90 TABLET | Refills: 1 | Status: SHIPPED | OUTPATIENT
Start: 2023-05-02

## 2023-05-02 RX ORDER — LISINOPRIL 40 MG/1
40 TABLET ORAL DAILY
Qty: 90 TABLET | Refills: 1 | Status: SHIPPED | OUTPATIENT
Start: 2023-05-02

## 2023-05-02 RX ORDER — FAMOTIDINE 40 MG/1
40 TABLET, FILM COATED ORAL DAILY
Qty: 90 TABLET | Refills: 1 | Status: SHIPPED | OUTPATIENT
Start: 2023-05-02

## 2023-05-02 RX ORDER — ROSUVASTATIN CALCIUM 20 MG/1
20 TABLET, COATED ORAL NIGHTLY
Qty: 90 TABLET | Refills: 1 | Status: SHIPPED | OUTPATIENT
Start: 2023-05-02

## 2023-05-02 RX ORDER — MELOXICAM 15 MG/1
15 TABLET ORAL DAILY PRN
Qty: 90 TABLET | Refills: 1 | Status: SHIPPED | OUTPATIENT
Start: 2023-05-02

## 2023-05-02 NOTE — ASSESSMENT & PLAN NOTE
Colonoscopy is no longer indicated by age and history.  Prostate cancer screening is no longer indicated by age.  He is UTD on COVID, Pneumovax (6/2015), Bkgpebd76 (11/2014), Zostavax (8/2010), Shingrix (4/6/2023, going back for shot #2 in a couple of months) and flu (10/2022).  He is due for Tdap (2/2013).  Tdap can be done at the pharmacy.  He is due for routine labs including HTN panel; ordered.

## 2023-05-02 NOTE — PROGRESS NOTES
The ABCs of the Annual Wellness Visit  Subsequent Medicare Wellness Visit    Subjective    Mukesh Beard is a 82 y.o. male who presents for a Subsequent Medicare Wellness Visit.    Colonoscopy is no longer indicated by age and history.  Prostate cancer screening is no longer indicated by age.  He is UTD on COVID, Pneumovax (6/2015), Wsztout19 (11/2014), Zostavax (8/2010), Shingrix (4/6/2023, going back for shot #2 in a couple of months) and flu (10/2022).  He is due for Tdap (2/2013).  He is due for routine labs including HTN panel.      The following portions of the patient's history were reviewed and   updated as appropriate: allergies, current medications, past family history, past medical history, past social history, past surgical history and problem list.    Compared to one year ago, the patient feels his physical   health is the same.    Compared to one year ago, the patient feels his mental   health is the same.    Recent Hospitalizations:  He was not admitted to the hospital during the last year.       Current Medical Providers:  Patient Care Team:  Alexandra Barnes MD as PCP - General (Family Medicine)  Elian Rizo MD as Consulting Physician (Cardiology)  Shanta Braun MD as Consulting Physician (Urology)    Outpatient Medications Prior to Visit   Medication Sig Dispense Refill   • aspirin 81 MG tablet Take 1 tablet by mouth Every Night. Resume 4/14/18     • cetirizine (zyrTEC) 10 MG tablet Take 1 tablet by mouth Daily. 90 tablet 3   • Cholecalciferol (Vitamin D3) 50 MCG (2000 UT) capsule Take 1 capsule by mouth Every Night. 90 capsule 3   • dorzolamide-timolol (COSOPT) 22.3-6.8 MG/ML ophthalmic solution 1 drop 2 (Two) Times a Day.     • montelukast (SINGULAIR) 10 MG tablet Take 1 tablet by mouth Every Night. 90 tablet 3   • niacin 500 MG tablet Take 2 tablets by mouth Every Night. 180 tablet 3   • amLODIPine (NORVASC) 5 MG tablet Take 1 tablet by mouth Daily. 90 tablet 1   •  carBAMazepine XR (TEGretol  XR) 100 MG 12 hr tablet Take 1 tablet by mouth 2 (Two) Times a Day. 180 tablet 1   • famotidine (PEPCID) 40 MG tablet Take 1 tablet by mouth Daily. 90 tablet 1   • lisinopril (PRINIVIL,ZESTRIL) 40 MG tablet TAKE 1 TABLET DAILY 90 tablet 1   • meloxicam (MOBIC) 15 MG tablet Take 1 tablet by mouth Daily As Needed for Moderate Pain. 90 tablet 1   • metoprolol succinate XL (TOPROL-XL) 25 MG 24 hr tablet Take 0.5 tablets by mouth Daily. 45 tablet 1   • rosuvastatin (CRESTOR) 20 MG tablet Take 1 tablet by mouth Every Night. 90 tablet 1   • Cinnamon 500 MG capsule Take 1 capsule by mouth Every Morning. 90 capsule 3     No facility-administered medications prior to visit.       No opioid medication identified on active medication list. I have reviewed chart for other potential  high risk medication/s and harmful drug interactions in the elderly.          Aspirin is on active medication list. Aspirin use is indicated based on review of current medical condition/s. Pros and cons of this therapy have been discussed today. Benefits of this medication outweigh potential harm.  Patient has been encouraged to continue taking this medication.  .      Patient Active Problem List   Diagnosis   • Essential hypertension   • Coronary artery disease involving native coronary artery of native heart without angina pectoris   • Bilateral primary osteoarthritis of knee   • Mixed hyperlipidemia   • Trigeminal neuralgia of left side of face   • Gastroesophageal reflux disease without esophagitis   • Chronic bilateral low back pain without sciatica   • Annual physical exam   • Glaucoma     Advance Care Planning   Advance Care Planning     Advance Directive is not on file.  ACP discussion was held with the patient during this visit. Patient does not have an advance directive, information provided.     Objective    Vitals:    05/02/23 0821 05/02/23 0906   BP: 148/83 163/84   BP Location: Left arm Left arm   Patient  "Position: Sitting Sitting   Cuff Size: Adult Adult   Pulse: (!) 48 (!) 48   SpO2: 97%    Weight: 84.8 kg (187 lb)    Height: 170.2 cm (67.01\")      Estimated body mass index is 29.28 kg/m² as calculated from the following:    Height as of this encounter: 170.2 cm (67.01\").    Weight as of this encounter: 84.8 kg (187 lb).    BMI is >= 25 and <30. (Overweight) The following options were offered after discussion;: exercise counseling/recommendations and nutrition counseling/recommendations      Does the patient have evidence of cognitive impairment? No          HEALTH RISK ASSESSMENT    Smoking Status:  Social History     Tobacco Use   Smoking Status Former   • Packs/day: 1.00   • Years: 10.00   • Pack years: 10.00   • Types: Cigarettes   • Quit date: 1972   • Years since quittin.3   Smokeless Tobacco Never   Tobacco Comments    caffeine use-coffee     Alcohol Consumption:  Social History     Substance and Sexual Activity   Alcohol Use Yes    Comment: RARE     Fall Risk Screen:    STEADI Fall Risk Assessment was completed, and patient is at MODERATE risk for falls. Assessment completed on:2023    Depression Screenin/2/2023     8:24 AM   PHQ-2/PHQ-9 Depression Screening   Little Interest or Pleasure in Doing Things 0-->not at all   Feeling Down, Depressed or Hopeless 0-->not at all   PHQ-9: Brief Depression Severity Measure Score 0       Health Habits and Functional and Cognitive Screenin/2/2023     8:25 AM   Functional & Cognitive Status   Do you have difficulty preparing food and eating? No   Do you have difficulty bathing yourself, getting dressed or grooming yourself? No   Do you have difficulty using the toilet? No   Do you have difficulty moving around from place to place? No   Do you have trouble with steps or getting out of a bed or a chair? No   Current Diet Unhealthy Diet   Dental Exam Up to date   Eye Exam Up to date   Exercise (times per week) 7 times per week   Current " Exercises Include Walking;Other   Do you need help using the phone?  No   Are you deaf or do you have serious difficulty hearing?  No   Do you need help with transportation? No   Do you need help shopping? No   Do you need help preparing meals?  No   Do you need help with housework?  No   Do you need help with laundry? No   Do you need help taking your medications? No   Do you need help managing money? No   Do you ever drive or ride in a car without wearing a seat belt? No   Have you felt unusual stress, anger or loneliness in the last month? No   Who do you live with? Spouse   If you need help, do you have trouble finding someone available to you? No   Have you been bothered in the last four weeks by sexual problems? No   Do you have difficulty concentrating, remembering or making decisions? No       Age-appropriate Screening Schedule:  Refer to the list below for future screening recommendations based on patient's age, sex and/or medical conditions. Orders for these recommended tests are listed in the plan section. The patient has been provided with a written plan.    Health Maintenance   Topic Date Due   • TDAP/TD VACCINES (2 - Td or Tdap) 02/01/2023   • ZOSTER VACCINE (3 of 3) 06/21/2023   • INFLUENZA VACCINE  08/01/2023   • LIPID PANEL  10/27/2023   • ANNUAL WELLNESS VISIT  05/02/2024   • COVID-19 Vaccine  Completed   • Pneumococcal Vaccine 65+  Completed                  CMS Preventative Services Quick Reference  Risk Factors Identified During Encounter  Immunizations Discussed/Encouraged: Tdap  The above risks/problems have been discussed with the patient.  Pertinent information has been shared with the patient in the After Visit Summary.  An After Visit Summary and PPPS were made available to the patient.    Follow Up:   Next Medicare Wellness visit to be scheduled in 1 year.       Additional E&M Note during same encounter follows:  Patient has multiple medical problems which are significant and separately  "identifiable that require additional work above and beyond the Medicare Wellness Visit.      Chief Complaint  Medicare Wellness-subsequent    Subjective        HPI  Mukesh Beard is also being seen today for f/u of routine problems.    He is on amlodipine, metoprolol succinate and lisinopril for hypertension.  His BP has been well controlled.  No chest pain, palpitations, or shortness of breath.  He is on rosuvastatin for HLD and CAD.    Denies myalgias.  H/o CAD, s/p 5 v. CABG.  Follows with Cardiology Dr. Rizo.      He is on meloxicam for chronic pain in the shoulder and back due to arthritis.        He is on montelukast and cetirizine for allergies.      He is on carbamazepine for trigeminal neuralgia of the left face.  His symptoms have been well controlled.     He is following with Roseann Floresshaniqua for glaucoma.     Review of Systems   Constitutional: Negative for chills, fatigue and fever.   HENT: Negative for congestion, hearing loss and rhinorrhea.    Eyes: Negative for pain and visual disturbance.   Respiratory: Negative for cough and shortness of breath.    Cardiovascular: Negative for chest pain and palpitations.   Gastrointestinal: Negative for abdominal pain, constipation, diarrhea, nausea and vomiting.   Genitourinary: Negative for difficulty urinating and dysuria.   Musculoskeletal: Positive for arthralgias and back pain. Negative for myalgias.   Neurological: Negative for weakness and numbness.   Psychiatric/Behavioral: Negative for dysphoric mood and sleep disturbance. The patient is not nervous/anxious.        Objective   Vital Signs:  Vitals:    05/02/23 0821 05/02/23 0906   BP: 148/83 163/84   BP Location: Left arm Left arm   Patient Position: Sitting Sitting   Cuff Size: Adult Adult   Pulse: (!) 48 (!) 48   SpO2: 97%    Weight: 84.8 kg (187 lb)    Height: 170.2 cm (67.01\")          Physical Exam  Vitals reviewed.   Constitutional:       General: He is not in acute distress.     " Appearance: Normal appearance. He is well-developed.   HENT:      Head: Normocephalic and atraumatic.      Right Ear: External ear normal.      Left Ear: External ear normal.      Mouth/Throat:      Mouth: Mucous membranes are moist.   Eyes:      Extraocular Movements: Extraocular movements intact.      Conjunctiva/sclera: Conjunctivae normal.      Pupils: Pupils are equal, round, and reactive to light.   Cardiovascular:      Rate and Rhythm: Normal rate and regular rhythm.      Heart sounds: No murmur heard.  Pulmonary:      Effort: Pulmonary effort is normal.      Breath sounds: Normal breath sounds. No wheezing, rhonchi or rales.   Abdominal:      General: Bowel sounds are normal. There is no distension.      Palpations: Abdomen is soft.      Tenderness: There is no abdominal tenderness.   Musculoskeletal:         General: Normal range of motion.   Skin:     General: Skin is warm and dry.   Neurological:      Mental Status: He is alert and oriented to person, place, and time.      Deep Tendon Reflexes: Reflexes normal.   Psychiatric:         Mood and Affect: Mood and affect normal.         Behavior: Behavior normal.         Thought Content: Thought content normal.         Judgment: Judgment normal.                    Lab Results   Component Value Date    GLUCOSE 110 (H) 04/26/2022    BUN 13 04/26/2022    CREATININE 0.87 04/26/2022    EGFR 86.1 04/26/2022    BCR 14.9 04/26/2022    K 4.8 04/26/2022    CO2 26.9 04/26/2022    CALCIUM 9.4 04/26/2022    ALBUMIN 4.50 04/26/2022    BILITOT 0.3 04/26/2022    AST 22 04/26/2022    ALT 30 04/26/2022     Lab Results   Component Value Date    CHOL 111 10/27/2022    CHLPL 108 04/23/2021    TRIG 138 10/27/2022    HDL 40 10/27/2022    LDL 47 10/27/2022          Assessment and Plan   Diagnoses and all orders for this visit:    1. Annual physical exam (Primary)  Assessment & Plan:  Colonoscopy is no longer indicated by age and history.  Prostate cancer screening is no longer  indicated by age.  He is UTD on COVID, Pneumovax (6/2015), Xuvgzkw19 (11/2014), Zostavax (8/2010), Shingrix (4/6/2023, going back for shot #2 in a couple of months) and flu (10/2022).  He is due for Tdap (2/2013).  Tdap can be done at the pharmacy.  He is due for routine labs including HTN panel; ordered.      2. Essential hypertension  Assessment & Plan:  Blood pressure is running at goal at home.  Continue amlodipine 5 mg daily, lisinopril 40 mg daily and metoprolol succinate 12.5 mg daily.  Following with Cardiology.  Refills sent on all meds today.  Checking labs.    Orders:  -     Comprehensive Metabolic Panel; Future  -     Lipid Panel; Future  -     CBC Auto Differential; Future  -     amLODIPine (NORVASC) 5 MG tablet; Take 1 tablet by mouth Daily.  Dispense: 90 tablet; Refill: 1  -     lisinopril (PRINIVIL,ZESTRIL) 40 MG tablet; Take 1 tablet by mouth Daily.  Dispense: 90 tablet; Refill: 1  -     metoprolol succinate XL (TOPROL-XL) 25 MG 24 hr tablet; Take 0.5 tablets by mouth Daily.  Dispense: 45 tablet; Refill: 1    3. Mixed hyperlipidemia  Assessment & Plan:  Stable on rosuvastatin 20 mg nightly.  Refills sent.  Checking labs.    Orders:  -     rosuvastatin (CRESTOR) 20 MG tablet; Take 1 tablet by mouth Every Night.  Dispense: 90 tablet; Refill: 1    4. Coronary artery disease involving native coronary artery of native heart without angina pectoris  Overview:  Stable on aspirin, statin, beta-blocker and ACE I.  Following with Cardiology.  Refills sent as above.  Checking labs.      5. Trigeminal neuralgia of left side of face  Assessment & Plan:  Stable on carbamazepine 100 mg twice daily.  Pain is well controlled.  Refills sent.    Orders:  -     carBAMazepine XR (TEGretol  XR) 100 MG 12 hr tablet; Take 1 tablet by mouth 2 (Two) Times a Day.  Dispense: 180 tablet; Refill: 1    6. Gastroesophageal reflux disease without esophagitis  Assessment & Plan:  Stable on famotidine 40 mg daily.  Refills  sent.    Orders:  -     famotidine (PEPCID) 40 MG tablet; Take 1 tablet by mouth Daily.  Dispense: 90 tablet; Refill: 1    7. Chronic bilateral low back pain without sciatica  Assessment & Plan:  Refill sent on meloxicam 50 mg daily as needed.    Orders:  -     meloxicam (MOBIC) 15 MG tablet; Take 1 tablet by mouth Daily As Needed for Moderate Pain.  Dispense: 90 tablet; Refill: 1             Follow Up   Return in about 6 months (around 11/2/2023) for Recheck.  Patient was given instructions and counseling regarding his condition or for health maintenance advice. Please see specific information pulled into the AVS if appropriate.

## 2023-05-02 NOTE — ASSESSMENT & PLAN NOTE
Blood pressure is running at goal at home.  Continue amlodipine 5 mg daily, lisinopril 40 mg daily and metoprolol succinate 12.5 mg daily.  Following with Cardiology.  Refills sent on all meds today.  Checking labs.

## 2023-05-03 ENCOUNTER — LAB (OUTPATIENT)
Dept: LAB | Facility: HOSPITAL | Age: 83
End: 2023-05-03
Payer: MEDICARE

## 2023-05-03 DIAGNOSIS — I10 ESSENTIAL HYPERTENSION: ICD-10-CM

## 2023-05-03 LAB
ALBUMIN SERPL-MCNC: 4.2 G/DL (ref 3.5–5.2)
ALBUMIN/GLOB SERPL: 1.9 G/DL
ALP SERPL-CCNC: 81 U/L (ref 39–117)
ALT SERPL W P-5'-P-CCNC: 20 U/L (ref 1–41)
ANION GAP SERPL CALCULATED.3IONS-SCNC: 9.7 MMOL/L (ref 5–15)
AST SERPL-CCNC: 19 U/L (ref 1–40)
BASOPHILS # BLD AUTO: 0.03 10*3/MM3 (ref 0–0.2)
BASOPHILS NFR BLD AUTO: 0.4 % (ref 0–1.5)
BILIRUB SERPL-MCNC: 0.3 MG/DL (ref 0–1.2)
BUN SERPL-MCNC: 18 MG/DL (ref 8–23)
BUN/CREAT SERPL: 18 (ref 7–25)
CALCIUM SPEC-SCNC: 9 MG/DL (ref 8.6–10.5)
CHLORIDE SERPL-SCNC: 107 MMOL/L (ref 98–107)
CHOLEST SERPL-MCNC: 96 MG/DL (ref 0–200)
CO2 SERPL-SCNC: 25.3 MMOL/L (ref 22–29)
CREAT SERPL-MCNC: 1 MG/DL (ref 0.76–1.27)
DEPRECATED RDW RBC AUTO: 39.3 FL (ref 37–54)
EGFRCR SERPLBLD CKD-EPI 2021: 74.7 ML/MIN/1.73
EOSINOPHIL # BLD AUTO: 0.23 10*3/MM3 (ref 0–0.4)
EOSINOPHIL NFR BLD AUTO: 2.9 % (ref 0.3–6.2)
ERYTHROCYTE [DISTWIDTH] IN BLOOD BY AUTOMATED COUNT: 11.6 % (ref 12.3–15.4)
GLOBULIN UR ELPH-MCNC: 2.2 GM/DL
GLUCOSE SERPL-MCNC: 117 MG/DL (ref 65–99)
HCT VFR BLD AUTO: 42.6 % (ref 37.5–51)
HDLC SERPL-MCNC: 37 MG/DL (ref 40–60)
HGB BLD-MCNC: 14.6 G/DL (ref 13–17.7)
IMM GRANULOCYTES # BLD AUTO: 0.02 10*3/MM3 (ref 0–0.05)
IMM GRANULOCYTES NFR BLD AUTO: 0.3 % (ref 0–0.5)
LDLC SERPL CALC-MCNC: 43 MG/DL (ref 0–100)
LDLC/HDLC SERPL: 1.16 {RATIO}
LYMPHOCYTES # BLD AUTO: 1.6 10*3/MM3 (ref 0.7–3.1)
LYMPHOCYTES NFR BLD AUTO: 20.3 % (ref 19.6–45.3)
MCH RBC QN AUTO: 31.9 PG (ref 26.6–33)
MCHC RBC AUTO-ENTMCNC: 34.3 G/DL (ref 31.5–35.7)
MCV RBC AUTO: 93 FL (ref 79–97)
MONOCYTES # BLD AUTO: 1.32 10*3/MM3 (ref 0.1–0.9)
MONOCYTES NFR BLD AUTO: 16.7 % (ref 5–12)
NEUTROPHILS NFR BLD AUTO: 4.7 10*3/MM3 (ref 1.7–7)
NEUTROPHILS NFR BLD AUTO: 59.4 % (ref 42.7–76)
PLATELET # BLD AUTO: 194 10*3/MM3 (ref 140–450)
PMV BLD AUTO: 11 FL (ref 6–12)
POTASSIUM SERPL-SCNC: 4.4 MMOL/L (ref 3.5–5.2)
PROT SERPL-MCNC: 6.4 G/DL (ref 6–8.5)
RBC # BLD AUTO: 4.58 10*6/MM3 (ref 4.14–5.8)
SODIUM SERPL-SCNC: 142 MMOL/L (ref 136–145)
TRIGL SERPL-MCNC: 80 MG/DL (ref 0–150)
VLDLC SERPL-MCNC: 16 MG/DL (ref 5–40)
WBC NRBC COR # BLD: 7.9 10*3/MM3 (ref 3.4–10.8)

## 2023-05-03 PROCEDURE — 36415 COLL VENOUS BLD VENIPUNCTURE: CPT

## 2023-05-03 PROCEDURE — 80061 LIPID PANEL: CPT

## 2023-05-03 PROCEDURE — 85025 COMPLETE CBC W/AUTO DIFF WBC: CPT

## 2023-05-03 PROCEDURE — 80053 COMPREHEN METABOLIC PANEL: CPT

## 2023-09-03 DIAGNOSIS — M54.50 CHRONIC BILATERAL LOW BACK PAIN WITHOUT SCIATICA: ICD-10-CM

## 2023-09-03 DIAGNOSIS — G89.29 CHRONIC BILATERAL LOW BACK PAIN WITHOUT SCIATICA: ICD-10-CM

## 2023-09-05 RX ORDER — MELOXICAM 15 MG/1
TABLET ORAL
Qty: 90 TABLET | Refills: 1 | Status: SHIPPED | OUTPATIENT
Start: 2023-09-05

## 2023-10-31 ENCOUNTER — OFFICE VISIT (OUTPATIENT)
Dept: FAMILY MEDICINE CLINIC | Age: 83
End: 2023-10-31
Payer: MEDICARE

## 2023-10-31 ENCOUNTER — LAB (OUTPATIENT)
Dept: LAB | Facility: HOSPITAL | Age: 83
End: 2023-10-31
Payer: MEDICARE

## 2023-10-31 VITALS
SYSTOLIC BLOOD PRESSURE: 163 MMHG | HEIGHT: 67 IN | WEIGHT: 188.2 LBS | BODY MASS INDEX: 29.54 KG/M2 | DIASTOLIC BLOOD PRESSURE: 85 MMHG | HEART RATE: 51 BPM | OXYGEN SATURATION: 98 %

## 2023-10-31 DIAGNOSIS — Z12.5 PROSTATE CANCER SCREENING: ICD-10-CM

## 2023-10-31 DIAGNOSIS — I10 ESSENTIAL HYPERTENSION: Primary | ICD-10-CM

## 2023-10-31 DIAGNOSIS — R73.03 PREDIABETES: ICD-10-CM

## 2023-10-31 DIAGNOSIS — I25.10 CORONARY ARTERY DISEASE INVOLVING NATIVE CORONARY ARTERY OF NATIVE HEART WITHOUT ANGINA PECTORIS: ICD-10-CM

## 2023-10-31 DIAGNOSIS — E78.2 MIXED HYPERLIPIDEMIA: ICD-10-CM

## 2023-10-31 DIAGNOSIS — I10 ESSENTIAL HYPERTENSION: ICD-10-CM

## 2023-10-31 DIAGNOSIS — Z23 ENCOUNTER FOR IMMUNIZATION: ICD-10-CM

## 2023-10-31 DIAGNOSIS — J30.9 CHRONIC ALLERGIC RHINITIS: ICD-10-CM

## 2023-10-31 DIAGNOSIS — K21.9 GASTROESOPHAGEAL REFLUX DISEASE WITHOUT ESOPHAGITIS: ICD-10-CM

## 2023-10-31 DIAGNOSIS — G50.0 TRIGEMINAL NEURALGIA OF LEFT SIDE OF FACE: ICD-10-CM

## 2023-10-31 PROBLEM — Z00.00 ANNUAL PHYSICAL EXAM: Status: RESOLVED | Noted: 2022-04-26 | Resolved: 2023-10-31

## 2023-10-31 LAB
ALBUMIN SERPL-MCNC: 4.6 G/DL (ref 3.5–5.2)
ALBUMIN/GLOB SERPL: 2.4 G/DL
ALP SERPL-CCNC: 78 U/L (ref 39–117)
ALT SERPL W P-5'-P-CCNC: 23 U/L (ref 1–41)
ANION GAP SERPL CALCULATED.3IONS-SCNC: 9.5 MMOL/L (ref 5–15)
AST SERPL-CCNC: 22 U/L (ref 1–40)
BASOPHILS # BLD AUTO: 0.03 10*3/MM3 (ref 0–0.2)
BASOPHILS NFR BLD AUTO: 0.5 % (ref 0–1.5)
BILIRUB SERPL-MCNC: 0.5 MG/DL (ref 0–1.2)
BUN SERPL-MCNC: 13 MG/DL (ref 8–23)
BUN/CREAT SERPL: 13.5 (ref 7–25)
CALCIUM SPEC-SCNC: 9 MG/DL (ref 8.6–10.5)
CHLORIDE SERPL-SCNC: 108 MMOL/L (ref 98–107)
CHOLEST SERPL-MCNC: 110 MG/DL (ref 0–200)
CO2 SERPL-SCNC: 24.5 MMOL/L (ref 22–29)
CREAT SERPL-MCNC: 0.96 MG/DL (ref 0.76–1.27)
DEPRECATED RDW RBC AUTO: 39.7 FL (ref 37–54)
EGFRCR SERPLBLD CKD-EPI 2021: 77.9 ML/MIN/1.73
EOSINOPHIL # BLD AUTO: 0.22 10*3/MM3 (ref 0–0.4)
EOSINOPHIL NFR BLD AUTO: 3.9 % (ref 0.3–6.2)
ERYTHROCYTE [DISTWIDTH] IN BLOOD BY AUTOMATED COUNT: 11.7 % (ref 12.3–15.4)
GLOBULIN UR ELPH-MCNC: 1.9 GM/DL
GLUCOSE SERPL-MCNC: 130 MG/DL (ref 65–99)
HCT VFR BLD AUTO: 41.8 % (ref 37.5–51)
HDLC SERPL-MCNC: 41 MG/DL (ref 40–60)
HGB BLD-MCNC: 14.6 G/DL (ref 13–17.7)
IMM GRANULOCYTES # BLD AUTO: 0.02 10*3/MM3 (ref 0–0.05)
IMM GRANULOCYTES NFR BLD AUTO: 0.4 % (ref 0–0.5)
LDLC SERPL CALC-MCNC: 46 MG/DL (ref 0–100)
LDLC/HDLC SERPL: 1.04 {RATIO}
LYMPHOCYTES # BLD AUTO: 1.59 10*3/MM3 (ref 0.7–3.1)
LYMPHOCYTES NFR BLD AUTO: 28.3 % (ref 19.6–45.3)
MCH RBC QN AUTO: 31.8 PG (ref 26.6–33)
MCHC RBC AUTO-ENTMCNC: 34.9 G/DL (ref 31.5–35.7)
MCV RBC AUTO: 91.1 FL (ref 79–97)
MONOCYTES # BLD AUTO: 1.11 10*3/MM3 (ref 0.1–0.9)
MONOCYTES NFR BLD AUTO: 19.8 % (ref 5–12)
NEUTROPHILS NFR BLD AUTO: 2.65 10*3/MM3 (ref 1.7–7)
NEUTROPHILS NFR BLD AUTO: 47.1 % (ref 42.7–76)
PLATELET # BLD AUTO: 180 10*3/MM3 (ref 140–450)
PMV BLD AUTO: 10.7 FL (ref 6–12)
POTASSIUM SERPL-SCNC: 4.2 MMOL/L (ref 3.5–5.2)
PROT SERPL-MCNC: 6.5 G/DL (ref 6–8.5)
PSA SERPL-MCNC: 2.96 NG/ML (ref 0–4)
RBC # BLD AUTO: 4.59 10*6/MM3 (ref 4.14–5.8)
SODIUM SERPL-SCNC: 142 MMOL/L (ref 136–145)
TRIGL SERPL-MCNC: 132 MG/DL (ref 0–150)
VLDLC SERPL-MCNC: 23 MG/DL (ref 5–40)
WBC NRBC COR # BLD: 5.62 10*3/MM3 (ref 3.4–10.8)

## 2023-10-31 PROCEDURE — 85025 COMPLETE CBC W/AUTO DIFF WBC: CPT

## 2023-10-31 PROCEDURE — 80061 LIPID PANEL: CPT

## 2023-10-31 PROCEDURE — G0103 PSA SCREENING: HCPCS

## 2023-10-31 PROCEDURE — 36415 COLL VENOUS BLD VENIPUNCTURE: CPT

## 2023-10-31 PROCEDURE — 80053 COMPREHEN METABOLIC PANEL: CPT

## 2023-10-31 PROCEDURE — 83036 HEMOGLOBIN GLYCOSYLATED A1C: CPT

## 2023-10-31 RX ORDER — CARBAMAZEPINE 100 MG/1
100 TABLET, EXTENDED RELEASE ORAL 2 TIMES DAILY
Qty: 180 TABLET | Refills: 1 | Status: SHIPPED | OUTPATIENT
Start: 2023-10-31

## 2023-10-31 RX ORDER — MONTELUKAST SODIUM 10 MG/1
10 TABLET ORAL NIGHTLY
Qty: 90 TABLET | Refills: 3 | Status: SHIPPED | OUTPATIENT
Start: 2023-10-31

## 2023-10-31 RX ORDER — ROSUVASTATIN CALCIUM 20 MG/1
20 TABLET, COATED ORAL NIGHTLY
Qty: 90 TABLET | Refills: 1 | Status: SHIPPED | OUTPATIENT
Start: 2023-10-31

## 2023-10-31 RX ORDER — LISINOPRIL 40 MG/1
40 TABLET ORAL DAILY
Qty: 90 TABLET | Refills: 1 | Status: SHIPPED | OUTPATIENT
Start: 2023-10-31

## 2023-10-31 RX ORDER — AMLODIPINE BESYLATE 5 MG/1
5 TABLET ORAL DAILY
Qty: 90 TABLET | Refills: 1 | Status: SHIPPED | OUTPATIENT
Start: 2023-10-31

## 2023-10-31 RX ORDER — FAMOTIDINE 40 MG/1
40 TABLET, FILM COATED ORAL DAILY
Qty: 90 TABLET | Refills: 1 | Status: SHIPPED | OUTPATIENT
Start: 2023-10-31

## 2023-10-31 RX ORDER — METOPROLOL SUCCINATE 25 MG/1
12.5 TABLET, EXTENDED RELEASE ORAL DAILY
Qty: 45 TABLET | Refills: 1 | Status: SHIPPED | OUTPATIENT
Start: 2023-10-31

## 2023-10-31 NOTE — ASSESSMENT & PLAN NOTE
Blood pressure has been running at goal.  Continue lidocaine 5 mg daily, lisinopril 40 mg daily, and metoprolol succinate 12.5 mg daily.  Refills were needed today.  Labs were needed today.  Continue to monitor.

## 2023-10-31 NOTE — ASSESSMENT & PLAN NOTE
Stable on rosuvastatin 20 mg daily.  Refills were needed today.  Labs were due today.  Continue to monitor.

## 2023-10-31 NOTE — PROGRESS NOTES
Chief Complaint  Hypertension (6 month f/u)    Subjective          Mukesh Beard presents to [facility] today for routine f/u of chronic issues.    He is on metoprolol succinate, lisinopril and amlodipine for HTN.  His blood pressure has been well controlled when he checks it at home, although it is elevated today in clinic.  He does go to see his cardiologist next month.  Denies chest pain, palpitations or shortness of breath.  He is on rosuvastatin for HLD and CAD.  Denies myalgias.  History of CAD, s/p 5 v. CABG.  He follows with Cardiology Dr. Proctor.      He is on meloxicam for chronic shoulder and back pain d/t OA.      He is on montelukast and cetirizine for chronic allergies.      He is on carbamazepine for trigeminal neuralgia of the left face.  Pain has been well controlled.  No adverse effects.    He is on famotidine for GERD.  No indigestion or reflux.     He is following with Roseann Opho for glaucoma.       Current Outpatient Medications:     amLODIPine (NORVASC) 5 MG tablet, Take 1 tablet by mouth Daily., Disp: 90 tablet, Rfl: 1    aspirin 81 MG tablet, Take 1 tablet by mouth Every Night. Resume 4/14/18, Disp: , Rfl:     carBAMazepine XR (TEGretol  XR) 100 MG 12 hr tablet, Take 1 tablet by mouth 2 (Two) Times a Day., Disp: 180 tablet, Rfl: 1    cetirizine (zyrTEC) 10 MG tablet, Take 1 tablet by mouth Daily., Disp: 90 tablet, Rfl: 3    Cholecalciferol (Vitamin D3) 50 MCG (2000 UT) capsule, Take 1 capsule by mouth Every Night., Disp: 90 capsule, Rfl: 3    dorzolamide-timolol (COSOPT) 22.3-6.8 MG/ML ophthalmic solution, 1 drop 2 (Two) Times a Day., Disp: , Rfl:     famotidine (PEPCID) 40 MG tablet, Take 1 tablet by mouth Daily., Disp: 90 tablet, Rfl: 1    lisinopril (PRINIVIL,ZESTRIL) 40 MG tablet, Take 1 tablet by mouth Daily., Disp: 90 tablet, Rfl: 1    meloxicam (MOBIC) 15 MG tablet, TAKE 1 TABLET DAILY AS     NEEDED FOR MODERATE PAIN, Disp: 90  "tablet, Rfl: 1    metoprolol succinate XL (TOPROL-XL) 25 MG 24 hr tablet, Take 0.5 tablets by mouth Daily., Disp: 45 tablet, Rfl: 1    montelukast (SINGULAIR) 10 MG tablet, Take 1 tablet by mouth Every Night., Disp: 90 tablet, Rfl: 3    niacin 500 MG tablet, Take 2 tablets by mouth Every Night., Disp: 180 tablet, Rfl: 3    rosuvastatin (CRESTOR) 20 MG tablet, Take 1 tablet by mouth Every Night., Disp: 90 tablet, Rfl: 1    Allergies:  Benzalkonium      Objective   Vital Signs:   Vitals:    10/31/23 0800 10/31/23 0822   BP: 157/85 163/85   BP Location: Left arm Left arm   Patient Position: Sitting Sitting   Cuff Size: Adult Adult   Pulse: 50 51   SpO2: 98%    Weight: 85.4 kg (188 lb 3.2 oz)    Height: 170.2 cm (67.01\")        Physical Exam  Vitals reviewed.   Constitutional:       General: He is not in acute distress.     Appearance: Normal appearance. He is well-developed.   HENT:      Head: Normocephalic and atraumatic.      Right Ear: External ear normal.      Left Ear: External ear normal.   Eyes:      Extraocular Movements: Extraocular movements intact.      Conjunctiva/sclera: Conjunctivae normal.      Pupils: Pupils are equal, round, and reactive to light.   Cardiovascular:      Rate and Rhythm: Normal rate and regular rhythm.      Heart sounds: No murmur heard.  Pulmonary:      Effort: Pulmonary effort is normal.      Breath sounds: Normal breath sounds. No wheezing, rhonchi or rales.   Abdominal:      General: Bowel sounds are normal. There is no distension.      Palpations: Abdomen is soft.      Tenderness: There is no abdominal tenderness.   Musculoskeletal:         General: Normal range of motion.   Neurological:      Mental Status: He is alert.   Psychiatric:         Mood and Affect: Affect normal.          Lab Results   Component Value Date    GLUCOSE 117 (H) 05/03/2023    BUN 18 05/03/2023    CREATININE 1.00 05/03/2023    EGFR 74.7 05/03/2023    BCR 18.0 05/03/2023    K 4.4 05/03/2023    CO2 25.3 " 05/03/2023    CALCIUM 9.0 05/03/2023    ALBUMIN 4.2 05/03/2023    BILITOT 0.3 05/03/2023    AST 19 05/03/2023    ALT 20 05/03/2023       Lab Results   Component Value Date    CHOL 96 05/03/2023    CHLPL 108 04/23/2021    TRIG 80 05/03/2023    HDL 37 (L) 05/03/2023    LDL 43 05/03/2023       Lab Results   Component Value Date    WBC 7.90 05/03/2023    HGB 14.6 05/03/2023    HCT 42.6 05/03/2023    MCV 93.0 05/03/2023     05/03/2023          Assessment and Plan    Diagnoses and all orders for this visit:    1. Essential hypertension (Primary)  Assessment & Plan:  Blood pressure has been running at goal.  Continue lidocaine 5 mg daily, lisinopril 40 mg daily, and metoprolol succinate 12.5 mg daily.  Refills were needed today.  Labs were needed today.  Continue to monitor.      Orders:  -     Comprehensive Metabolic Panel; Future  -     Lipid Panel; Future  -     CBC Auto Differential; Future  -     amLODIPine (NORVASC) 5 MG tablet; Take 1 tablet by mouth Daily.  Dispense: 90 tablet; Refill: 1  -     lisinopril (PRINIVIL,ZESTRIL) 40 MG tablet; Take 1 tablet by mouth Daily.  Dispense: 90 tablet; Refill: 1  -     metoprolol succinate XL (TOPROL-XL) 25 MG 24 hr tablet; Take 0.5 tablets by mouth Daily.  Dispense: 45 tablet; Refill: 1    2. Mixed hyperlipidemia  Assessment & Plan:  Stable on rosuvastatin 20 mg daily.  Refills were needed today.  Labs were due today.  Continue to monitor.      Orders:  -     rosuvastatin (CRESTOR) 20 MG tablet; Take 1 tablet by mouth Every Night.  Dispense: 90 tablet; Refill: 1    3. Coronary artery disease involving native coronary artery of native heart without angina pectoris  Overview:  Stable on aspirin, statin, beta-blocker and ACE I.  Following with Cardiology.  Refills sent as above.  Checking labs.      4. Trigeminal neuralgia of left side of face  Assessment & Plan:  Stable on carbamazepine 100 mg twice daily.  Refills were needed today.  Continue to monitor.      Orders:  -      carBAMazepine XR (TEGretol  XR) 100 MG 12 hr tablet; Take 1 tablet by mouth 2 (Two) Times a Day.  Dispense: 180 tablet; Refill: 1    5. Gastroesophageal reflux disease without esophagitis  Assessment & Plan:  Stable on famotidine 40 mg daily.  Refills were needed today.  Continue to monitor.      Orders:  -     famotidine (PEPCID) 40 MG tablet; Take 1 tablet by mouth Daily.  Dispense: 90 tablet; Refill: 1    6. Chronic allergic rhinitis  -     montelukast (SINGULAIR) 10 MG tablet; Take 1 tablet by mouth Every Night.  Dispense: 90 tablet; Refill: 3    7. Encounter for immunization  -     COVID-19 F23 (Pfizer) 12yrs+ (COMIRNATY)  -     Fluzone High-Dose 65+yrs    8. Prostate cancer screening  Comments:  Requested by patient; brother recently diagnosed with prostate cancer.  Orders:  -     PSA Screen; Future          Follow Up   Return in about 6 months (around 4/30/2024) for Medicare Wellness.  Patient was given instructions and counseling regarding his condition or for health maintenance advice. Please see specific information pulled into the AVS if appropriate.

## 2023-11-01 LAB — HBA1C MFR BLD: 5.8 % (ref 4.8–5.6)

## 2023-11-30 ENCOUNTER — OFFICE VISIT (OUTPATIENT)
Dept: CARDIOLOGY | Facility: CLINIC | Age: 83
End: 2023-11-30
Payer: MEDICARE

## 2023-11-30 VITALS
BODY MASS INDEX: 29.35 KG/M2 | WEIGHT: 187 LBS | DIASTOLIC BLOOD PRESSURE: 90 MMHG | HEIGHT: 67 IN | HEART RATE: 53 BPM | SYSTOLIC BLOOD PRESSURE: 160 MMHG

## 2023-11-30 DIAGNOSIS — E78.2 MIXED HYPERLIPIDEMIA: ICD-10-CM

## 2023-11-30 DIAGNOSIS — I25.810 CORONARY ARTERY DISEASE INVOLVING CORONARY BYPASS GRAFT OF NATIVE HEART WITHOUT ANGINA PECTORIS: Primary | ICD-10-CM

## 2023-11-30 DIAGNOSIS — I65.22 CAROTID STENOSIS, ASYMPTOMATIC, LEFT: ICD-10-CM

## 2023-11-30 DIAGNOSIS — I10 ESSENTIAL HYPERTENSION: ICD-10-CM

## 2023-11-30 DIAGNOSIS — I25.10 CORONARY ARTERY DISEASE INVOLVING NATIVE CORONARY ARTERY OF NATIVE HEART WITHOUT ANGINA PECTORIS: ICD-10-CM

## 2023-11-30 PROCEDURE — 1159F MED LIST DOCD IN RCRD: CPT | Performed by: NURSE PRACTITIONER

## 2023-11-30 PROCEDURE — 93000 ELECTROCARDIOGRAM COMPLETE: CPT | Performed by: NURSE PRACTITIONER

## 2023-11-30 PROCEDURE — 99214 OFFICE O/P EST MOD 30 MIN: CPT | Performed by: NURSE PRACTITIONER

## 2023-11-30 PROCEDURE — 3080F DIAST BP >= 90 MM HG: CPT | Performed by: NURSE PRACTITIONER

## 2023-11-30 PROCEDURE — 3077F SYST BP >= 140 MM HG: CPT | Performed by: NURSE PRACTITIONER

## 2023-11-30 PROCEDURE — 1160F RVW MEDS BY RX/DR IN RCRD: CPT | Performed by: NURSE PRACTITIONER

## 2023-11-30 NOTE — PROGRESS NOTES
"    CARDIOLOGY        Patient Name: Mukesh Beard  :1940  Age: 83 y.o.  Primary Cardiologist: Emir Proctor MD  Encounter Provider:  RICHA Morales    Date of Service: 23        CHIEF COMPLAINT / REASON FOR OFFICE VISIT     1 YEAR FOLLOW UP      HISTORY OF PRESENT ILLNESS       HPI  Mukesh Beard is a 83 y.o. male who presents today for annual evaluation.     Pt has a  history significant for CAD status post CABG in , hypertension, dyslipidemia, carotid artery stenosis.    Patient reports for annual assessment.  He states that he has done well over the past year.  Patient does exercise by walking a mile and a quarter on a daily basis.  He does not have exertional symptoms.  Patient notes history of whitecoat syndrome and states that blood pressure is often elevated when he comes to physicians offices.  He does have blood pressures documented at home where they are controlled in the 120s/70s.  Patient is asymptomatic and denies any episodes of chest pain, shortness of breath, palpitations, lightheadedness, swelling or fatigue.        The following portions of the patient's history were reviewed and updated as appropriate: allergies, current medications, past family history, past medical history, past social history, past surgical history and problem list.      VITAL SIGNS     Visit Vitals  /90 (BP Location: Left arm, Patient Position: Sitting)   Pulse 53   Ht 170.2 cm (67\")   Wt 84.8 kg (187 lb)   BMI 29.29 kg/m²         Wt Readings from Last 3 Encounters:   23 84.8 kg (187 lb)   10/31/23 85.4 kg (188 lb 3.2 oz)   23 84.8 kg (187 lb)     Body mass index is 29.29 kg/m².      REVIEW OF SYSTEMS   ROS        PHYSICAL EXAMINATION     Physical Exam      REVIEWED DATA       ECG 12 Lead    Date/Time: 2023 1:53 PM  Performed by: Milli Fowler APRN    Authorized by: Milli Fowler APRN  Comparison: compared with previous ECG from 2022  Rhythm: sinus " "rhythm  Rate: bradycardic  BPM: 53  Conduction: right bundle branch block and 1st degree AV block  ST Segments: ST segments normal  T Waves: T waves normal  QRS axis: normal    Clinical impression: non-specific ECG          Cardiac Procedures:  CABG May 2000 where he had left internal mammary graft to left anterior descending, vein graft to the diagonal, vein graft to the obtuse marginal, vein graft to the second obtuse marginal, and vein graft to the posterior descending artery   Myocardial perfusion PET stress test 12/6/2021.  Normal myocardial perfusion study without evidence of ischemia.  Impressions consistent with a low risk study.    Lipid Panel          5/3/2023    09:35 10/31/2023    08:49   Lipid Panel   Total Cholesterol 96  110    Triglycerides 80  132    HDL Cholesterol 37  41    VLDL Cholesterol 16  23    LDL Cholesterol  43  46    LDL/HDL Ratio 1.16  1.04        Lab Results   Component Value Date     10/31/2023     05/03/2023    K 4.2 10/31/2023    K 4.4 05/03/2023     (H) 10/31/2023     05/03/2023    CO2 24.5 10/31/2023    CO2 25.3 05/03/2023    BUN 13 10/31/2023    BUN 18 05/03/2023    CREATININE 0.96 10/31/2023    CREATININE 1.00 05/03/2023    EGFRIFNONA 81 10/27/2021    EGFRIFNONA 88 04/03/2018    GLUCOSE 130 (H) 10/31/2023    GLUCOSE 117 (H) 05/03/2023    CALCIUM 9.0 10/31/2023    CALCIUM 9.0 05/03/2023    ALBUMIN 4.6 10/31/2023    ALBUMIN 4.2 05/03/2023    BILITOT 0.5 10/31/2023    BILITOT 0.3 05/03/2023    AST 22 10/31/2023    AST 19 05/03/2023    ALT 23 10/31/2023    ALT 20 05/03/2023     Lab Results   Component Value Date    WBC 5.62 10/31/2023    WBC 7.90 05/03/2023    HGB 14.6 10/31/2023    HGB 14.6 05/03/2023    HCT 41.8 10/31/2023    HCT 42.6 05/03/2023    MCV 91.1 10/31/2023    MCV 93.0 05/03/2023     10/31/2023     05/03/2023     No results found for: \"PROBNP\", \"BNP\"  No results found for: \"CKTOTAL\", \"CKMB\", \"CKMBINDEX\", \"TROPONINI\", \"TROPONINT\"  No " "results found for: \"TSH\"          ASSESSMENT & PLAN     .  Diagnoses and all orders for this visit:    1. Coronary artery disease involving coronary bypass graft of native heart without angina pectoris (Primary)  -     ECG 12 Lead    2. Carotid stenosis, asymptomatic, left  Assessment & Plan:  Patient with bilateral carotid artery bruits.  This is known to patient.  Most recent carotid artery duplex was in 2021, recommend repeat    Orders:  -     Duplex Carotid Ultrasound CAR; Future    3. Essential hypertension  Assessment & Plan:  Blood pressure is elevated in clinic however patient's blood pressure has been in the 120s/70s at home.  Reports history of whitecoat syndrome  Continue to monitor blood pressure at home  Continue amlodipine 5 mg/day, lisinopril 40 mg/day, metoprolol succinate 12.5 mg/day.      4. Coronary artery disease involving native coronary artery of native heart without angina pectoris  Assessment & Plan:  History of CABG in 2000  Denies angina or dyspnea  Continue rosuvastatin, metoprolol succinate, lisinopril, aspirin      5. Mixed hyperlipidemia  Assessment & Plan:  Goal LDL less than 70  Continue rosuvastatin          Return in about 1 year (around 11/30/2024) for Dr. Proctor-.    Future Appointments         Provider Department Center    5/3/2024 8:30 AM Alexandra Barnes MD [facility]    12/2/2024 11:15 AM Emir Proctor Jr., MD [facility] C[facility] CHEMA                  MEDICATIONS         Discharge Medications            Accurate as of November 30, 2023  2:42 PM. If you have any questions, ask your nurse or doctor.                Changes to Medications        Instructions Start Date   Vitamin D3 50 MCG (2000 UT) capsule  What changed: how much to take   2,000 Units, Oral, Nightly             Continue These Medications        Instructions Start Date   amLODIPine 5 MG tablet  Commonly known as: NORVASC   5 mg, Oral, Daily    "   aspirin 81 MG tablet   81 mg, Oral, Nightly, Resume 4/14/18      carBAMazepine  MG 12 hr tablet  Commonly known as: TEGretol  XR   100 mg, Oral, 2 Times Daily      cetirizine 10 MG tablet  Commonly known as: zyrTEC   10 mg, Oral, Daily      dorzolamide-timolol 2-0.5 % ophthalmic solution  Commonly known as: COSOPT   1 drop, 2 Times Daily      famotidine 40 MG tablet  Commonly known as: PEPCID   40 mg, Oral, Daily      lisinopril 40 MG tablet  Commonly known as: PRINIVIL,ZESTRIL   40 mg, Oral, Daily      meloxicam 15 MG tablet  Commonly known as: MOBIC   TAKE 1 TABLET DAILY AS     NEEDED FOR MODERATE PAIN      metoprolol succinate XL 25 MG 24 hr tablet  Commonly known as: TOPROL-XL   12.5 mg, Oral, Daily      montelukast 10 MG tablet  Commonly known as: SINGULAIR   10 mg, Oral, Nightly      niacin 500 MG tablet   1,000 mg, Oral, Nightly      rosuvastatin 20 MG tablet  Commonly known as: CRESTOR   20 mg, Oral, Nightly                   **Dragon Disclaimer:   Much of this encounter note is an electronic transcription/translation of spoken language to printed text. The electronic translation of spoken language may permit erroneous, or at times, nonsensical words or phrases to be inadvertently transcribed. Although I have reviewed the note for such errors, some may still exist.

## 2023-11-30 NOTE — ASSESSMENT & PLAN NOTE
History of CABG in 2000  Denies angina or dyspnea  Continue rosuvastatin, metoprolol succinate, lisinopril, aspirin

## 2023-11-30 NOTE — ASSESSMENT & PLAN NOTE
Patient with bilateral carotid artery bruits.  This is known to patient.  Most recent carotid artery duplex was in 2021, recommend repeat

## 2023-11-30 NOTE — ASSESSMENT & PLAN NOTE
Blood pressure is elevated in clinic however patient's blood pressure has been in the 120s/70s at home.  Reports history of whitecoat syndrome  Continue to monitor blood pressure at home  Continue amlodipine 5 mg/day, lisinopril 40 mg/day, metoprolol succinate 12.5 mg/day.

## 2023-12-06 ENCOUNTER — HOSPITAL ENCOUNTER (OUTPATIENT)
Facility: HOSPITAL | Age: 83
Discharge: HOME | End: 2023-12-06
Admitting: NURSE PRACTITIONER
Payer: MEDICARE

## 2023-12-06 DIAGNOSIS — I65.22 CAROTID STENOSIS, ASYMPTOMATIC, LEFT: ICD-10-CM

## 2023-12-06 LAB
BH CV XLRA MEAS LEFT CAROTID BULB EDV: 11 CM/SEC
BH CV XLRA MEAS LEFT CAROTID BULB PSV: 35 CM/SEC
BH CV XLRA MEAS LEFT DIST CCA EDV: -13 CM/SEC
BH CV XLRA MEAS LEFT DIST CCA PSV: -66.5 CM/SEC
BH CV XLRA MEAS LEFT DIST ICA EDV: -11.5 CM/SEC
BH CV XLRA MEAS LEFT DIST ICA PSV: -42.8 CM/SEC
BH CV XLRA MEAS LEFT ICA/CCA RATIO: 0.92
BH CV XLRA MEAS LEFT MID ICA EDV: -16.5 CM/SEC
BH CV XLRA MEAS LEFT MID ICA PSV: -60.9 CM/SEC
BH CV XLRA MEAS LEFT PROX CCA EDV: -14.3 CM/SEC
BH CV XLRA MEAS LEFT PROX CCA PSV: -85.1 CM/SEC
BH CV XLRA MEAS LEFT PROX ECA EDV: -11 CM/SEC
BH CV XLRA MEAS LEFT PROX ECA PSV: -78.5 CM/SEC
BH CV XLRA MEAS LEFT PROX ICA EDV: -14.8 CM/SEC
BH CV XLRA MEAS LEFT PROX ICA PSV: -52.1 CM/SEC
BH CV XLRA MEAS LEFT VERTEBRAL A EDV: -2.2 CM/SEC
BH CV XLRA MEAS LEFT VERTEBRAL A PSV: -33.5 CM/SEC
BH CV XLRA MEAS RIGHT CAROTID BULB EDV: 16 CM/SEC
BH CV XLRA MEAS RIGHT CAROTID BULB PSV: 68 CM/SEC
BH CV XLRA MEAS RIGHT DIST CCA EDV: 16.8 CM/SEC
BH CV XLRA MEAS RIGHT DIST CCA PSV: 72.1 CM/SEC
BH CV XLRA MEAS RIGHT DIST ICA EDV: -15.2 CM/SEC
BH CV XLRA MEAS RIGHT DIST ICA PSV: -50.6 CM/SEC
BH CV XLRA MEAS RIGHT ICA/CCA RATIO: -0.94
BH CV XLRA MEAS RIGHT MID ICA EDV: 16.2 CM/SEC
BH CV XLRA MEAS RIGHT MID ICA PSV: 59.6 CM/SEC
BH CV XLRA MEAS RIGHT PROX CCA EDV: -11.8 CM/SEC
BH CV XLRA MEAS RIGHT PROX CCA PSV: -106.2 CM/SEC
BH CV XLRA MEAS RIGHT PROX ECA EDV: -6.9 CM/SEC
BH CV XLRA MEAS RIGHT PROX ECA PSV: -58.5 CM/SEC
BH CV XLRA MEAS RIGHT PROX ICA EDV: -18 CM/SEC
BH CV XLRA MEAS RIGHT PROX ICA PSV: -67.7 CM/SEC
BH CV XLRA MEAS RIGHT VERTEBRAL A EDV: 11.8 CM/SEC
BH CV XLRA MEAS RIGHT VERTEBRAL A PSV: 59 CM/SEC
LEFT ARM BP: NORMAL MMHG
RIGHT ARM BP: NORMAL MMHG

## 2023-12-06 PROCEDURE — 93880 EXTRACRANIAL BILAT STUDY: CPT | Performed by: SURGERY

## 2023-12-06 PROCEDURE — 93880 EXTRACRANIAL BILAT STUDY: CPT

## 2024-02-18 DIAGNOSIS — I10 ESSENTIAL HYPERTENSION: ICD-10-CM

## 2024-02-19 RX ORDER — LISINOPRIL 40 MG/1
40 TABLET ORAL DAILY
Qty: 90 TABLET | Refills: 1 | Status: SHIPPED | OUTPATIENT
Start: 2024-02-19

## 2024-03-24 DIAGNOSIS — M54.50 CHRONIC BILATERAL LOW BACK PAIN WITHOUT SCIATICA: ICD-10-CM

## 2024-03-24 DIAGNOSIS — G89.29 CHRONIC BILATERAL LOW BACK PAIN WITHOUT SCIATICA: ICD-10-CM

## 2024-03-26 RX ORDER — MELOXICAM 15 MG/1
TABLET ORAL
Qty: 90 TABLET | Refills: 1 | Status: SHIPPED | OUTPATIENT
Start: 2024-03-26

## 2024-05-03 ENCOUNTER — LAB (OUTPATIENT)
Dept: LAB | Facility: HOSPITAL | Age: 84
End: 2024-05-03
Payer: MEDICARE

## 2024-05-03 ENCOUNTER — OFFICE VISIT (OUTPATIENT)
Dept: FAMILY MEDICINE CLINIC | Age: 84
End: 2024-05-03
Payer: MEDICARE

## 2024-05-03 VITALS
BODY MASS INDEX: 29.44 KG/M2 | WEIGHT: 187.6 LBS | TEMPERATURE: 98.1 F | HEIGHT: 67 IN | DIASTOLIC BLOOD PRESSURE: 76 MMHG | HEART RATE: 44 BPM | SYSTOLIC BLOOD PRESSURE: 152 MMHG

## 2024-05-03 DIAGNOSIS — K21.9 GASTROESOPHAGEAL REFLUX DISEASE WITHOUT ESOPHAGITIS: ICD-10-CM

## 2024-05-03 DIAGNOSIS — J30.9 CHRONIC ALLERGIC RHINITIS: ICD-10-CM

## 2024-05-03 DIAGNOSIS — Z00.00 ANNUAL PHYSICAL EXAM: Primary | ICD-10-CM

## 2024-05-03 DIAGNOSIS — I25.10 CORONARY ARTERY DISEASE INVOLVING NATIVE CORONARY ARTERY OF NATIVE HEART WITHOUT ANGINA PECTORIS: ICD-10-CM

## 2024-05-03 DIAGNOSIS — Z23 ENCOUNTER FOR IMMUNIZATION: ICD-10-CM

## 2024-05-03 DIAGNOSIS — G50.0 TRIGEMINAL NEURALGIA OF LEFT SIDE OF FACE: ICD-10-CM

## 2024-05-03 DIAGNOSIS — H40.9 GLAUCOMA OF BOTH EYES, UNSPECIFIED GLAUCOMA TYPE: ICD-10-CM

## 2024-05-03 DIAGNOSIS — I10 ESSENTIAL HYPERTENSION: ICD-10-CM

## 2024-05-03 DIAGNOSIS — E78.2 MIXED HYPERLIPIDEMIA: ICD-10-CM

## 2024-05-03 LAB
ALBUMIN SERPL-MCNC: 4.5 G/DL (ref 3.5–5.2)
ALBUMIN/GLOB SERPL: 2.1 G/DL
ALP SERPL-CCNC: 84 U/L (ref 39–117)
ALT SERPL W P-5'-P-CCNC: 22 U/L (ref 1–41)
ANION GAP SERPL CALCULATED.3IONS-SCNC: 9 MMOL/L (ref 5–15)
AST SERPL-CCNC: 20 U/L (ref 1–40)
BASOPHILS # BLD AUTO: 0.02 10*3/MM3 (ref 0–0.2)
BASOPHILS NFR BLD AUTO: 0.4 % (ref 0–1.5)
BILIRUB SERPL-MCNC: 0.4 MG/DL (ref 0–1.2)
BUN SERPL-MCNC: 20 MG/DL (ref 8–23)
BUN/CREAT SERPL: 20.6 (ref 7–25)
CALCIUM SPEC-SCNC: 9.2 MG/DL (ref 8.6–10.5)
CHLORIDE SERPL-SCNC: 107 MMOL/L (ref 98–107)
CHOLEST SERPL-MCNC: 116 MG/DL (ref 0–200)
CO2 SERPL-SCNC: 27 MMOL/L (ref 22–29)
CREAT SERPL-MCNC: 0.97 MG/DL (ref 0.76–1.27)
DEPRECATED RDW RBC AUTO: 39.7 FL (ref 37–54)
EGFRCR SERPLBLD CKD-EPI 2021: 77 ML/MIN/1.73
EOSINOPHIL # BLD AUTO: 0.2 10*3/MM3 (ref 0–0.4)
EOSINOPHIL NFR BLD AUTO: 3.5 % (ref 0.3–6.2)
ERYTHROCYTE [DISTWIDTH] IN BLOOD BY AUTOMATED COUNT: 11.5 % (ref 12.3–15.4)
GLOBULIN UR ELPH-MCNC: 2.1 GM/DL
GLUCOSE SERPL-MCNC: 127 MG/DL (ref 65–99)
HCT VFR BLD AUTO: 44.3 % (ref 37.5–51)
HDLC SERPL-MCNC: 37 MG/DL (ref 40–60)
HGB BLD-MCNC: 15.1 G/DL (ref 13–17.7)
IMM GRANULOCYTES # BLD AUTO: 0.02 10*3/MM3 (ref 0–0.05)
IMM GRANULOCYTES NFR BLD AUTO: 0.4 % (ref 0–0.5)
LDLC SERPL CALC-MCNC: 52 MG/DL (ref 0–100)
LDLC/HDLC SERPL: 1.26 {RATIO}
LYMPHOCYTES # BLD AUTO: 1.65 10*3/MM3 (ref 0.7–3.1)
LYMPHOCYTES NFR BLD AUTO: 28.9 % (ref 19.6–45.3)
MCH RBC QN AUTO: 31.8 PG (ref 26.6–33)
MCHC RBC AUTO-ENTMCNC: 34.1 G/DL (ref 31.5–35.7)
MCV RBC AUTO: 93.3 FL (ref 79–97)
MONOCYTES # BLD AUTO: 1.18 10*3/MM3 (ref 0.1–0.9)
MONOCYTES NFR BLD AUTO: 20.7 % (ref 5–12)
NEUTROPHILS NFR BLD AUTO: 2.63 10*3/MM3 (ref 1.7–7)
NEUTROPHILS NFR BLD AUTO: 46.1 % (ref 42.7–76)
PLATELET # BLD AUTO: 158 10*3/MM3 (ref 140–450)
PMV BLD AUTO: 10.1 FL (ref 6–12)
POTASSIUM SERPL-SCNC: 4.3 MMOL/L (ref 3.5–5.2)
PROT SERPL-MCNC: 6.6 G/DL (ref 6–8.5)
RBC # BLD AUTO: 4.75 10*6/MM3 (ref 4.14–5.8)
SODIUM SERPL-SCNC: 143 MMOL/L (ref 136–145)
TRIGL SERPL-MCNC: 162 MG/DL (ref 0–150)
VLDLC SERPL-MCNC: 27 MG/DL (ref 5–40)
WBC NRBC COR # BLD AUTO: 5.7 10*3/MM3 (ref 3.4–10.8)

## 2024-05-03 PROCEDURE — 80061 LIPID PANEL: CPT

## 2024-05-03 PROCEDURE — 80053 COMPREHEN METABOLIC PANEL: CPT

## 2024-05-03 PROCEDURE — 36415 COLL VENOUS BLD VENIPUNCTURE: CPT

## 2024-05-03 PROCEDURE — 85025 COMPLETE CBC W/AUTO DIFF WBC: CPT

## 2024-05-03 RX ORDER — MONTELUKAST SODIUM 10 MG/1
10 TABLET ORAL NIGHTLY
Qty: 90 TABLET | Refills: 3 | Status: SHIPPED | OUTPATIENT
Start: 2024-05-03

## 2024-05-03 RX ORDER — AMLODIPINE BESYLATE 5 MG/1
5 TABLET ORAL DAILY
Qty: 90 TABLET | Refills: 1 | Status: SHIPPED | OUTPATIENT
Start: 2024-05-03 | End: 2024-05-03

## 2024-05-03 RX ORDER — ROSUVASTATIN CALCIUM 20 MG/1
20 TABLET, COATED ORAL NIGHTLY
Qty: 90 TABLET | Refills: 1 | Status: SHIPPED | OUTPATIENT
Start: 2024-05-03

## 2024-05-03 RX ORDER — CARBAMAZEPINE 100 MG/1
100 TABLET, EXTENDED RELEASE ORAL 2 TIMES DAILY
Qty: 180 TABLET | Refills: 1 | Status: SHIPPED | OUTPATIENT
Start: 2024-05-03 | End: 2024-05-03

## 2024-05-03 RX ORDER — MONTELUKAST SODIUM 10 MG/1
10 TABLET ORAL NIGHTLY
Qty: 90 TABLET | Refills: 3 | Status: SHIPPED | OUTPATIENT
Start: 2024-05-03 | End: 2024-05-03

## 2024-05-03 RX ORDER — METOPROLOL SUCCINATE 25 MG/1
12.5 TABLET, EXTENDED RELEASE ORAL DAILY
Qty: 45 TABLET | Refills: 1 | Status: SHIPPED | OUTPATIENT
Start: 2024-05-03 | End: 2024-05-03

## 2024-05-03 RX ORDER — FAMOTIDINE 40 MG/1
40 TABLET, FILM COATED ORAL DAILY
Qty: 90 TABLET | Refills: 1 | Status: SHIPPED | OUTPATIENT
Start: 2024-05-03

## 2024-05-03 RX ORDER — CARBAMAZEPINE 100 MG/1
100 TABLET, EXTENDED RELEASE ORAL 2 TIMES DAILY
Qty: 180 TABLET | Refills: 1 | Status: SHIPPED | OUTPATIENT
Start: 2024-05-03

## 2024-05-03 RX ORDER — METOPROLOL SUCCINATE 25 MG/1
12.5 TABLET, EXTENDED RELEASE ORAL DAILY
Qty: 45 TABLET | Refills: 1 | Status: SHIPPED | OUTPATIENT
Start: 2024-05-03

## 2024-05-03 RX ORDER — ROSUVASTATIN CALCIUM 20 MG/1
20 TABLET, COATED ORAL NIGHTLY
Qty: 90 TABLET | Refills: 1 | Status: SHIPPED | OUTPATIENT
Start: 2024-05-03 | End: 2024-05-03

## 2024-05-03 RX ORDER — FAMOTIDINE 40 MG/1
40 TABLET, FILM COATED ORAL DAILY
Qty: 90 TABLET | Refills: 1 | Status: SHIPPED | OUTPATIENT
Start: 2024-05-03 | End: 2024-05-03

## 2024-05-03 RX ORDER — AMLODIPINE BESYLATE 5 MG/1
5 TABLET ORAL DAILY
Qty: 90 TABLET | Refills: 1 | Status: SHIPPED | OUTPATIENT
Start: 2024-05-03

## 2024-05-03 NOTE — PROGRESS NOTES
The ABCs of the Annual Wellness Visit  Subsequent Medicare Wellness Visit    Subjective    Mukesh Beard is a 83 y.o. male who presents for a Subsequent Medicare Wellness Visit.    Colonoscopy is no longer indicated by age and history.  Prostate cancer screening is no longer indicated by age.  He is UTD on COVID, Pneumovax (6/2015), Vivdxtp06 (11/2014), Zostavax (8/2010), Shingrix (4/6/2023, 9/2023) and flu (10/2023).  He is due for Hnfiien99, Tdap (2/2013) and RSV.  He is due for routine labs including HTN panel and CBC.       The following portions of the patient's history were reviewed and   updated as appropriate: allergies, current medications, past family history, past medical history, past social history, past surgical history, and problem list.    Compared to one year ago, the patient feels his physical   health is the same.    Compared to one year ago, the patient feels his mental   health is the same.    Recent Hospitalizations:  He was not admitted to the hospital during the last year.       Current Medical Providers:  Patient Care Team:  Alexandra Barnes MD as PCP - General (Family Medicine)  Shanta Braun MD as Consulting Physician (Urology)  Emir Proctor Jr., MD as Consulting Physician (Cardiology)    Outpatient Medications Prior to Visit   Medication Sig Dispense Refill    aspirin 81 MG tablet Take 1 tablet by mouth Every Night. Resume 4/14/18      cetirizine (zyrTEC) 10 MG tablet Take 1 tablet by mouth Daily. 90 tablet 3    Cholecalciferol (Vitamin D3) 50 MCG (2000 UT) capsule Take 1 capsule by mouth Every Night. (Patient taking differently: Take 1,000 capsules by mouth Every Night.) 90 capsule 3    dorzolamide-timolol (COSOPT) 22.3-6.8 MG/ML ophthalmic solution 1 drop 2 (Two) Times a Day.      lisinopril (PRINIVIL,ZESTRIL) 40 MG tablet TAKE 1 TABLET DAILY 90 tablet 1    meloxicam (MOBIC) 15 MG tablet TAKE 1 TABLET DAILY AS     NEEDED FOR MODERATE PAIN 90 tablet 1    niacin 500 MG  tablet Take 2 tablets by mouth Every Night. 180 tablet 3    amLODIPine (NORVASC) 5 MG tablet Take 1 tablet by mouth Daily. 90 tablet 1    carBAMazepine XR (TEGretol  XR) 100 MG 12 hr tablet Take 1 tablet by mouth 2 (Two) Times a Day. 180 tablet 1    famotidine (PEPCID) 40 MG tablet Take 1 tablet by mouth Daily. 90 tablet 1    metoprolol succinate XL (TOPROL-XL) 25 MG 24 hr tablet Take 0.5 tablets by mouth Daily. 45 tablet 1    montelukast (SINGULAIR) 10 MG tablet Take 1 tablet by mouth Every Night. 90 tablet 3    rosuvastatin (CRESTOR) 20 MG tablet Take 1 tablet by mouth Every Night. 90 tablet 1     No facility-administered medications prior to visit.       No opioid medication identified on active medication list. I have reviewed chart for other potential  high risk medication/s and harmful drug interactions in the elderly.        Aspirin is on active medication list. Aspirin use is indicated based on review of current medical condition/s. Pros and cons of this therapy have been discussed today. Benefits of this medication outweigh potential harm.  Patient has been encouraged to continue taking this medication.  .      Patient Active Problem List   Diagnosis    Essential hypertension    Coronary artery disease involving native coronary artery of native heart without angina pectoris    Bilateral primary osteoarthritis of knee    Mixed hyperlipidemia    Trigeminal neuralgia of left side of face    Gastroesophageal reflux disease without esophagitis    Chronic bilateral low back pain without sciatica    Glaucoma    Chronic allergic rhinitis    Carotid stenosis, asymptomatic, left     Advance Care Planning   Advance Care Planning     Advance Directive is not on file.  ACP discussion was held with the patient during this visit. Patient does not have an advance directive, information provided.     Objective    Vitals:    05/03/24 0824   BP: 161/73   BP Location: Left arm   Patient Position: Sitting   Pulse: (!) 44  "  Temp: 98.1 °F (36.7 °C)   TempSrc: Oral   Weight: 85.1 kg (187 lb 9.6 oz)   Height: 170.2 cm (67.01\")   PainSc: 0-No pain     Estimated body mass index is 29.38 kg/m² as calculated from the following:    Height as of this encounter: 170.2 cm (67.01\").    Weight as of this encounter: 85.1 kg (187 lb 9.6 oz).           Does the patient have evidence of cognitive impairment? No          HEALTH RISK ASSESSMENT    Smoking Status:  Social History     Tobacco Use   Smoking Status Former    Current packs/day: 0.00    Average packs/day: 1 pack/day for 10.0 years (10.0 ttl pk-yrs)    Types: Cigarettes    Start date: 1962    Quit date: 1972    Years since quittin.3   Smokeless Tobacco Never   Tobacco Comments    caffeine use-coffee     Alcohol Consumption:  Social History     Substance and Sexual Activity   Alcohol Use Yes    Comment: RARE     Fall Risk Screen:    STEADI Fall Risk Assessment was completed, and patient is at LOW risk for falls.Assessment completed on:5/3/2024    Depression Screenin/3/2024     8:22 AM   PHQ-2/PHQ-9 Depression Screening   Little Interest or Pleasure in Doing Things 0-->not at all   Feeling Down, Depressed or Hopeless 0-->not at all   PHQ-9: Brief Depression Severity Measure Score 0       Health Habits and Functional and Cognitive Screenin/3/2024     8:23 AM   Functional & Cognitive Status   Do you have difficulty preparing food and eating? No   Do you have difficulty bathing yourself, getting dressed or grooming yourself? No   Do you have difficulty using the toilet? No   Do you have difficulty moving around from place to place? No   Do you have trouble with steps or getting out of a bed or a chair? No   Current Diet Well Balanced Diet   Dental Exam Up to date   Eye Exam Up to date   Exercise (times per week) 7 times per week   Current Exercises Include Treadmill   Do you need help using the phone?  No   Are you deaf or do you have serious difficulty hearing?  No "   Do you need help to go to places out of walking distance? No   Do you need help shopping? No   Do you need help preparing meals?  No   Do you need help with housework?  No   Do you need help with laundry? No   Do you need help taking your medications? No   Do you need help managing money? No   Do you ever drive or ride in a car without wearing a seat belt? No   Have you felt unusual stress, anger or loneliness in the last month? No   Who do you live with? Spouse   If you need help, do you have trouble finding someone available to you? No   Have you been bothered in the last four weeks by sexual problems? No   Do you have difficulty concentrating, remembering or making decisions? No       Age-appropriate Screening Schedule:  Refer to the list below for future screening recommendations based on patient's age, sex and/or medical conditions. Orders for these recommended tests are listed in the plan section. The patient has been provided with a written plan.    Health Maintenance   Topic Date Due    RSV Vaccine - Adults (1 - 1-dose 60+ series) Never done    TDAP/TD VACCINES (2 - Td or Tdap) 02/01/2023    COVID-19 Vaccine (6 - 2023-24 season) 02/29/2024    INFLUENZA VACCINE  08/01/2024    LIPID PANEL  10/31/2024    ANNUAL WELLNESS VISIT  05/03/2025    BMI FOLLOWUP  05/03/2025    Pneumococcal Vaccine 65+  Completed    ZOSTER VACCINE  Completed                  CMS Preventative Services Quick Reference  Risk Factors Identified During Encounter  Immunizations Discussed/Encouraged: Tdap and RSV (Respiratory Syncytial Virus)  The above risks/problems have been discussed with the patient.  Pertinent information has been shared with the patient in the After Visit Summary.  An After Visit Summary and PPPS were made available to the patient.    Follow Up:   Next Medicare Wellness visit to be scheduled in 1 year.       Additional E&M Note during same encounter follows:  Patient has multiple medical problems which are significant  and separately identifiable that require additional work above and beyond the Medicare Wellness Visit.      Chief Complaint  Medicare Wellness-subsequent    Subjective        HPI  Mukesh Beard is also being seen today for routine follow-up.     He is on amlodipine, lisinopril, and metoprolol succinate for hypertension.  BP is typically well controlled at home, although he does frequently demonstrate whitecoat hypertension.  He is following with Cardiology.  No chest pain, palpitations or shortness of breath.  He is on rosuvastatin for hyperlipidemia and CAD.  No myalgias.  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  He is following with Cardiology Dr. Proctor.  Negative stress test in 2021.  Just had echo done back in the fall and this looked good.      He is on meloxicam for chronic shoulder and back pain for generalized osteoarthritis.      He is on Singulair and cetirizine for chronic allergies.      He is on carbamazepine for trigeminal neuralgia of the left face.  Pain has been well controlled.  No adverse effects.     He is on famotidine for GERD.  No heartburn or indigestion.     Follows with Roseann Ophbillieo for glaucoma.  They are planning to do an eyelash removal as they have been curling into his eyes.  That is scheduled for 5/29/2024.      Review of Systems   Constitutional:  Negative for chills, fatigue and fever.   HENT:  Negative for congestion, hearing loss and rhinorrhea.    Eyes:  Positive for visual disturbance. Negative for pain.   Respiratory:  Negative for cough and shortness of breath.    Cardiovascular:  Negative for chest pain and palpitations.   Gastrointestinal:  Negative for abdominal pain, constipation, diarrhea, nausea and vomiting.   Genitourinary:  Negative for difficulty urinating and dysuria.   Musculoskeletal:  Positive for arthralgias. Negative for myalgias.   Neurological:  Negative for weakness and numbness.   Psychiatric/Behavioral:  Negative for dysphoric mood and  "sleep disturbance. The patient is not nervous/anxious.        Objective   Vital Signs:  /73 (BP Location: Left arm, Patient Position: Sitting)   Pulse (!) 44   Temp 98.1 °F (36.7 °C) (Oral)   Ht 170.2 cm (67.01\")   Wt 85.1 kg (187 lb 9.6 oz)   BMI 29.38 kg/m²     Physical Exam  Vitals reviewed.   Constitutional:       General: He is not in acute distress.     Appearance: Normal appearance. He is well-developed.   HENT:      Head: Normocephalic and atraumatic.      Right Ear: External ear normal.      Left Ear: External ear normal.      Mouth/Throat:      Mouth: Mucous membranes are moist.   Eyes:      Extraocular Movements: Extraocular movements intact.      Conjunctiva/sclera: Conjunctivae normal.      Pupils: Pupils are equal, round, and reactive to light.   Cardiovascular:      Rate and Rhythm: Normal rate and regular rhythm.      Heart sounds: No murmur heard.  Pulmonary:      Effort: Pulmonary effort is normal.      Breath sounds: Normal breath sounds. No wheezing, rhonchi or rales.   Abdominal:      General: Bowel sounds are normal. There is no distension.      Palpations: Abdomen is soft.      Tenderness: There is no abdominal tenderness.   Musculoskeletal:         General: Normal range of motion.   Skin:     General: Skin is warm and dry.   Neurological:      Mental Status: He is alert and oriented to person, place, and time.      Deep Tendon Reflexes: Reflexes normal.   Psychiatric:         Mood and Affect: Mood and affect normal.         Behavior: Behavior normal.         Thought Content: Thought content normal.         Judgment: Judgment normal.                    Lab Results   Component Value Date    GLUCOSE 130 (H) 10/31/2023    BUN 13 10/31/2023    CREATININE 0.96 10/31/2023    EGFR 77.9 10/31/2023    BCR 13.5 10/31/2023    K 4.2 10/31/2023    CO2 24.5 10/31/2023    CALCIUM 9.0 10/31/2023    ALBUMIN 4.6 10/31/2023    BILITOT 0.5 10/31/2023    AST 22 10/31/2023    ALT 23 10/31/2023       Lab " Results   Component Value Date    CHOL 110 10/31/2023    CHLPL 108 04/23/2021    TRIG 132 10/31/2023    HDL 41 10/31/2023    LDL 46 10/31/2023       Lab Results   Component Value Date    WBC 5.62 10/31/2023    HGB 14.6 10/31/2023    HCT 41.8 10/31/2023    MCV 91.1 10/31/2023     10/31/2023        Assessment and Plan   Assessment & Plan  Annual physical exam  Colonoscopy is no longer indicated by age and history.  Prostate cancer screening is no longer indicated by age.  He is UTD on COVID, Pneumovax (6/2015), Suslkfm14 (11/2014), Zostavax (8/2010), Shingrix (4/6/2023, 9/2023) and flu (10/2023).  He is due for Euxbkcw50, Tdap (2/2013) and RSV.  Tdap and RSV can be done at the pharmacy.  Limvfne10 given today.  COVID can be updated at his preference.  He is due for routine labs including HTN panel and CBC; ordered.     Essential hypertension  Blood pressure has been running at goal.  Continue Motifene 5 mg daily and lisinopril 40 mg daily.  Refills were needed today.  Labs were needed today.  Continue to monitor.   Mixed hyperlipidemia   Stable on rosuvastatin 20 mg daily.  Refills were needed today.  Labs were due today.  Continue to monitor.   Coronary artery disease involving native coronary artery of native heart without angina pectoris  Stable on ASA, statin, ACEI.  Following with Cardiology.    Trigeminal neuralgia of left side of face  Stable on carbamazepine 100 mg twice daily.  Refills were needed today.  Continue to monitor.   Gastroesophageal reflux disease without esophagitis  Stable on famotidine 40 mg daily.  Refills were not needed today.  Continue to monitor.   Chronic allergic rhinitis  Stable on montelukast 10 mg daily.  Refills were needed today.  Continue to monitor.   Glaucoma of both eyes, unspecified glaucoma type  Going for eyelash removal surgery.  They did send paperwork requesting clearance to stop both meloxicam and aspirin for 14 days prior to surgery.  I have signed off on that  today given that his CABG was over 20 years ago and he has been stable with a negative stress test as recently as 3 years ago.  Encounter for immunization      Orders Placed This Encounter   Procedures    Pneumococcal Conjugate Vaccine 20-Valent All    Comprehensive Metabolic Panel    Lipid Panel    CBC Auto Differential     New Medications Ordered This Visit   Medications    amLODIPine (NORVASC) 5 MG tablet     Sig: Take 1 tablet by mouth Daily.     Dispense:  90 tablet     Refill:  1    carBAMazepine XR (TEGretol  XR) 100 MG 12 hr tablet     Sig: Take 1 tablet by mouth 2 (Two) Times a Day.     Dispense:  180 tablet     Refill:  1    famotidine (PEPCID) 40 MG tablet     Sig: Take 1 tablet by mouth Daily.     Dispense:  90 tablet     Refill:  1    metoprolol succinate XL (TOPROL-XL) 25 MG 24 hr tablet     Sig: Take 0.5 tablets by mouth Daily.     Dispense:  45 tablet     Refill:  1    rosuvastatin (CRESTOR) 20 MG tablet     Sig: Take 1 tablet by mouth Every Night.     Dispense:  90 tablet     Refill:  1    montelukast (SINGULAIR) 10 MG tablet     Sig: Take 1 tablet by mouth Every Night.     Dispense:  90 tablet     Refill:  3            Follow Up   Return in about 6 months (around 11/3/2024) for Recheck.  Patient was given instructions and counseling regarding his condition or for health maintenance advice. Please see specific information pulled into the AVS if appropriate.

## 2024-05-03 NOTE — ASSESSMENT & PLAN NOTE
Blood pressure has been running at goal.  Continue Motifene 5 mg daily and lisinopril 40 mg daily.  Refills were needed today.  Labs were needed today.  Continue to monitor.

## 2024-05-03 NOTE — ASSESSMENT & PLAN NOTE
Going for eyelash removal surgery.  They did send paperwork requesting clearance to stop both meloxicam and aspirin for 14 days prior to surgery.  I have signed off on that today given that his CABG was over 20 years ago and he has been stable with a negative stress test as recently as 3 years ago.

## 2024-06-19 DIAGNOSIS — I10 ESSENTIAL HYPERTENSION: ICD-10-CM

## 2024-06-19 RX ORDER — METOPROLOL SUCCINATE 25 MG/1
12.5 TABLET, EXTENDED RELEASE ORAL DAILY
Qty: 45 TABLET | Refills: 1 | OUTPATIENT
Start: 2024-06-19

## 2024-06-25 DIAGNOSIS — I10 ESSENTIAL HYPERTENSION: ICD-10-CM

## 2024-06-25 RX ORDER — METOPROLOL SUCCINATE 25 MG/1
12.5 TABLET, EXTENDED RELEASE ORAL DAILY
Qty: 45 TABLET | Refills: 1 | OUTPATIENT
Start: 2024-06-25

## 2024-11-05 ENCOUNTER — OFFICE VISIT (OUTPATIENT)
Dept: FAMILY MEDICINE CLINIC | Age: 84
End: 2024-11-05
Payer: MEDICARE

## 2024-11-05 VITALS
SYSTOLIC BLOOD PRESSURE: 168 MMHG | OXYGEN SATURATION: 94 % | TEMPERATURE: 98.1 F | BODY MASS INDEX: 29.32 KG/M2 | HEART RATE: 51 BPM | HEIGHT: 67 IN | WEIGHT: 186.8 LBS | DIASTOLIC BLOOD PRESSURE: 87 MMHG

## 2024-11-05 DIAGNOSIS — Z23 ENCOUNTER FOR IMMUNIZATION: ICD-10-CM

## 2024-11-05 DIAGNOSIS — I10 ESSENTIAL HYPERTENSION: Primary | ICD-10-CM

## 2024-11-05 DIAGNOSIS — K21.9 GASTROESOPHAGEAL REFLUX DISEASE WITHOUT ESOPHAGITIS: ICD-10-CM

## 2024-11-05 DIAGNOSIS — I25.10 CORONARY ARTERY DISEASE INVOLVING NATIVE CORONARY ARTERY OF NATIVE HEART WITHOUT ANGINA PECTORIS: ICD-10-CM

## 2024-11-05 DIAGNOSIS — G50.0 TRIGEMINAL NEURALGIA OF LEFT SIDE OF FACE: ICD-10-CM

## 2024-11-05 DIAGNOSIS — E78.2 MIXED HYPERLIPIDEMIA: ICD-10-CM

## 2024-11-05 DIAGNOSIS — M54.50 CHRONIC BILATERAL LOW BACK PAIN WITHOUT SCIATICA: ICD-10-CM

## 2024-11-05 DIAGNOSIS — G89.29 CHRONIC BILATERAL LOW BACK PAIN WITHOUT SCIATICA: ICD-10-CM

## 2024-11-05 DIAGNOSIS — H40.9 GLAUCOMA OF BOTH EYES, UNSPECIFIED GLAUCOMA TYPE: ICD-10-CM

## 2024-11-05 DIAGNOSIS — M17.0 BILATERAL PRIMARY OSTEOARTHRITIS OF KNEE: ICD-10-CM

## 2024-11-05 PROCEDURE — 90662 IIV NO PRSV INCREASED AG IM: CPT | Performed by: FAMILY MEDICINE

## 2024-11-05 PROCEDURE — 91320 SARSCV2 VAC 30MCG TRS-SUC IM: CPT | Performed by: FAMILY MEDICINE

## 2024-11-05 PROCEDURE — 1159F MED LIST DOCD IN RCRD: CPT | Performed by: FAMILY MEDICINE

## 2024-11-05 PROCEDURE — 3077F SYST BP >= 140 MM HG: CPT | Performed by: FAMILY MEDICINE

## 2024-11-05 PROCEDURE — 3079F DIAST BP 80-89 MM HG: CPT | Performed by: FAMILY MEDICINE

## 2024-11-05 PROCEDURE — 1160F RVW MEDS BY RX/DR IN RCRD: CPT | Performed by: FAMILY MEDICINE

## 2024-11-05 PROCEDURE — 1126F AMNT PAIN NOTED NONE PRSNT: CPT | Performed by: FAMILY MEDICINE

## 2024-11-05 PROCEDURE — G0008 ADMIN INFLUENZA VIRUS VAC: HCPCS | Performed by: FAMILY MEDICINE

## 2024-11-05 PROCEDURE — 90480 ADMN SARSCOV2 VAC 1/ONLY CMP: CPT | Performed by: FAMILY MEDICINE

## 2024-11-05 PROCEDURE — 99214 OFFICE O/P EST MOD 30 MIN: CPT | Performed by: FAMILY MEDICINE

## 2024-11-05 RX ORDER — AMLODIPINE BESYLATE 5 MG/1
5 TABLET ORAL DAILY
Qty: 90 TABLET | Refills: 1 | Status: SHIPPED | OUTPATIENT
Start: 2024-11-05

## 2024-11-05 RX ORDER — METOPROLOL SUCCINATE 25 MG/1
12.5 TABLET, EXTENDED RELEASE ORAL DAILY
Qty: 45 TABLET | Refills: 1 | Status: SHIPPED | OUTPATIENT
Start: 2024-11-05

## 2024-11-05 RX ORDER — CARBAMAZEPINE 100 MG/1
100 TABLET, EXTENDED RELEASE ORAL 2 TIMES DAILY
Qty: 180 TABLET | Refills: 1 | Status: SHIPPED | OUTPATIENT
Start: 2024-11-05

## 2024-11-05 RX ORDER — LISINOPRIL 40 MG/1
40 TABLET ORAL DAILY
Qty: 90 TABLET | Refills: 1 | Status: SHIPPED | OUTPATIENT
Start: 2024-11-05

## 2024-11-05 RX ORDER — MELOXICAM 15 MG/1
15 TABLET ORAL DAILY PRN
Qty: 90 TABLET | Refills: 1 | Status: SHIPPED | OUTPATIENT
Start: 2024-11-05

## 2024-11-05 RX ORDER — FAMOTIDINE 40 MG/1
40 TABLET, FILM COATED ORAL DAILY
Qty: 90 TABLET | Refills: 1 | Status: SHIPPED | OUTPATIENT
Start: 2024-11-05

## 2024-11-05 RX ORDER — ROSUVASTATIN CALCIUM 20 MG/1
20 TABLET, COATED ORAL NIGHTLY
Qty: 90 TABLET | Refills: 1 | Status: SHIPPED | OUTPATIENT
Start: 2024-11-05

## 2024-11-05 NOTE — ASSESSMENT & PLAN NOTE
Orders:    meloxicam (MOBIC) 15 MG tablet; Take 1 tablet by mouth Daily As Needed for Moderate Pain.

## 2024-11-05 NOTE — ASSESSMENT & PLAN NOTE
Stable on famotidine 40 mg daily.  Refills were not needed today.  Continue to monitor.   Orders:    famotidine (PEPCID) 40 MG tablet; Take 1 tablet by mouth Daily.

## 2024-11-05 NOTE — PROGRESS NOTES
Chief Complaint  Hypertension    Subjective          Mukesh Beard presents to [facility] today for follow-up on chronic issues.    He has had some mild URI sx for which he has been taking Coricidin.      He is on amlodipine, lisinopril, and metoprolol succinate for HTN.  He reports that his blood pressure is typically well controlled at home, although he does frequently demonstrate whitecoat HTN here.  Follows with Cardiology Dr. Proctor.  Denies chest pain, palpitations or shortness of breath.  He is on rosuvastatin for HLD and CAD.  Denies myalgias.  History of CAD, s/p 5 v. CABG (>20 years ago).  Negative stress test was last done 2021.     He is on carbamazepine for trigeminal neuralgia, L face.  His pain has been well controlled.  Denies adverse effects.     He is on famotidine for GERD.  Denies reflux or heartburn.    He is on meloxicam for chronic shoulder and back pain for diffuse OA.     He follows with Roseann Floreso for glaucoma.    He is on montelukast and cetirizine for chronic allergies.      Current Outpatient Medications:   •  amLODIPine (NORVASC) 5 MG tablet, Take 1 tablet by mouth Daily., Disp: 90 tablet, Rfl: 1  •  aspirin 81 MG tablet, Take 1 tablet by mouth Every Night. Resume 4/14/18, Disp: , Rfl:   •  carBAMazepine XR (TEGretol  XR) 100 MG 12 hr tablet, Take 1 tablet by mouth 2 (Two) Times a Day., Disp: 180 tablet, Rfl: 1  •  cetirizine (zyrTEC) 10 MG tablet, Take 1 tablet by mouth Daily., Disp: 90 tablet, Rfl: 3  •  Cholecalciferol (Vitamin D3) 50 MCG (2000 UT) capsule, Take 1 capsule by mouth Every Night. (Patient taking differently: Take 1,000 capsules by mouth Every Night.), Disp: 90 capsule, Rfl: 3  •  dorzolamide-timolol (COSOPT) 22.3-6.8 MG/ML ophthalmic solution, 1 drop 2 (Two) Times a Day., Disp: , Rfl:   •  famotidine (PEPCID) 40 MG tablet, Take 1 tablet by mouth Daily., Disp: 90 tablet, Rfl: 1  •  lisinopril (PRINIVIL,ZESTRIL) 40 MG  "tablet, Take 1 tablet by mouth Daily., Disp: 90 tablet, Rfl: 1  •  meloxicam (MOBIC) 15 MG tablet, Take 1 tablet by mouth Daily As Needed for Moderate Pain., Disp: 90 tablet, Rfl: 1  •  metoprolol succinate XL (TOPROL-XL) 25 MG 24 hr tablet, Take 0.5 tablets by mouth Daily., Disp: 45 tablet, Rfl: 1  •  montelukast (SINGULAIR) 10 MG tablet, Take 1 tablet by mouth Every Night., Disp: 90 tablet, Rfl: 3  •  niacin 500 MG tablet, Take 2 tablets by mouth Every Night., Disp: 180 tablet, Rfl: 3  •  rosuvastatin (CRESTOR) 20 MG tablet, Take 1 tablet by mouth Every Night., Disp: 90 tablet, Rfl: 1  Medications Discontinued During This Encounter   Medication Reason   • lisinopril (PRINIVIL,ZESTRIL) 40 MG tablet Reorder   • meloxicam (MOBIC) 15 MG tablet Reorder   • amLODIPine (NORVASC) 5 MG tablet Reorder   • carBAMazepine XR (TEGretol  XR) 100 MG 12 hr tablet Reorder   • famotidine (PEPCID) 40 MG tablet Reorder   • metoprolol succinate XL (TOPROL-XL) 25 MG 24 hr tablet Reorder   • rosuvastatin (CRESTOR) 20 MG tablet Reorder         Allergies:  Benzalkonium      Objective   Vital Signs:   Vitals:    11/05/24 0901   BP: 151/80   BP Location: Left arm   Patient Position: Sitting   Cuff Size: Adult   Pulse: 51   Temp: 98.1 °F (36.7 °C)   TempSrc: Oral   SpO2: 94%   Weight: 84.7 kg (186 lb 12.8 oz)   Height: 170.2 cm (67.01\")     Body mass index is 29.25 kg/m².           Physical Exam  Vitals reviewed.   Constitutional:       General: He is not in acute distress.     Appearance: Normal appearance. He is well-developed.   HENT:      Head: Normocephalic and atraumatic.      Right Ear: External ear normal.      Left Ear: External ear normal.   Eyes:      Extraocular Movements: Extraocular movements intact.      Conjunctiva/sclera: Conjunctivae normal.      Pupils: Pupils are equal, round, and reactive to light.   Cardiovascular:      Rate and Rhythm: Normal rate and regular rhythm.      Heart sounds: No murmur heard.  Pulmonary:      " Effort: Pulmonary effort is normal.      Breath sounds: Normal breath sounds. No wheezing, rhonchi or rales.   Abdominal:      General: Bowel sounds are normal. There is no distension.      Palpations: Abdomen is soft.      Tenderness: There is no abdominal tenderness.   Musculoskeletal:         General: Normal range of motion.   Neurological:      Mental Status: He is alert.   Psychiatric:         Mood and Affect: Affect normal.         Lab Results   Component Value Date    GLUCOSE 127 (H) 05/03/2024    BUN 20 05/03/2024    CREATININE 0.97 05/03/2024    EGFRIFNONA 81 10/27/2021    BCR 20.6 05/03/2024    K 4.3 05/03/2024    CO2 27.0 05/03/2024    CALCIUM 9.2 05/03/2024    ALBUMIN 4.5 05/03/2024    LABIL2 1.9 04/23/2021    AST 20 05/03/2024    ALT 22 05/03/2024       Lab Results   Component Value Date    CHOL 116 05/03/2024    CHLPL 108 04/23/2021    TRIG 162 (H) 05/03/2024    HDL 37 (L) 05/03/2024    LDL 52 05/03/2024       Lab Results   Component Value Date    WBC 5.70 05/03/2024    HGB 15.1 05/03/2024    HCT 44.3 05/03/2024    MCV 93.3 05/03/2024     05/03/2024            Assessment and Plan    Assessment & Plan  Essential hypertension    Blood pressure has been running at goal.  Continue amlodipine 5 mg daily, lisinopril 40 mg daily and metoprolol succinate 12.5 mg daily.  Refills were needed today.  Labs were not needed today.  Continue to monitor.  Orders:  •  amLODIPine (NORVASC) 5 MG tablet; Take 1 tablet by mouth Daily.  •  lisinopril (PRINIVIL,ZESTRIL) 40 MG tablet; Take 1 tablet by mouth Daily.  •  metoprolol succinate XL (TOPROL-XL) 25 MG 24 hr tablet; Take 0.5 tablets by mouth Daily.    Mixed hyperlipidemia     Stable on rosuvastatin 20 mg daily.  Refills were needed today.  Labs were not due today.  Continue to monitor.  Orders:  •  rosuvastatin (CRESTOR) 20 MG tablet; Take 1 tablet by mouth Every Night.    Coronary artery disease involving native coronary artery of native heart without angina  pectoris    Stable on ASA, statin, ACEI.  Following with Cardiology.         Trigeminal neuralgia of left side of face  Stable on carbamazepine 100 mg twice daily.  Refills were not needed today.  Continue to monitor.  Orders:  •  carBAMazepine XR (TEGretol  XR) 100 MG 12 hr tablet; Take 1 tablet by mouth 2 (Two) Times a Day.    Gastroesophageal reflux disease without esophagitis  Stable on famotidine 40 mg daily.  Refills were not needed today.  Continue to monitor.   Orders:  •  famotidine (PEPCID) 40 MG tablet; Take 1 tablet by mouth Daily.    Bilateral primary osteoarthritis of knee  Stable on meloxicam 15 mg daily.  Refills were not needed today.  Continue to monitor.       Glaucoma of both eyes, unspecified glaucoma type  Following with Roseann.       Chronic bilateral low back pain without sciatica    Orders:  •  meloxicam (MOBIC) 15 MG tablet; Take 1 tablet by mouth Daily As Needed for Moderate Pain.    Encounter for immunization    Orders:  •  COVID-19 (Pfizer) 12yrs+ (COMIRNATY)  •  Fluzone High-Dose 65+yrs        Follow Up   Return in about 6 months (around 5/5/2025) for Medicare Wellness.  Patient was given instructions and counseling regarding his condition or for health maintenance advice. Please see specific information pulled into the AVS if appropriate.           11/05/2024

## 2024-11-05 NOTE — ASSESSMENT & PLAN NOTE
Blood pressure has been running at goal.  Continue amlodipine 5 mg daily, lisinopril 40 mg daily and metoprolol succinate 12.5 mg daily.  Refills were needed today.  Labs were not needed today.  Continue to monitor.  Orders:    amLODIPine (NORVASC) 5 MG tablet; Take 1 tablet by mouth Daily.    lisinopril (PRINIVIL,ZESTRIL) 40 MG tablet; Take 1 tablet by mouth Daily.    metoprolol succinate XL (TOPROL-XL) 25 MG 24 hr tablet; Take 0.5 tablets by mouth Daily.

## 2024-11-05 NOTE — ASSESSMENT & PLAN NOTE
Stable on carbamazepine 100 mg twice daily.  Refills were not needed today.  Continue to monitor.  Orders:    carBAMazepine XR (TEGretol  XR) 100 MG 12 hr tablet; Take 1 tablet by mouth 2 (Two) Times a Day.

## 2024-11-05 NOTE — ASSESSMENT & PLAN NOTE
Stable on rosuvastatin 20 mg daily.  Refills were needed today.  Labs were not due today.  Continue to monitor.  Orders:    rosuvastatin (CRESTOR) 20 MG tablet; Take 1 tablet by mouth Every Night.

## 2024-11-12 DIAGNOSIS — K21.9 GASTROESOPHAGEAL REFLUX DISEASE WITHOUT ESOPHAGITIS: ICD-10-CM

## 2024-11-12 DIAGNOSIS — M54.50 CHRONIC BILATERAL LOW BACK PAIN WITHOUT SCIATICA: ICD-10-CM

## 2024-11-12 DIAGNOSIS — G50.0 TRIGEMINAL NEURALGIA OF LEFT SIDE OF FACE: ICD-10-CM

## 2024-11-12 DIAGNOSIS — E78.2 MIXED HYPERLIPIDEMIA: ICD-10-CM

## 2024-11-12 DIAGNOSIS — G89.29 CHRONIC BILATERAL LOW BACK PAIN WITHOUT SCIATICA: ICD-10-CM

## 2024-11-12 DIAGNOSIS — I10 ESSENTIAL HYPERTENSION: ICD-10-CM

## 2024-11-12 RX ORDER — MELOXICAM 15 MG/1
15 TABLET ORAL DAILY PRN
Qty: 90 TABLET | Refills: 1 | Status: SHIPPED | OUTPATIENT
Start: 2024-11-12

## 2024-11-12 RX ORDER — CARBAMAZEPINE 100 MG/1
100 TABLET, EXTENDED RELEASE ORAL 2 TIMES DAILY
Qty: 180 TABLET | Refills: 1 | Status: SHIPPED | OUTPATIENT
Start: 2024-11-12

## 2024-11-12 RX ORDER — AMLODIPINE BESYLATE 5 MG/1
5 TABLET ORAL DAILY
Qty: 90 TABLET | Refills: 1 | Status: SHIPPED | OUTPATIENT
Start: 2024-11-12

## 2024-11-12 RX ORDER — LISINOPRIL 40 MG/1
40 TABLET ORAL DAILY
Qty: 90 TABLET | Refills: 1 | Status: SHIPPED | OUTPATIENT
Start: 2024-11-12

## 2024-11-12 RX ORDER — METOPROLOL SUCCINATE 25 MG/1
12.5 TABLET, EXTENDED RELEASE ORAL DAILY
Qty: 45 TABLET | Refills: 1 | Status: SHIPPED | OUTPATIENT
Start: 2024-11-12

## 2024-11-12 RX ORDER — FAMOTIDINE 40 MG/1
40 TABLET, FILM COATED ORAL DAILY
Qty: 90 TABLET | Refills: 1 | Status: SHIPPED | OUTPATIENT
Start: 2024-11-12

## 2024-11-12 RX ORDER — ROSUVASTATIN CALCIUM 20 MG/1
20 TABLET, COATED ORAL NIGHTLY
Qty: 90 TABLET | Refills: 1 | Status: SHIPPED | OUTPATIENT
Start: 2024-11-12

## 2024-11-12 NOTE — TELEPHONE ENCOUNTER
"Caller: Mukesh Beard EVER \"Kiran\"    Relationship: Self    Best call back number:     [phone]        Requested Prescriptions:   Requested Prescriptions     Pending Prescriptions Disp Refills    rosuvastatin (CRESTOR) 20 MG tablet 90 tablet 1     Sig: Take 1 tablet by mouth Every Night.    metoprolol succinate XL (TOPROL-XL) 25 MG 24 hr tablet 45 tablet 1     Sig: Take 0.5 tablets by mouth Daily.    lisinopril (PRINIVIL,ZESTRIL) 40 MG tablet 90 tablet 1     Sig: Take 1 tablet by mouth Daily.    famotidine (PEPCID) 40 MG tablet 90 tablet 1     Sig: Take 1 tablet by mouth Daily.    carBAMazepine XR (TEGretol  XR) 100 MG 12 hr tablet 180 tablet 1     Sig: Take 1 tablet by mouth 2 (Two) Times a Day.    meloxicam (MOBIC) 15 MG tablet 90 tablet 1     Sig: Take 1 tablet by mouth Daily As Needed for Moderate Pain.    amLODIPine (NORVASC) 5 MG tablet 90 tablet 1     Sig: Take 1 tablet by mouth Daily.        [facility]arm[facility] - [facility] AT PORTAL TO REGISTERED [facility] SITES - [phone]  - [phone] FX     Last office visit with prescribing clinician: 11/5/2024   Last telemedicine visit with prescribing clinician: Visit date not found   Next office visit with prescribing clinician: 5/6/2025     Additional details provided by patient: SENT TO INCORRECT PHARMACY.     Does the patient have less than a 3 day supply:  [x] Yes  [] No    Would you like a call back once the refill request has been completed: [] Yes [x] No    If the office needs to give you a call back, can they leave a voicemail: [] Yes [x] No    Amy Holguin Rep   11/12/24 10:30 EST           "

## 2024-12-06 ENCOUNTER — OFFICE VISIT (OUTPATIENT)
Dept: CARDIOLOGY | Facility: CLINIC | Age: 84
End: 2024-12-06
Payer: MEDICARE

## 2024-12-06 VITALS
DIASTOLIC BLOOD PRESSURE: 74 MMHG | SYSTOLIC BLOOD PRESSURE: 138 MMHG | HEART RATE: 52 BPM | BODY MASS INDEX: 29.35 KG/M2 | WEIGHT: 187 LBS | HEIGHT: 67 IN

## 2024-12-06 DIAGNOSIS — I25.10 CORONARY ARTERY DISEASE INVOLVING NATIVE CORONARY ARTERY OF NATIVE HEART WITHOUT ANGINA PECTORIS: ICD-10-CM

## 2024-12-06 DIAGNOSIS — E78.2 MIXED HYPERLIPIDEMIA: Primary | ICD-10-CM

## 2024-12-06 DIAGNOSIS — I10 ESSENTIAL HYPERTENSION: ICD-10-CM

## 2024-12-06 DIAGNOSIS — I65.22 CAROTID STENOSIS, ASYMPTOMATIC, LEFT: ICD-10-CM

## 2024-12-06 PROCEDURE — 99214 OFFICE O/P EST MOD 30 MIN: CPT | Performed by: NURSE PRACTITIONER

## 2024-12-06 PROCEDURE — 93000 ELECTROCARDIOGRAM COMPLETE: CPT | Performed by: NURSE PRACTITIONER

## 2024-12-06 PROCEDURE — 3078F DIAST BP <80 MM HG: CPT | Performed by: NURSE PRACTITIONER

## 2024-12-06 PROCEDURE — 1159F MED LIST DOCD IN RCRD: CPT | Performed by: NURSE PRACTITIONER

## 2024-12-06 PROCEDURE — 3075F SYST BP GE 130 - 139MM HG: CPT | Performed by: NURSE PRACTITIONER

## 2024-12-06 PROCEDURE — 1160F RVW MEDS BY RX/DR IN RCRD: CPT | Performed by: NURSE PRACTITIONER

## 2024-12-06 NOTE — PROGRESS NOTES
Subjective:     Encounter Date:12/06/2024      Patient ID: Mukesh Beard is a 84 y.o. male.    Chief Complaint: Follow-up CAD  History of Present Illness  This is an 84-year-old man who follows with Dr. Bray is new to me today.  He has a past medical history of coronary artery disease status post CABG in 2000, hypertension, hyperlipidemia and carotid artery stenosis.    He presents today for 1 year follow-up.  He tells me that he has been having some episodes of chest discomfort at times it feels like a sharp pain.  He says that the discomfort has not been bad enough that he feels that it is warranted going to the emergency room but he is getting quite concerned about it.  He denies any radiation of the pain or associated symptoms.  It does not happen every day.  He did not have any type of discomfort prior to requiring open heart surgery.  He denies any shortness of breath, palpitations, dizziness or syncope.  No swelling in his legs, orthopnea or PND.  Blood pressures have been pretty well-controlled.     I have reviewed and updated as appropriate allergies, current medications, past family history, past medical history, past surgical history and problem list.    Review of Systems   Constitutional: Negative for fever, malaise/fatigue, weight gain and weight loss.   HENT:  Negative for congestion, hoarse voice and sore throat.    Eyes:  Negative for blurred vision and double vision.   Cardiovascular:  Positive for chest pain. Negative for dyspnea on exertion, leg swelling, orthopnea, palpitations and syncope.   Respiratory:  Negative for cough, shortness of breath and wheezing.    Gastrointestinal:  Negative for abdominal pain, hematemesis, hematochezia and melena.   Genitourinary:  Negative for dysuria and hematuria.   Neurological:  Negative for dizziness, headaches, light-headedness and numbness.   Psychiatric/Behavioral:  Negative for depression. The patient is not nervous/anxious.          Current  Outpatient Medications:     amLODIPine (NORVASC) 5 MG tablet, Take 1 tablet by mouth Daily., Disp: 90 tablet, Rfl: 1    aspirin 81 MG tablet, Take 1 tablet by mouth Every Night. Resume 4/14/18, Disp: , Rfl:     carBAMazepine XR (TEGretol  XR) 100 MG 12 hr tablet, Take 1 tablet by mouth 2 (Two) Times a Day., Disp: 180 tablet, Rfl: 1    cetirizine (zyrTEC) 10 MG tablet, Take 1 tablet by mouth Daily., Disp: 90 tablet, Rfl: 3    Cholecalciferol (Vitamin D3) 50 MCG (2000 UT) capsule, Take 1 capsule by mouth Every Night. (Patient taking differently: Take 1,000 capsules by mouth Every Night.), Disp: 90 capsule, Rfl: 3    dorzolamide-timolol (COSOPT) 22.3-6.8 MG/ML ophthalmic solution, 1 drop 2 (Two) Times a Day., Disp: , Rfl:     famotidine (PEPCID) 40 MG tablet, Take 1 tablet by mouth Daily., Disp: 90 tablet, Rfl: 1    lisinopril (PRINIVIL,ZESTRIL) 40 MG tablet, Take 1 tablet by mouth Daily., Disp: 90 tablet, Rfl: 1    meloxicam (MOBIC) 15 MG tablet, Take 1 tablet by mouth Daily As Needed for Moderate Pain., Disp: 90 tablet, Rfl: 1    metoprolol succinate XL (TOPROL-XL) 25 MG 24 hr tablet, Take 0.5 tablets by mouth Daily., Disp: 45 tablet, Rfl: 1    montelukast (SINGULAIR) 10 MG tablet, Take 1 tablet by mouth Every Night., Disp: 90 tablet, Rfl: 3    niacin 500 MG tablet, Take 2 tablets by mouth Every Night., Disp: 180 tablet, Rfl: 3    rosuvastatin (CRESTOR) 20 MG tablet, Take 1 tablet by mouth Every Night., Disp: 90 tablet, Rfl: 1    Past Medical History:   Diagnosis Date    Allergic benzolkonium chloride    Anesthesia complication     son woke up during surgery  knee replacement    Ankylosing spondylitis of site in spine     Arthritis     ASCVD (arteriosclerotic cardiovascular disease)     mild lv dysfunction    Carotid stenosis, asymptomatic, left 11/30/2023    Closed nondisplaced fracture of lateral end of right clavicle 05/10/2022    Coronary artery disease 05/2000    GERD (gastroesophageal reflux disease)      Glaucoma     Hyperlipidemia     Hypertension     Kidney stone     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)     Myocardial infarction     Osteoarthritis     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 MAIN OR;  Service: Orthopedics    URETEROSCOPY LASER LITHOTRIPSY WITH STENT INSERTION Left 2022    Procedure: CYSTOSCOPY URETEROSCOPY BASKET STONE EXTRACTION WITH STENT INSERTION;  Surgeon: Shanta Braun MD;  Location: [facility] MAIN OR;  Service: Urology;  Laterality: Left;       Family History   Problem Relation Age of Onset    Heart disease Mother         valvular heart disease    Malig Hyperthermia Neg Hx        Social History     Tobacco Use    Smoking status: Former     Current packs/day: 0.00     Average packs/day: 1 pack/day for 10.0 years (10.0 ttl pk-yrs)     Types: Cigarettes     Start date: 1962     Quit date: 1972     Years since quittin.9    Smokeless tobacco: Never    Tobacco comments:     caffeine use-coffee   Vaping Use    Vaping status: Never Used   Substance Use Topics    Alcohol use: Not Currently     Comment: RARE    Drug use: No         ECG 12 Lead    Date/Time: 2024 3:50 PM  Performed by: Vianey Cooper APRN    Authorized by: Vianey Cooper APRN  Comparison: compared with previous ECG from 2024  Similar to previous ECG  Rhythm: sinus rhythm  Conduction: right bundle branch block and 1st  "degree AV block             Objective:     Visit Vitals  /74 (BP Location: Right arm, Patient Position: Sitting, Cuff Size: Adult)   Pulse 52   Ht 170.2 cm (67\")   Wt 84.8 kg (187 lb)   BMI 29.29 kg/m²             Physical Exam  Constitutional:       Appearance: Normal appearance. He is normal weight.   HENT:      Head: Normocephalic.   Neck:      Vascular: Carotid bruit present.   Cardiovascular:      Rate and Rhythm: Normal rate and regular rhythm.      Chest Wall: PMI is not displaced.      Pulses: Normal pulses.           Radial pulses are 2+ on the right side and 2+ on the left side.        Posterior tibial pulses are 2+ on the right side and 2+ on the left side.      Heart sounds: Normal heart sounds. No murmur heard.     No friction rub. No gallop.   Pulmonary:      Effort: Pulmonary effort is normal.      Breath sounds: Normal breath sounds.   Abdominal:      General: Bowel sounds are normal. There is no distension.      Palpations: Abdomen is soft.   Musculoskeletal:      Right lower leg: No edema.      Left lower leg: No edema.   Skin:     General: Skin is warm and dry.      Capillary Refill: Capillary refill takes less than 2 seconds.   Neurological:      Mental Status: He is alert and oriented to person, place, and time.   Psychiatric:         Mood and Affect: Mood normal.         Behavior: Behavior normal.         Thought Content: Thought content normal.          Lab Review:   Lipid Panel          5/3/2024    09:30   Lipid Panel   Total Cholesterol 116    Triglycerides 162    HDL Cholesterol 37    VLDL Cholesterol 27    LDL Cholesterol  52    LDL/HDL Ratio 1.26          Cardiac Procedures:       Assessment:         Diagnoses and all orders for this visit:    1. Mixed hyperlipidemia (Primary)    2. Essential hypertension    3. Coronary artery disease involving native coronary artery of native heart without angina pectoris  -     Stress Test With Myocardial Perfusion One Day; Future    4. Carotid " stenosis, asymptomatic, left            Plan:       CAD: History of CABG in 2000.  EKG is stable with no new ischemic changes.  He is having some atypical chest discomfort.  He did not have any anginal symptoms prior to open heart surgery.  He had a stress test in 2021 that was nonischemic.I gave him the option to monitor his symptoms and notify me if they worsen versus moving forward with a stress test and he prefers the latter.  Will arrange for a nuclear treadmill stress test.  Hypertension: Blood pressure well-controlled on current regimen.  No changes  Hyperlipidemia: On statin therapy.  Goal LDL less than 70.  Most recent LDL was 52 in May.  Carotid artery stenosis: He has  pretty prominent right and left carotid bruits.  Ultrasound performed in 2023 showed no significant stenosis of either side which was unchanged from previous ultrasounds.  No complaints of dizziness or syncope.  Will monitor    Thank you for allowing me to participate in this patient's care. Please call with any questions or concerns. Mr. Beard will follow up with    Dr. Proctor in 6 months.       Your medication list            Accurate as of December 6, 2024  3:10 PM. If you have any questions, ask your nurse or doctor.                CHANGE how you take these medications        Instructions Last Dose Given Next Dose Due   Vitamin D3 50 MCG (2000 UT) capsule  What changed: how much to take      Take 1 capsule by mouth Every Night.              CONTINUE taking these medications        Instructions Last Dose Given Next Dose Due   amLODIPine 5 MG tablet  Commonly known as: NORVASC      Take 1 tablet by mouth Daily.       aspirin 81 MG tablet      Take 1 tablet by mouth Every Night. Resume 4/14/18       carBAMazepine  MG 12 hr tablet  Commonly known as: TEGretol  XR      Take 1 tablet by mouth 2 (Two) Times a Day.       cetirizine 10 MG tablet  Commonly known as: zyrTEC      Take 1 tablet by mouth Daily.       dorzolamide-timolol  2-0.5 % ophthalmic solution  Commonly known as: COSOPT      1 drop 2 (Two) Times a Day.       famotidine 40 MG tablet  Commonly known as: PEPCID      Take 1 tablet by mouth Daily.       lisinopril 40 MG tablet  Commonly known as: PRINIVIL,ZESTRIL      Take 1 tablet by mouth Daily.       meloxicam 15 MG tablet  Commonly known as: MOBIC      Take 1 tablet by mouth Daily As Needed for Moderate Pain.       metoprolol succinate XL 25 MG 24 hr tablet  Commonly known as: TOPROL-XL      Take 0.5 tablets by mouth Daily.       montelukast 10 MG tablet  Commonly known as: SINGULAIR      Take 1 tablet by mouth Every Night.       niacin 500 MG tablet      Take 2 tablets by mouth Every Night.       rosuvastatin 20 MG tablet  Commonly known as: CRESTOR      Take 1 tablet by mouth Every Night.                  Vianey Cooper, RICHA  12/06/24  3:03 PM EST

## 2024-12-12 ENCOUNTER — TELEPHONE (OUTPATIENT)
Dept: CARDIOLOGY | Facility: CLINIC | Age: 84
End: 2024-12-12
Payer: MEDICARE

## 2024-12-13 ENCOUNTER — TELEPHONE (OUTPATIENT)
Dept: CARDIOLOGY | Facility: CLINIC | Age: 84
End: 2024-12-13
Payer: MEDICARE

## 2024-12-13 ENCOUNTER — HOSPITAL ENCOUNTER (OUTPATIENT)
Dept: CARDIOLOGY | Facility: HOSPITAL | Age: 84
Discharge: HOME | End: 2024-12-13
Payer: MEDICARE

## 2024-12-13 VITALS — BODY MASS INDEX: 29.34 KG/M2 | HEIGHT: 67 IN | WEIGHT: 186.95 LBS

## 2024-12-13 DIAGNOSIS — I25.10 CORONARY ARTERY DISEASE INVOLVING NATIVE CORONARY ARTERY OF NATIVE HEART WITHOUT ANGINA PECTORIS: ICD-10-CM

## 2024-12-13 LAB
BH CV NUCLEAR PRIOR STUDY: 2
BH CV REST NUCLEAR ISOTOPE DOSE: 11.5 MCI
BH CV STRESS BP STAGE 1: NORMAL
BH CV STRESS BP STAGE 2: NORMAL
BH CV STRESS BP STAGE 3: NORMAL
BH CV STRESS DURATION MIN STAGE 1: 3
BH CV STRESS DURATION MIN STAGE 2: 3
BH CV STRESS DURATION MIN STAGE 3: 3
BH CV STRESS DURATION SEC STAGE 1: 0
BH CV STRESS DURATION SEC STAGE 2: 0
BH CV STRESS DURATION SEC STAGE 3: 0
BH CV STRESS GRADE STAGE 1: 10
BH CV STRESS GRADE STAGE 2: 12
BH CV STRESS GRADE STAGE 3: 14
BH CV STRESS HR STAGE 1: 83
BH CV STRESS HR STAGE 2: 97
BH CV STRESS HR STAGE 3: 129
BH CV STRESS METS STAGE 1: 5
BH CV STRESS METS STAGE 2: 7.5
BH CV STRESS METS STAGE 3: 10
BH CV STRESS NUCLEAR ISOTOPE DOSE: 33.5 MCI
BH CV STRESS PROTOCOL 1: NORMAL
BH CV STRESS RECOVERY BP: NORMAL MMHG
BH CV STRESS RECOVERY HR: 72 BPM
BH CV STRESS SPEED STAGE 1: 1.7
BH CV STRESS SPEED STAGE 2: 2.5
BH CV STRESS SPEED STAGE 3: 3.4
BH CV STRESS STAGE 1: 1
BH CV STRESS STAGE 2: 2
BH CV STRESS STAGE 3: 3
LV EF NUC BP: 53 %
MAXIMAL PREDICTED HEART RATE: 135 BPM
PERCENT MAX PREDICTED HR: 95.56 %
STRESS BASELINE BP: NORMAL MMHG
STRESS BASELINE HR: 55 BPM
STRESS PERCENT HR: 112 %
STRESS POST ESTIMATED WORKLOAD: 10 METS
STRESS POST EXERCISE DUR MIN: 9 MIN
STRESS POST EXERCISE DUR SEC: 0 SEC
STRESS POST PEAK BP: NORMAL MMHG
STRESS POST PEAK HR: 129 BPM
STRESS TARGET HR: 115 BPM

## 2024-12-13 PROCEDURE — 93017 CV STRESS TEST TRACING ONLY: CPT

## 2024-12-13 PROCEDURE — 34310000005 TECHNETIUM TETROFOSMIN KIT: Performed by: NURSE PRACTITIONER

## 2024-12-13 PROCEDURE — 78452 HT MUSCLE IMAGE SPECT MULT: CPT

## 2024-12-13 PROCEDURE — A9502 TC99M TETROFOSMIN: HCPCS | Performed by: NURSE PRACTITIONER

## 2024-12-13 RX ADMIN — TETROFOSMIN 1 DOSE: 1.38 INJECTION, POWDER, LYOPHILIZED, FOR SOLUTION INTRAVENOUS at 12:26

## 2024-12-13 RX ADMIN — TETROFOSMIN 1 DOSE: 1.38 INJECTION, POWDER, LYOPHILIZED, FOR SOLUTION INTRAVENOUS at 11:30

## 2024-12-13 NOTE — TELEPHONE ENCOUNTER
Called and left VM, will continue to try to reach pt.    HUB- please put patient straight through to triage    Priya Roman, RN  Triage RN  12/13/24 15:50 EST

## 2024-12-13 NOTE — TELEPHONE ENCOUNTER
----- Message from Vianey Cooper sent at 12/13/2024  3:49 PM EST -----  Please let patient know stress test was normal.

## 2024-12-13 NOTE — TELEPHONE ENCOUNTER
Pt called back in to triage.  Results reviewed with pt.  Instructed to call with any further questions or concerns.  Verbalized understanding.    Priya Roman RN  Triage Nurse, [facility]  12/13/24 15:55 EST

## 2025-05-28 DIAGNOSIS — I10 ESSENTIAL HYPERTENSION: ICD-10-CM

## 2025-05-29 RX ORDER — LISINOPRIL 40 MG/1
40 TABLET ORAL DAILY
Qty: 90 TABLET | Refills: 1 | Status: SHIPPED | OUTPATIENT
Start: 2025-05-29

## 2025-06-05 ENCOUNTER — TELEPHONE (OUTPATIENT)
Dept: CARDIOLOGY | Age: 85
End: 2025-06-05
Payer: MEDICARE

## 2025-06-05 ENCOUNTER — HOSPITAL ENCOUNTER (OUTPATIENT)
Dept: CARDIOLOGY | Facility: HOSPITAL | Age: 85
Discharge: HOME | End: 2025-06-05
Admitting: INTERNAL MEDICINE
Payer: MEDICARE

## 2025-06-05 ENCOUNTER — OFFICE VISIT (OUTPATIENT)
Dept: CARDIOLOGY | Age: 85
End: 2025-06-05
Payer: MEDICARE

## 2025-06-05 VITALS
OXYGEN SATURATION: 98 % | DIASTOLIC BLOOD PRESSURE: 82 MMHG | SYSTOLIC BLOOD PRESSURE: 160 MMHG | WEIGHT: 187.1 LBS | HEART RATE: 54 BPM | BODY MASS INDEX: 29.37 KG/M2 | HEIGHT: 67 IN

## 2025-06-05 DIAGNOSIS — I20.0 UNSTABLE ANGINA: Primary | ICD-10-CM

## 2025-06-05 DIAGNOSIS — I25.110 CORONARY ARTERY DISEASE INVOLVING NATIVE CORONARY ARTERY OF NATIVE HEART WITH UNSTABLE ANGINA PECTORIS: ICD-10-CM

## 2025-06-05 DIAGNOSIS — I20.0 UNSTABLE ANGINA: ICD-10-CM

## 2025-06-05 DIAGNOSIS — E78.2 MIXED HYPERLIPIDEMIA: ICD-10-CM

## 2025-06-05 DIAGNOSIS — I10 ESSENTIAL HYPERTENSION: ICD-10-CM

## 2025-06-05 LAB
ANION GAP SERPL CALCULATED.3IONS-SCNC: 10.3 MMOL/L (ref 5–15)
BASOPHILS # BLD AUTO: 0.01 10*3/MM3 (ref 0–0.2)
BASOPHILS NFR BLD AUTO: 0.2 % (ref 0–1.5)
BUN SERPL-MCNC: 17 MG/DL (ref 8–23)
BUN/CREAT SERPL: 18.7 (ref 7–25)
CALCIUM SPEC-SCNC: 9.2 MG/DL (ref 8.6–10.5)
CHLORIDE SERPL-SCNC: 110 MMOL/L (ref 98–107)
CO2 SERPL-SCNC: 21.7 MMOL/L (ref 22–29)
CREAT SERPL-MCNC: 0.91 MG/DL (ref 0.76–1.27)
DEPRECATED RDW RBC AUTO: 42.5 FL (ref 37–54)
EGFRCR SERPLBLD CKD-EPI 2021: 82.6 ML/MIN/1.73
EOSINOPHIL # BLD AUTO: 0.19 10*3/MM3 (ref 0–0.4)
EOSINOPHIL NFR BLD AUTO: 3.5 % (ref 0.3–6.2)
ERYTHROCYTE [DISTWIDTH] IN BLOOD BY AUTOMATED COUNT: 12.3 % (ref 12.3–15.4)
GLUCOSE SERPL-MCNC: 123 MG/DL (ref 65–99)
HCT VFR BLD AUTO: 44 % (ref 37.5–51)
HGB BLD-MCNC: 14.8 G/DL (ref 13–17.7)
IMM GRANULOCYTES # BLD AUTO: 0.03 10*3/MM3 (ref 0–0.05)
IMM GRANULOCYTES NFR BLD AUTO: 0.5 % (ref 0–0.5)
LYMPHOCYTES # BLD AUTO: 1.58 10*3/MM3 (ref 0.7–3.1)
LYMPHOCYTES NFR BLD AUTO: 28.8 % (ref 19.6–45.3)
MCH RBC QN AUTO: 31.8 PG (ref 26.6–33)
MCHC RBC AUTO-ENTMCNC: 33.6 G/DL (ref 31.5–35.7)
MCV RBC AUTO: 94.6 FL (ref 79–97)
MONOCYTES # BLD AUTO: 1.22 10*3/MM3 (ref 0.1–0.9)
MONOCYTES NFR BLD AUTO: 22.2 % (ref 5–12)
NEUTROPHILS NFR BLD AUTO: 2.46 10*3/MM3 (ref 1.7–7)
NEUTROPHILS NFR BLD AUTO: 44.8 % (ref 42.7–76)
PLATELET # BLD AUTO: 149 10*3/MM3 (ref 140–450)
PMV BLD AUTO: 9.4 FL (ref 6–12)
POTASSIUM SERPL-SCNC: 4.1 MMOL/L (ref 3.5–5.2)
RBC # BLD AUTO: 4.65 10*6/MM3 (ref 4.14–5.8)
SODIUM SERPL-SCNC: 142 MMOL/L (ref 136–145)
WBC NRBC COR # BLD AUTO: 5.49 10*3/MM3 (ref 3.4–10.8)

## 2025-06-05 PROCEDURE — 85025 COMPLETE CBC W/AUTO DIFF WBC: CPT | Performed by: INTERNAL MEDICINE

## 2025-06-05 PROCEDURE — 80061 LIPID PANEL: CPT | Performed by: FAMILY MEDICINE

## 2025-06-05 PROCEDURE — 80048 BASIC METABOLIC PNL TOTAL CA: CPT | Performed by: INTERNAL MEDICINE

## 2025-06-05 PROCEDURE — 36415 COLL VENOUS BLD VENIPUNCTURE: CPT

## 2025-06-05 RX ORDER — DORZOLAMIDE HYDROCHLORIDE AND TIMOLOL MALEATE PRESERVATIVE FREE 20; 5 MG/ML; MG/ML
1 SOLUTION/ DROPS OPHTHALMIC 2 TIMES DAILY
COMMUNITY
Start: 2025-04-07

## 2025-06-05 NOTE — PROGRESS NOTES
[facility]      Patient Name: Mukesh Beard  :1940  Age: 84 y.o.  Encounter Provider:  Emir Proctor Jr, MD      Chief Complaint:   Chief Complaint   Patient presents with    Follow-up     6 month         HPI  Mukesh Beard is a 84 y.o. male with known coronary artery disease status post CABG , hypertension and dyslipidemia as well as carotid stenosis presents for follow-up evaluation.     Last clinic visit note: Previously followed by Dr. Elian Rizo and all pertinent electronic health records have been reviewed.  Patient has good functional capacity has been walking on the treadmill for about 1 to 1-1/2 miles daily at 3.3 mph with no chest pain or shortness of air.  Blood pressure is quite elevated today in clinic at 160/82 mmHg.  Patient has known carotid artery disease and had an ultrasound Doppler in 2021 which showed mild bilateral plaque with question of Doppler evidence showing steal on the left.  He denies lightheaded sensations or dizziness/syncope.  He is a former smoker quit in .  LDL is 47 on last check.  Social and family history was reviewed and is not pertinent to this clinic visit.    Because of exertional chest discomfort patient had a stress study after last visit with RICHA Cooper.  No evidence of ischemia.  He continues to have exertional chest discomfort.  No orthopnea, PND or edema.  No palpitations, dizziness or syncope.  No cardiac complaints at time of interview.    The following portions of the patient's history were reviewed and updated as appropriate: allergies, current medications, past family history, past medical history, past social history, past surgical history and problem list.      Review of Systems   Constitutional: Negative for chills and fever.   HENT:  Negative for hoarse voice and sore throat.    Eyes:  Negative for double vision and photophobia.   Cardiovascular:  Negative for chest pain, leg swelling,  "near-syncope, orthopnea, palpitations, paroxysmal nocturnal dyspnea and syncope.   Respiratory:  Negative for cough and wheezing.    Skin:  Negative for poor wound healing and rash.   Musculoskeletal:  Negative for arthritis and joint swelling.   Gastrointestinal:  Negative for bloating, abdominal pain, hematemesis and hematochezia.   Neurological:  Negative for dizziness and focal weakness.   Psychiatric/Behavioral:  Negative for depression and suicidal ideas.        OBJECTIVE:   Vital Signs  Vitals:    06/05/25 1114   BP: 160/82   Pulse: 54   SpO2: 98%     Estimated body mass index is 29.37 kg/m² as calculated from the following:    Height as of this encounter: 170 cm (66.93\").    Weight as of this encounter: 84.9 kg (187 lb 1.6 oz).    Vitals reviewed.   Constitutional:       Appearance: Healthy appearance. Not in distress.   Neck:      Vascular: No JVR. JVD normal.   Pulmonary:      Effort: Pulmonary effort is normal.      Breath sounds: Normal breath sounds. No wheezing. No rhonchi. No rales.   Chest:      Chest wall: Not tender to palpatation.   Cardiovascular:      PMI at left midclavicular line. Normal rate. Regular rhythm. Normal S1. Normal S2.       Murmurs: There is no murmur.      No gallop.  No click. No rub.   Pulses:     Intact distal pulses.   Edema:     Peripheral edema absent.   Abdominal:      General: Bowel sounds are normal.      Palpations: Abdomen is soft.      Tenderness: There is no abdominal tenderness.   Musculoskeletal: Normal range of motion.         General: No tenderness. Skin:     General: Skin is warm and dry.   Neurological:      General: No focal deficit present.      Mental Status: Alert and oriented to person, place and time.         Procedures          ASSESSMENT:     84-year-old male with known coronary artery disease status post CABG with no angina    PLAN OF CARE:     Coronary artery disease -status post CABG functional capacity limited by what sounds to be unstable angina.  " Plan for left heart catheterization..  LDL is at goal.  Continue same.  Carotid stenosis -mild bilateral plaque.  Questionable evidence for subclavian steal.  Patient has no symptoms at this time.  We will monitor closely.  Hypertension -blood pressure is elevated today in clinic.  Twice daily blood pressure log to be sent into the clinic after 1 week.  Sodium restricted diet is counseled.  Dyslipidemia -LDL is at goal     Return to clinic 6 months             Discharge Medications            Accurate as of June 5, 2025 11:31 AM. If you have any questions, ask your nurse or doctor.                Changes to Medications        Instructions Start Date   dorzolamide-timolol 2-0.5 % ophthalmic solution  Commonly known as: COSOPT PF  What changed: Another medication with the same name was removed. Continue taking this medication, and follow the directions you see here.  Changed by: Emir Proctor   1 drop, 2 Times Daily      Vitamin D3 50 MCG (2000 UT) capsule  What changed: how much to take   2,000 Units, Oral, Nightly             Continue These Medications        Instructions Start Date   amLODIPine 5 MG tablet  Commonly known as: NORVASC   5 mg, Oral, Daily      aspirin 81 MG tablet   81 mg, Oral, Nightly, Resume 4/14/18      carBAMazepine  MG 12 hr tablet  Commonly known as: TEGretol  XR   100 mg, Oral, 2 Times Daily      cetirizine 10 MG tablet  Commonly known as: zyrTEC   10 mg, Oral, Daily      famotidine 40 MG tablet  Commonly known as: PEPCID   40 mg, Oral, Daily      lisinopril 40 MG tablet  Commonly known as: PRINIVIL,ZESTRIL   40 mg, Oral, Daily      meloxicam 15 MG tablet  Commonly known as: MOBIC   15 mg, Oral, Daily PRN      metoprolol succinate XL 25 MG 24 hr tablet  Commonly known as: TOPROL-XL   12.5 mg, Oral, Daily      montelukast 10 MG tablet  Commonly known as: SINGULAIR   10 mg, Oral, Nightly      niacin 500 MG tablet   1,000 mg, Oral, Nightly      rosuvastatin 20 MG tablet  Commonly known as:  CRESTOR   20 mg, Oral, Nightly               Thank you for allowing me to participate in the care of your patient,      Sincerely,   Emir Proctor MD  [facility]  06/05/25  11:31 EDT

## 2025-06-05 NOTE — H&P (VIEW-ONLY)
[facility]      Patient Name: Mukesh Beard  :1940  Age: 84 y.o.  Encounter Provider:  Emir Proctor Jr, MD      Chief Complaint:   Chief Complaint   Patient presents with    Follow-up     6 month         HPI  Mukesh Beard is a 84 y.o. male with known coronary artery disease status post CABG , hypertension and dyslipidemia as well as carotid stenosis presents for follow-up evaluation.     Last clinic visit note: Previously followed by Dr. Elian Rizo and all pertinent electronic health records have been reviewed.  Patient has good functional capacity has been walking on the treadmill for about 1 to 1-1/2 miles daily at 3.3 mph with no chest pain or shortness of air.  Blood pressure is quite elevated today in clinic at 160/82 mmHg.  Patient has known carotid artery disease and had an ultrasound Doppler in 2021 which showed mild bilateral plaque with question of Doppler evidence showing steal on the left.  He denies lightheaded sensations or dizziness/syncope.  He is a former smoker quit in .  LDL is 47 on last check.  Social and family history was reviewed and is not pertinent to this clinic visit.    Because of exertional chest discomfort patient had a stress study after last visit with RICHA Cooper.  No evidence of ischemia.  He continues to have exertional chest discomfort.  No orthopnea, PND or edema.  No palpitations, dizziness or syncope.  No cardiac complaints at time of interview.    The following portions of the patient's history were reviewed and updated as appropriate: allergies, current medications, past family history, past medical history, past social history, past surgical history and problem list.      Review of Systems   Constitutional: Negative for chills and fever.   HENT:  Negative for hoarse voice and sore throat.    Eyes:  Negative for double vision and photophobia.   Cardiovascular:  Negative for chest pain, leg swelling,  "near-syncope, orthopnea, palpitations, paroxysmal nocturnal dyspnea and syncope.   Respiratory:  Negative for cough and wheezing.    Skin:  Negative for poor wound healing and rash.   Musculoskeletal:  Negative for arthritis and joint swelling.   Gastrointestinal:  Negative for bloating, abdominal pain, hematemesis and hematochezia.   Neurological:  Negative for dizziness and focal weakness.   Psychiatric/Behavioral:  Negative for depression and suicidal ideas.        OBJECTIVE:   Vital Signs  Vitals:    06/05/25 1114   BP: 160/82   Pulse: 54   SpO2: 98%     Estimated body mass index is 29.37 kg/m² as calculated from the following:    Height as of this encounter: 170 cm (66.93\").    Weight as of this encounter: 84.9 kg (187 lb 1.6 oz).    Vitals reviewed.   Constitutional:       Appearance: Healthy appearance. Not in distress.   Neck:      Vascular: No JVR. JVD normal.   Pulmonary:      Effort: Pulmonary effort is normal.      Breath sounds: Normal breath sounds. No wheezing. No rhonchi. No rales.   Chest:      Chest wall: Not tender to palpatation.   Cardiovascular:      PMI at left midclavicular line. Normal rate. Regular rhythm. Normal S1. Normal S2.       Murmurs: There is no murmur.      No gallop.  No click. No rub.   Pulses:     Intact distal pulses.   Edema:     Peripheral edema absent.   Abdominal:      General: Bowel sounds are normal.      Palpations: Abdomen is soft.      Tenderness: There is no abdominal tenderness.   Musculoskeletal: Normal range of motion.         General: No tenderness. Skin:     General: Skin is warm and dry.   Neurological:      General: No focal deficit present.      Mental Status: Alert and oriented to person, place and time.         Procedures          ASSESSMENT:     84-year-old male with known coronary artery disease status post CABG with no angina    PLAN OF CARE:     Coronary artery disease -status post CABG functional capacity limited by what sounds to be unstable angina.  " Plan for left heart catheterization..  LDL is at goal.  Continue same.  Carotid stenosis -mild bilateral plaque.  Questionable evidence for subclavian steal.  Patient has no symptoms at this time.  We will monitor closely.  Hypertension -blood pressure is elevated today in clinic.  Twice daily blood pressure log to be sent into the clinic after 1 week.  Sodium restricted diet is counseled.  Dyslipidemia -LDL is at goal     Return to clinic 6 months             Discharge Medications            Accurate as of June 5, 2025 11:31 AM. If you have any questions, ask your nurse or doctor.                Changes to Medications        Instructions Start Date   dorzolamide-timolol 2-0.5 % ophthalmic solution  Commonly known as: COSOPT PF  What changed: Another medication with the same name was removed. Continue taking this medication, and follow the directions you see here.  Changed by: Emir Proctor   1 drop, 2 Times Daily      Vitamin D3 50 MCG (2000 UT) capsule  What changed: how much to take   2,000 Units, Oral, Nightly             Continue These Medications        Instructions Start Date   amLODIPine 5 MG tablet  Commonly known as: NORVASC   5 mg, Oral, Daily      aspirin 81 MG tablet   81 mg, Oral, Nightly, Resume 4/14/18      carBAMazepine  MG 12 hr tablet  Commonly known as: TEGretol  XR   100 mg, Oral, 2 Times Daily      cetirizine 10 MG tablet  Commonly known as: zyrTEC   10 mg, Oral, Daily      famotidine 40 MG tablet  Commonly known as: PEPCID   40 mg, Oral, Daily      lisinopril 40 MG tablet  Commonly known as: PRINIVIL,ZESTRIL   40 mg, Oral, Daily      meloxicam 15 MG tablet  Commonly known as: MOBIC   15 mg, Oral, Daily PRN      metoprolol succinate XL 25 MG 24 hr tablet  Commonly known as: TOPROL-XL   12.5 mg, Oral, Daily      montelukast 10 MG tablet  Commonly known as: SINGULAIR   10 mg, Oral, Nightly      niacin 500 MG tablet   1,000 mg, Oral, Nightly      rosuvastatin 20 MG tablet  Commonly known as:  CRESTOR   20 mg, Oral, Nightly               Thank you for allowing me to participate in the care of your patient,      Sincerely,   Emir Proctor MD  [facility]  06/05/25  11:31 EDT

## 2025-06-05 NOTE — TELEPHONE ENCOUNTER
Procedure Confirmed With Patient On: 6.5.2025    Date Scheduled & Patient Was Given Instructions: 6.5.2025                  Via: OFFICE    Labs Completed On: 6.5.2025    Patient Verbalized Understanding Of Arrival Time (8 am) Where To Park, Instructions For Procedure, and Medications To Hold: Yes

## 2025-06-06 ENCOUNTER — OFFICE VISIT (OUTPATIENT)
Dept: FAMILY MEDICINE CLINIC | Age: 85
End: 2025-06-06
Payer: MEDICARE

## 2025-06-06 VITALS
OXYGEN SATURATION: 97 % | HEIGHT: 67 IN | WEIGHT: 186.4 LBS | BODY MASS INDEX: 29.26 KG/M2 | HEART RATE: 50 BPM | DIASTOLIC BLOOD PRESSURE: 80 MMHG | TEMPERATURE: 98.1 F | SYSTOLIC BLOOD PRESSURE: 179 MMHG

## 2025-06-06 DIAGNOSIS — G50.0 TRIGEMINAL NEURALGIA OF LEFT SIDE OF FACE: ICD-10-CM

## 2025-06-06 DIAGNOSIS — K21.9 GASTROESOPHAGEAL REFLUX DISEASE WITHOUT ESOPHAGITIS: ICD-10-CM

## 2025-06-06 DIAGNOSIS — M54.50 CHRONIC BILATERAL LOW BACK PAIN WITHOUT SCIATICA: ICD-10-CM

## 2025-06-06 DIAGNOSIS — I25.110 CORONARY ARTERY DISEASE INVOLVING NATIVE CORONARY ARTERY OF NATIVE HEART WITH UNSTABLE ANGINA PECTORIS: ICD-10-CM

## 2025-06-06 DIAGNOSIS — Z00.00 ANNUAL PHYSICAL EXAM: Primary | ICD-10-CM

## 2025-06-06 DIAGNOSIS — G89.29 CHRONIC BILATERAL LOW BACK PAIN WITHOUT SCIATICA: ICD-10-CM

## 2025-06-06 DIAGNOSIS — E78.2 MIXED HYPERLIPIDEMIA: ICD-10-CM

## 2025-06-06 DIAGNOSIS — I10 ESSENTIAL HYPERTENSION: ICD-10-CM

## 2025-06-06 DIAGNOSIS — J30.9 CHRONIC ALLERGIC RHINITIS: ICD-10-CM

## 2025-06-06 LAB
CHOLEST SERPL-MCNC: 107 MG/DL (ref 0–200)
HDLC SERPL-MCNC: 41 MG/DL (ref 40–60)
LDLC SERPL CALC-MCNC: 47 MG/DL (ref 0–100)
LDLC/HDLC SERPL: 1.14 {RATIO}
TRIGL SERPL-MCNC: 97 MG/DL (ref 0–150)
VLDLC SERPL-MCNC: 19 MG/DL (ref 5–40)

## 2025-06-06 RX ORDER — FAMOTIDINE 40 MG/1
40 TABLET, FILM COATED ORAL DAILY
Qty: 90 TABLET | Refills: 1 | Status: SHIPPED | OUTPATIENT
Start: 2025-06-06

## 2025-06-06 RX ORDER — METOPROLOL SUCCINATE 25 MG/1
12.5 TABLET, EXTENDED RELEASE ORAL DAILY
Qty: 45 TABLET | Refills: 1 | Status: SHIPPED | OUTPATIENT
Start: 2025-06-06

## 2025-06-06 RX ORDER — MELOXICAM 15 MG/1
15 TABLET ORAL DAILY PRN
Qty: 90 TABLET | Refills: 1 | Status: SHIPPED | OUTPATIENT
Start: 2025-06-06

## 2025-06-06 RX ORDER — ROSUVASTATIN CALCIUM 20 MG/1
20 TABLET, COATED ORAL NIGHTLY
Qty: 90 TABLET | Refills: 1 | Status: SHIPPED | OUTPATIENT
Start: 2025-06-06

## 2025-06-06 RX ORDER — CARBAMAZEPINE 100 MG/1
100 TABLET, EXTENDED RELEASE ORAL 2 TIMES DAILY
Qty: 180 TABLET | Refills: 1 | Status: SHIPPED | OUTPATIENT
Start: 2025-06-06

## 2025-06-06 RX ORDER — MONTELUKAST SODIUM 10 MG/1
10 TABLET ORAL NIGHTLY
Qty: 90 TABLET | Refills: 3 | Status: SHIPPED | OUTPATIENT
Start: 2025-06-06

## 2025-06-06 RX ORDER — AMLODIPINE BESYLATE 5 MG/1
5 TABLET ORAL DAILY
Qty: 90 TABLET | Refills: 1 | Status: SHIPPED | OUTPATIENT
Start: 2025-06-06

## 2025-06-06 NOTE — ASSESSMENT & PLAN NOTE
Stable on meloxicam 15 mg daily as needed.  Refills were needed today.  Labs done yesterday by Cardiology were reviewed.  Continue to monitor.  Orders:    meloxicam (MOBIC) 15 MG tablet; Take 1 tablet by mouth Daily As Needed for Moderate Pain.

## 2025-06-06 NOTE — ASSESSMENT & PLAN NOTE
Stable on carbamazepine 100 mg twice daily.  Refills were needed today.  Continue to monitor.  Orders:    carBAMazepine XR (TEGretol  XR) 100 MG 12 hr tablet; Take 1 tablet by mouth 2 (Two) Times a Day.

## 2025-06-06 NOTE — PROGRESS NOTES
Subjective   The ABCs of the Annual Wellness Visit  Medicare Wellness Visit      Mukesh Beard is a 84 y.o. patient who presents for a Medicare Wellness Visit.    The following portions of the patient's history were reviewed and   updated as appropriate: allergies, current medications, past family history, past medical history, past social history, past surgical history, and problem list.    Compared to one year ago, the patient's physical   health is the same.  Compared to one year ago, the patient's mental   health is the same.    Recent Hospitalizations:  He was not admitted to the hospital during the last year.     Current Medical Providers:  Patient Care Team:  Alexandra Barnes MD as PCP - General (Family Medicine)  Shanta Braun MD as Consulting Physician (Urology)  Emir Proctor Jr., MD as Consulting Physician (Cardiology)    Outpatient Medications Prior to Visit   Medication Sig Dispense Refill   • aspirin 81 MG tablet Take 1 tablet by mouth Every Night. Resume 4/14/18     • cetirizine (zyrTEC) 10 MG tablet Take 1 tablet by mouth Daily. 90 tablet 3   • Cholecalciferol (Vitamin D3) 50 MCG (2000 UT) capsule Take 1 capsule by mouth Every Night. (Patient taking differently: Take 1,000 capsules by mouth Every Night.) 90 capsule 3   • dorzolamide-timolol (COSOPT PF) 2-0.5 % ophthalmic solution Administer 1 drop to both eyes 2 (Two) Times a Day.     • lisinopril (PRINIVIL,ZESTRIL) 40 MG tablet TAKE 1 TABLET DAILY 90 tablet 1   • niacin 500 MG tablet Take 2 tablets by mouth Every Night. 180 tablet 3   • amLODIPine (NORVASC) 5 MG tablet Take 1 tablet by mouth Daily. 90 tablet 1   • carBAMazepine XR (TEGretol  XR) 100 MG 12 hr tablet Take 1 tablet by mouth 2 (Two) Times a Day. 180 tablet 1   • famotidine (PEPCID) 40 MG tablet Take 1 tablet by mouth Daily. 90 tablet 1   • meloxicam (MOBIC) 15 MG tablet Take 1 tablet by mouth Daily As Needed for Moderate Pain. 90 tablet 1   • metoprolol succinate XL  "(TOPROL-XL) 25 MG 24 hr tablet Take 0.5 tablets by mouth Daily. 45 tablet 1   • montelukast (SINGULAIR) 10 MG tablet Take 1 tablet by mouth Every Night. 90 tablet 3   • rosuvastatin (CRESTOR) 20 MG tablet Take 1 tablet by mouth Every Night. 90 tablet 1     No facility-administered medications prior to visit.     No opioid medication identified on active medication list. I have reviewed chart for other potential  high risk medication/s and harmful drug interactions in the elderly.      Aspirin is on active medication list. Aspirin use is indicated based on review of current medical condition/s. Pros and cons of this therapy have been discussed today. Benefits of this medication outweigh potential harm.  Patient has been encouraged to continue taking this medication.  .      Patient Active Problem List   Diagnosis   • Essential hypertension   • Coronary artery disease involving native coronary artery of native heart with unstable angina pectoris   • Bilateral primary osteoarthritis of knee   • Mixed hyperlipidemia   • Trigeminal neuralgia of left side of face   • Gastroesophageal reflux disease without esophagitis   • Chronic bilateral low back pain without sciatica   • Glaucoma   • Chronic allergic rhinitis   • Carotid stenosis, asymptomatic, left   • Unstable angina     Advance Care Planning Advance Directive is not on file.  ACP discussion was held with the patient during this visit. Patient does not have an advance directive, information provided.            Objective   Vitals:    06/06/25 1254   BP: 169/76   BP Location: Left arm   Patient Position: Sitting   Pulse: 50   Temp: 98.1 °F (36.7 °C)   TempSrc: Oral   SpO2: 97%   Weight: 84.6 kg (186 lb 6.4 oz)   Height: 170 cm (66.93\")   PainSc: 0-No pain       Estimated body mass index is 29.26 kg/m² as calculated from the following:    Height as of this encounter: 170 cm (66.93\").    Weight as of this encounter: 84.6 kg (186 lb 6.4 oz).    BMI is >= 25 and <30. " (Overweight) The following options were offered after discussion;: exercise counseling/recommendations and nutrition counseling/recommendations           Does the patient have evidence of cognitive impairment? No                                                                                                Health  Risk Assessment    Smoking Status:  Social History     Tobacco Use   Smoking Status Former   • Current packs/day: 0.00   • Average packs/day: 1 pack/day for 10.0 years (10.0 ttl pk-yrs)   • Types: Cigarettes   • Start date: 1962   • Quit date: 1972   • Years since quittin.4   • Passive exposure: Past   Smokeless Tobacco Never   Tobacco Comments    caffeine use-coffee     Alcohol Consumption:  Social History     Substance and Sexual Activity   Alcohol Use Not Currently    Comment: RARE       Fall Risk Screen  STEADI Fall Risk Assessment was completed, and patient is at LOW risk for falls.Assessment completed on:2025    Depression Screening   Little interest or pleasure in doing things? Not at all   Feeling down, depressed, or hopeless? Nearly every day   PHQ-2 Total Score 3   Trouble falling or staying asleep, or sleeping too much? Not at all   Feeling tired or having little energy? Several days   Poor appetite or overeating? Not at all   Feeling bad about yourself - or that you are a failure or have let yourself or your family down? Not at all   Trouble concentrating on things, such as reading the newspaper or watching television? Not at all   Moving or speaking so slowly that other people could have noticed? Or the opposite - being so fidgety or restless that you have been moving around a lot more than usual? Not at all     Thoughts that you would be better off dead, or of hurting yourself in some way? Not at all   PHQ-9 Total Score 4   If you checked off any problems, how difficult have these problems made it for you to do your work, take care of things at home, or get along with other  people? Not difficult at all        Health Habits and Functional and Cognitive Screenin/6/2025    12:55 PM   Functional & Cognitive Status   Do you have difficulty preparing food and eating? No   Do you have difficulty bathing yourself, getting dressed or grooming yourself? No   Do you have difficulty using the toilet? No   Do you have difficulty moving around from place to place? No   Do you have trouble with steps or getting out of a bed or a chair? No   Current Diet Well Balanced Diet   Dental Exam Up to date   Eye Exam Up to date   Exercise (times per week) 6 times per week   Current Exercises Include Treadmill   Do you need help using the phone?  No   Are you deaf or do you have serious difficulty hearing?  No   Do you need help to go to places out of walking distance? No   Do you need help shopping? No   Do you need help preparing meals?  No   Do you need help with housework?  No   Do you need help with laundry? No   Do you need help taking your medications? No   Do you need help managing money? No   Do you ever drive or ride in a car without wearing a seat belt? No   Have you felt unusual stress, anger or loneliness in the last month? Yes   Who do you live with? Spouse   If you need help, do you have trouble finding someone available to you? No   Have you been bothered in the last four weeks by sexual problems? No   Do you have difficulty concentrating, remembering or making decisions? No           Age-appropriate Screening Schedule:  Refer to the list below for future screening recommendations based on patient's age, sex and/or medical conditions. Orders for these recommended tests are listed in the plan section. The patient has been provided with a written plan.    Health Maintenance List  Health Maintenance   Topic Date Due   • LIPID PANEL  2025   • COVID-19 Vaccine ( season) 2025 (Originally 2025)   • INFLUENZA VACCINE  2025   • ANNUAL WELLNESS VISIT  2026    • TDAP/TD VACCINES (3 - Td or Tdap) 01/30/2035   • RSV Vaccine - Adults  Completed   • Pneumococcal Vaccine 50+  Completed   • ZOSTER VACCINE  Completed                                                                                                                                                CMS Preventative Services Quick Reference  Risk Factors Identified During Encounter  Dental Screening Recommended  Vision Screening Recommended    The above risks/problems have been discussed with the patient.  Pertinent information has been shared with the patient in the After Visit Summary.  An After Visit Summary and PPPS were made available to the patient.    Follow Up:   Next Medicare Wellness visit to be scheduled in 1 year.         Additional E&M Note during same encounter follows:  Patient has additional, significant, and separately identifiable condition(s)/problem(s) that require work above and beyond the Medicare Wellness Visit     Chief Complaint  Medicare Wellness-subsequent    Subjective   HPI  Kiran is also being seen today for additional medical problem/s.    He lost his daughter to a brain tumor a couple of weeks ago.  He also has lost several other close family members and friends in a very short time, since Feb.  He does have good family support    He is on amlodipine, lisinopril, and metoprolol succinate for HTN.  He follows with Cardiology Dr. Proctor. He is on rosuvastatin for HLD and CAD.  S/p 5 v. CABG (>20 years ago).  He was seen by Dr. Proctor just yesterday at which time Dr. Proctor diagnosed him with unstable angina.  They are planning to go for left heart cath, scheduled for 6/12/2025 with Dr. Benitez.  Blood pressure has been elevated and he is keeping a log of twice daily pressures which he is to return to Cardiology in 1 week.  Blood pressure is likewise high today in clinic.    He is on carbamazepine for trigeminal neuralgia, L face.       He is on famotidine for GERD.     He is on meloxicam  "for chronic shoulder and back pain for diffuse OA.     He follows with Roseann Floresshaniqua for glaucoma.    He is on montelukast and cetirizine for chronic allergies.    Review of Systems   Constitutional:  Negative for chills, fatigue and fever.   HENT:  Negative for congestion, hearing loss and rhinorrhea.    Eyes:  Positive for visual disturbance. Negative for pain.   Respiratory:  Negative for cough and shortness of breath.    Cardiovascular:  Positive for chest pain. Negative for palpitations.   Gastrointestinal:  Negative for abdominal pain, constipation, diarrhea, nausea and vomiting.   Genitourinary:  Negative for difficulty urinating and dysuria.   Musculoskeletal:  Positive for arthralgias. Negative for myalgias.   Neurological:  Negative for weakness and numbness.   Psychiatric/Behavioral:  Negative for dysphoric mood and sleep disturbance. The patient is not nervous/anxious.               Objective   Vital Signs:  /76 (BP Location: Left arm, Patient Position: Sitting)   Pulse 50   Temp 98.1 °F (36.7 °C) (Oral)   Ht 170 cm (66.93\")   Wt 84.6 kg (186 lb 6.4 oz)   SpO2 97%   BMI 29.26 kg/m²   Physical Exam  Vitals reviewed.   Constitutional:       General: He is not in acute distress.     Appearance: Normal appearance. He is well-developed.   HENT:      Head: Normocephalic and atraumatic.      Right Ear: External ear normal.      Left Ear: External ear normal.      Mouth/Throat:      Mouth: Mucous membranes are moist.   Eyes:      Extraocular Movements: Extraocular movements intact.      Conjunctiva/sclera: Conjunctivae normal.      Pupils: Pupils are equal, round, and reactive to light.   Cardiovascular:      Rate and Rhythm: Normal rate and regular rhythm.      Heart sounds: No murmur heard.  Pulmonary:      Effort: Pulmonary effort is normal.      Breath sounds: Normal breath sounds. No wheezing, rhonchi or rales.   Abdominal:      General: Bowel sounds are normal. There is no " distension.      Palpations: Abdomen is soft.      Tenderness: There is no abdominal tenderness.   Musculoskeletal:         General: Normal range of motion.   Skin:     General: Skin is warm and dry.   Neurological:      Mental Status: He is alert and oriented to person, place, and time.      Deep Tendon Reflexes: Reflexes normal.   Psychiatric:         Mood and Affect: Mood and affect normal.         Behavior: Behavior normal.         Thought Content: Thought content normal.         Judgment: Judgment normal.     Lab Results   Component Value Date    GLUCOSE 123 (H) 06/05/2025    BUN 17.0 06/05/2025    CREATININE 0.91 06/05/2025    EGFR 82.6 06/05/2025    BCR 18.7 06/05/2025    K 4.1 06/05/2025    CO2 21.7 (L) 06/05/2025    CALCIUM 9.2 06/05/2025    ALBUMIN 4.5 05/03/2024    BILITOT 0.4 05/03/2024    AST 20 05/03/2024    ALT 22 05/03/2024       Lab Results   Component Value Date    CHOL 116 05/03/2024    CHLPL 108 04/23/2021    TRIG 162 (H) 05/03/2024    HDL 37 (L) 05/03/2024    LDL 52 05/03/2024       Lab Results   Component Value Date    WBC 5.49 06/05/2025    HGB 14.8 06/05/2025    HCT 44.0 06/05/2025    MCV 94.6 06/05/2025     06/05/2025                  Assessment and Plan      Annual physical exam  Colonoscopy is no longer indicated by age and history.  Prostate cancer screening is no longer indicated by age.  He is UTD on COVID (11/2024), Prevnar 20 (5/2024), Pneumovax (6/2015), Yejrdti68 (11/2014), Zostavax (8/2010), Shingrix (4/6/2023, 9/2023), RSV (1/2025), Tdap (1/2025), and flu (11/2024).   He is due for routine labs including lipid panel; ordered today (to be added to labs drawn yesterday by Cardiology).          Essential hypertension    Blood pressure has been running at goal.  Continue amlodipine 5 mg daily and metoprolol succinate 12.5 mg daily.  Refills were needed today.  Labs were needed today.  Continue to monitor.  Orders:  •  Lipid Panel; Future  •  amLODIPine (NORVASC) 5 MG tablet;  Take 1 tablet by mouth Daily.  •  metoprolol succinate XL (TOPROL-XL) 25 MG 24 hr tablet; Take 0.5 tablets by mouth Daily.    Mixed hyperlipidemia     Stable on rosuvastatin 20 mg daily.  Refills were needed today.  Labs were due today.  Continue to monitor.  Orders:  •  Lipid Panel; Future  •  rosuvastatin (CRESTOR) 20 MG tablet; Take 1 tablet by mouth Every Night.    Coronary artery disease involving native coronary artery of native heart with unstable angina pectoris    Increasing chest pain concerning for unstable angina.  He saw his cardiologist Dr. Proctor yesterday who is scheduling him for left heart cath to be done 6/12.  Orders:  •  Lipid Panel; Future    Trigeminal neuralgia of left side of face  Stable on carbamazepine 100 mg twice daily.  Refills were needed today.  Continue to monitor.  Orders:  •  carBAMazepine XR (TEGretol  XR) 100 MG 12 hr tablet; Take 1 tablet by mouth 2 (Two) Times a Day.    Gastroesophageal reflux disease without esophagitis  Stable on famotidine 40 mg daily.  Refills were needed today.  Continue to monitor.   Orders:  •  famotidine (PEPCID) 40 MG tablet; Take 1 tablet by mouth Daily.    Chronic bilateral low back pain without sciatica  Stable on meloxicam 15 mg daily as needed.  Refills were needed today.  Labs done yesterday by Cardiology were reviewed.  Continue to monitor.  Orders:  •  meloxicam (MOBIC) 15 MG tablet; Take 1 tablet by mouth Daily As Needed for Moderate Pain.    Chronic allergic rhinitis  Stable on montelukast 10 mg daily.  Refills were needed today.  Continue to monitor.  Orders:  •  montelukast (SINGULAIR) 10 MG tablet; Take 1 tablet by mouth Every Night.            Follow Up   Return in about 6 months (around 12/6/2025) for Recheck.  Patient was given instructions and counseling regarding his condition or for health maintenance advice. Please see specific information pulled into the AVS if appropriate.

## 2025-06-06 NOTE — ASSESSMENT & PLAN NOTE
Increasing chest pain concerning for unstable angina.  He saw his cardiologist Dr. Proctor yesterday who is scheduling him for left heart cath to be done 6/12.  Orders:    Lipid Panel; Future

## 2025-06-06 NOTE — ASSESSMENT & PLAN NOTE
Stable on rosuvastatin 20 mg daily.  Refills were needed today.  Labs were due today.  Continue to monitor.  Orders:    Lipid Panel; Future    rosuvastatin (CRESTOR) 20 MG tablet; Take 1 tablet by mouth Every Night.

## 2025-06-06 NOTE — ASSESSMENT & PLAN NOTE
Stable on montelukast 10 mg daily.  Refills were needed today.  Continue to monitor.  Orders:    montelukast (SINGULAIR) 10 MG tablet; Take 1 tablet by mouth Every Night.

## 2025-06-06 NOTE — ASSESSMENT & PLAN NOTE
Blood pressure has been running at goal.  Continue amlodipine 5 mg daily and metoprolol succinate 12.5 mg daily.  Refills were needed today.  Labs were needed today.  Continue to monitor.  Orders:    Lipid Panel; Future    amLODIPine (NORVASC) 5 MG tablet; Take 1 tablet by mouth Daily.    metoprolol succinate XL (TOPROL-XL) 25 MG 24 hr tablet; Take 0.5 tablets by mouth Daily.

## 2025-06-12 ENCOUNTER — HOSPITAL ENCOUNTER (OUTPATIENT)
Facility: HOSPITAL | Age: 85
Setting detail: HOSPITAL OUTPATIENT SURGERY
Discharge: HOME | End: 2025-06-12
Attending: STUDENT IN AN ORGANIZED HEALTH CARE EDUCATION/TRAINING PROGRAM | Admitting: STUDENT IN AN ORGANIZED HEALTH CARE EDUCATION/TRAINING PROGRAM
Payer: MEDICARE

## 2025-06-12 VITALS
TEMPERATURE: 97.3 F | DIASTOLIC BLOOD PRESSURE: 76 MMHG | RESPIRATION RATE: 18 BRPM | HEART RATE: 51 BPM | WEIGHT: 186 LBS | BODY MASS INDEX: 29.19 KG/M2 | HEIGHT: 67 IN | OXYGEN SATURATION: 95 % | SYSTOLIC BLOOD PRESSURE: 166 MMHG

## 2025-06-12 DIAGNOSIS — Z95.5 PRESENCE OF STENT IN LEFT CIRCUMFLEX CORONARY ARTERY: Primary | ICD-10-CM

## 2025-06-12 DIAGNOSIS — I20.0 UNSTABLE ANGINA: ICD-10-CM

## 2025-06-12 PROCEDURE — 85347 COAGULATION TIME ACTIVATED: CPT

## 2025-06-12 PROCEDURE — 92978 ENDOLUMINL IVUS OCT C 1ST: CPT | Performed by: STUDENT IN AN ORGANIZED HEALTH CARE EDUCATION/TRAINING PROGRAM

## 2025-06-12 PROCEDURE — C1725 CATH, TRANSLUMIN NON-LASER: HCPCS | Performed by: STUDENT IN AN ORGANIZED HEALTH CARE EDUCATION/TRAINING PROGRAM

## 2025-06-12 PROCEDURE — C1874 STENT, COATED/COV W/DEL SYS: HCPCS | Performed by: STUDENT IN AN ORGANIZED HEALTH CARE EDUCATION/TRAINING PROGRAM

## 2025-06-12 PROCEDURE — 99152 MOD SED SAME PHYS/QHP 5/>YRS: CPT | Performed by: STUDENT IN AN ORGANIZED HEALTH CARE EDUCATION/TRAINING PROGRAM

## 2025-06-12 PROCEDURE — 99153 MOD SED SAME PHYS/QHP EA: CPT | Performed by: STUDENT IN AN ORGANIZED HEALTH CARE EDUCATION/TRAINING PROGRAM

## 2025-06-12 PROCEDURE — 25810000003 SODIUM CHLORIDE 0.9 % SOLUTION: Performed by: STUDENT IN AN ORGANIZED HEALTH CARE EDUCATION/TRAINING PROGRAM

## 2025-06-12 PROCEDURE — C1724 CATH, TRANS ATHEREC,ROTATION: HCPCS | Performed by: STUDENT IN AN ORGANIZED HEALTH CARE EDUCATION/TRAINING PROGRAM

## 2025-06-12 PROCEDURE — C1769 GUIDE WIRE: HCPCS | Performed by: STUDENT IN AN ORGANIZED HEALTH CARE EDUCATION/TRAINING PROGRAM

## 2025-06-12 PROCEDURE — 25010000002 HEPARIN (PORCINE) PER 1000 UNITS: Performed by: STUDENT IN AN ORGANIZED HEALTH CARE EDUCATION/TRAINING PROGRAM

## 2025-06-12 PROCEDURE — C9602 PERC D-E COR STENT ATHER S: HCPCS | Performed by: STUDENT IN AN ORGANIZED HEALTH CARE EDUCATION/TRAINING PROGRAM

## 2025-06-12 PROCEDURE — C1894 INTRO/SHEATH, NON-LASER: HCPCS | Performed by: STUDENT IN AN ORGANIZED HEALTH CARE EDUCATION/TRAINING PROGRAM

## 2025-06-12 PROCEDURE — C1887 CATHETER, GUIDING: HCPCS | Performed by: STUDENT IN AN ORGANIZED HEALTH CARE EDUCATION/TRAINING PROGRAM

## 2025-06-12 PROCEDURE — C9601 PERC DRUG-EL COR STENT BRAN: HCPCS | Performed by: STUDENT IN AN ORGANIZED HEALTH CARE EDUCATION/TRAINING PROGRAM

## 2025-06-12 PROCEDURE — 25010000002 FENTANYL CITRATE (PF) 50 MCG/ML SOLUTION: Performed by: STUDENT IN AN ORGANIZED HEALTH CARE EDUCATION/TRAINING PROGRAM

## 2025-06-12 PROCEDURE — 25010000002 MIDAZOLAM PER 1 MG: Performed by: STUDENT IN AN ORGANIZED HEALTH CARE EDUCATION/TRAINING PROGRAM

## 2025-06-12 PROCEDURE — C1753 CATH, INTRAVAS ULTRASOUND: HCPCS | Performed by: STUDENT IN AN ORGANIZED HEALTH CARE EDUCATION/TRAINING PROGRAM

## 2025-06-12 PROCEDURE — 92933 PRQ TRLML C ATHRC ST ANGIOP1: CPT | Performed by: STUDENT IN AN ORGANIZED HEALTH CARE EDUCATION/TRAINING PROGRAM

## 2025-06-12 PROCEDURE — 93455 CORONARY ART/GRFT ANGIO S&I: CPT | Performed by: STUDENT IN AN ORGANIZED HEALTH CARE EDUCATION/TRAINING PROGRAM

## 2025-06-12 PROCEDURE — 92928 PRQ TCAT PLMT NTRAC ST 1 LES: CPT | Performed by: STUDENT IN AN ORGANIZED HEALTH CARE EDUCATION/TRAINING PROGRAM

## 2025-06-12 PROCEDURE — 25510000001 IOPAMIDOL PER 1 ML: Performed by: STUDENT IN AN ORGANIZED HEALTH CARE EDUCATION/TRAINING PROGRAM

## 2025-06-12 PROCEDURE — 25010000002 LIDOCAINE 2% SOLUTION: Performed by: STUDENT IN AN ORGANIZED HEALTH CARE EDUCATION/TRAINING PROGRAM

## 2025-06-12 DEVICE — XIENCE SKYPOINT™ EVEROLIMUS ELUTING CORONARY STENT SYSTEM 2.75 MM X 12 MM / RAPID-EXCHANGE
Type: IMPLANTABLE DEVICE | Site: CORONARY | Status: FUNCTIONAL
Brand: XIENCE SKYPOINT™

## 2025-06-12 DEVICE — XIENCE SKYPOINT™ EVEROLIMUS ELUTING CORONARY STENT SYSTEM 3.50 MM X 18 MM / RAPID-EXCHANGE
Type: IMPLANTABLE DEVICE | Site: CORONARY | Status: FUNCTIONAL
Brand: XIENCE SKYPOINT™

## 2025-06-12 DEVICE — EVEROLIMUS-ELUTING PLATINUM CHROMIUM CORONARY STENT SYSTEM
Type: IMPLANTABLE DEVICE | Site: CORONARY | Status: FUNCTIONAL
Brand: SYNERGY MEGATRON™

## 2025-06-12 RX ORDER — CLOPIDOGREL BISULFATE 75 MG/1
75 TABLET ORAL DAILY
Qty: 90 TABLET | Refills: 3 | Status: SHIPPED | OUTPATIENT
Start: 2025-06-12

## 2025-06-12 RX ORDER — NITROGLYCERIN 0.4 MG/1
0.4 TABLET SUBLINGUAL
Status: DISCONTINUED | OUTPATIENT
Start: 2025-06-12 | End: 2025-06-12 | Stop reason: HOSPADM

## 2025-06-12 RX ORDER — FENTANYL CITRATE 50 UG/ML
INJECTION, SOLUTION INTRAMUSCULAR; INTRAVENOUS
Status: DISCONTINUED | OUTPATIENT
Start: 2025-06-12 | End: 2025-06-12 | Stop reason: HOSPADM

## 2025-06-12 RX ORDER — ASPIRIN 325 MG
TABLET ORAL
Status: DISCONTINUED | OUTPATIENT
Start: 2025-06-12 | End: 2025-06-12 | Stop reason: HOSPADM

## 2025-06-12 RX ORDER — ACETAMINOPHEN 325 MG/1
650 TABLET ORAL EVERY 4 HOURS PRN
Status: DISCONTINUED | OUTPATIENT
Start: 2025-06-12 | End: 2025-06-12 | Stop reason: HOSPADM

## 2025-06-12 RX ORDER — SODIUM CHLORIDE 9 MG/ML
75 INJECTION, SOLUTION INTRAVENOUS CONTINUOUS
Status: ACTIVE | OUTPATIENT
Start: 2025-06-12 | End: 2025-06-12

## 2025-06-12 RX ORDER — VERAPAMIL HYDROCHLORIDE 2.5 MG/ML
INJECTION INTRAVENOUS
Status: DISCONTINUED | OUTPATIENT
Start: 2025-06-12 | End: 2025-06-12 | Stop reason: HOSPADM

## 2025-06-12 RX ORDER — LIDOCAINE HYDROCHLORIDE 20 MG/ML
INJECTION, SOLUTION INFILTRATION; PERINEURAL
Status: DISCONTINUED | OUTPATIENT
Start: 2025-06-12 | End: 2025-06-12 | Stop reason: HOSPADM

## 2025-06-12 RX ORDER — MIDAZOLAM HYDROCHLORIDE 1 MG/ML
INJECTION, SOLUTION INTRAMUSCULAR; INTRAVENOUS
Status: DISCONTINUED | OUTPATIENT
Start: 2025-06-12 | End: 2025-06-12 | Stop reason: HOSPADM

## 2025-06-12 RX ORDER — NITROGLYCERIN 0.4 MG/1
TABLET SUBLINGUAL
Qty: 45 TABLET | Refills: 11 | Status: SHIPPED | OUTPATIENT
Start: 2025-06-12

## 2025-06-12 RX ORDER — IOPAMIDOL 755 MG/ML
INJECTION, SOLUTION INTRAVASCULAR
Status: DISCONTINUED | OUTPATIENT
Start: 2025-06-12 | End: 2025-06-12 | Stop reason: HOSPADM

## 2025-06-12 RX ORDER — SODIUM CHLORIDE 0.9 % (FLUSH) 0.9 %
10 SYRINGE (ML) INJECTION AS NEEDED
Status: DISCONTINUED | OUTPATIENT
Start: 2025-06-12 | End: 2025-06-12 | Stop reason: HOSPADM

## 2025-06-12 RX ORDER — HEPARIN SODIUM 1000 [USP'U]/ML
INJECTION, SOLUTION INTRAVENOUS; SUBCUTANEOUS
Status: DISCONTINUED | OUTPATIENT
Start: 2025-06-12 | End: 2025-06-12 | Stop reason: HOSPADM

## 2025-06-12 RX ORDER — CLOPIDOGREL BISULFATE 75 MG/1
TABLET ORAL
Status: DISCONTINUED | OUTPATIENT
Start: 2025-06-12 | End: 2025-06-12 | Stop reason: HOSPADM

## 2025-06-12 RX ADMIN — SODIUM CHLORIDE 75 ML/HR: 9 INJECTION, SOLUTION INTRAVENOUS at 08:30

## 2025-06-12 NOTE — CONSULTS
Met with patient and his family to discuss the benefits of cardiac rehab. Requested for referral to be sent to [facility].

## 2025-06-12 NOTE — Clinical Note
First balloon inflation max pressure = 14 tabitha. First balloon inflation duration = 12 seconds. Second inflation of balloon - Max pressure = 20 tabitha. 2nd Inflation of balloon - Duration = 12 seconds. 2nd inflation was done at 10:07 EDT.

## 2025-06-12 NOTE — Clinical Note
First balloon inflation max pressure = 14 tabitha. First balloon inflation duration = 6 seconds. Second inflation of balloon - Max pressure = 20 tabitha. 2nd Inflation of balloon - Duration = 10 seconds. 2nd inflation was done at 10:33 EDT. Third inflation of balloon - Max pressure = 20 tabitha. 3rd Inflation of balloon - Duration = 10 seconds. 3rd inflation was done at 10:33 EDT. Fourth inflation of balloon - Max pressure = 20 tabitha. 4th Inflation o f balloon - Duration = 10 seconds. 4th inflation was done at 10:34 EDT.

## 2025-06-12 NOTE — Clinical Note
First balloon inflation max pressure = 16 tabitha. First balloon inflation duration = 10 seconds. Second inflation of balloon - Max pressure = 20 tabitha. 2nd Inflation of balloon - Duration = 12 seconds. 2nd inflation was done at 10:09 EDT.

## 2025-06-12 NOTE — Clinical Note
First balloon inflation max pressure = 20 tabitha. First balloon inflation duration = 6 seconds. 2nd inflation was done at 10:16 EDT.

## 2025-06-12 NOTE — Clinical Note
Hemostasis started on the left radial artery. R-Band was used in achieving hemostasis. Radial compression device applied to vessel. Hemostasis achieved successfully. Closure device additional comment: 12mL

## 2025-06-12 NOTE — Clinical Note
Rotational atherectomy was performed. 1st Pass rate = 163 RPM. 1st Pass duration = 20 seconds. 2nd Pass rate = 163 RPM. 2nd Pass duration = 23 seconds. 3rd Pass rate = 167 RPM. 3rd Pass duration = 27 seconds. 4th Pass rate = 165 RPM. 4th Pass duration = 26 seconds. 5th Pass duration = 17 seconds.

## 2025-06-12 NOTE — Clinical Note
First balloon inflation max pressure = 18 tabitha. First balloon inflation duration = 6 seconds. Second inflation of balloon - Max pressure = 20 tabitha. 2nd Inflation of balloon - Duration = 6 seconds.

## 2025-06-12 NOTE — DISCHARGE INSTRUCTIONS
[facility]  [address] [place] [address]    Coronary Angioplasty with or without  Stent (Radial Approach) After Care    Refer to this sheet in the next few weeks. These instructions provide you with information on caring for yourself after your procedure. Your health care provider may also give you more specific instructions. Your treatment has been planned according to current medical practices, but problems sometimes occur. Call your health care provider if you have any problems or questions after your procedure.       Home Care Instructions:  You may shower the day after the procedure. Remove the bandage (dressing) and gently wash the site with plain soap and water. Gently pat the site dry. You may apply a band aid daily for 2 days if desired.    Do not apply powder or lotion to the site.  Do not submerge the affected site in water for 3 to 5 days or until the site is completely healed.   Do not flex or bend at the wrist with affected arm for 24 hours.  Do not lift, push or pull anything over 5 pounds for 5 days after your procedure or as directed by your physician.  For a reference, a gallon of milk weighs 8 pounds.    Do not operate machinery or power tools for 24 hours.  Inspect the site at least twice daily. You may notice some bruising at the site and it may be tender for 1 to 2 weeks.     Increase your fluid intake for the next 2 days.    Keep arm elevated for 24 hours.  For the remainder of the day, keep your arm in the “Pledge of Allegiance” position when up and about.    Limit your activity for the next 48 hours and avoid strenuous activity as directed by your physician.   Cardiac Rehab may or may not be ordered.  Please consult with your physician  You may drive 24 hours after the procedure unless otherwise instructed by your caregiver.  A responsible adult should be with you for the first 24 hours after you arrive home.   Do not make any important legal decisions or sign legal  papers for 24 hours. Do not drink alcohol for 24 hours.    Take medicines only as directed by your health care provider.  Blood thinners may be prescribed after your procedure to improve blood flow through the stent.    Metformin or any medications containing Metformin should not be taken for 48 hours after your procedure.    Eat a heart-healthy diet. This should include plenty of fresh fruits and vegetables. Meat should be lean cuts. Avoid the following types of food:    Food that is high in salt.    Canned or highly processed food.    Food that is high in saturated fat or sugar.    Fried food.    Make any other lifestyle changes recommended by your health care provider. This may include:    Not using any tobacco products including cigarettes, chewing tobacco, or electronic cigarettes.   Managing your weight.    Getting regular exercise.    Managing your blood pressure.    Limiting your alcohol intake.    Managing other health problems, such as diabetes.    If you need an MRI after your heart stent was placed, be sure to tell the health care provider who orders the MRI that you have a heart stent.    Keep all follow-up visits as directed by your health care provider.      Call Your Doctor If:    You have unusual pain at the radial/ulnar (wrist) site.  You have redness, warmth, swelling, or pain at the radial/ulnar (wrist) site.  You have drainage (other than a small amount of blood on the dressing).  `You have chills or a fever > 101.  Your arm becomes pale or dark, cool, tingly, or numb.  You develop chest pain, shortness of breath, feel faint or pass out.    You have heavy bleeding from the site.  If you do, hold pressure on the site for 20 minutes.  If the bleeding stops, apply a fresh bandage and call your cardiologist.  However, if you continue to have bleeding, maintain pressure and call 911.    You have any symptoms of a stroke.  Remember BE FAST  B-balance. Sudden trouble walking or loss of  balance.  E-eyes.  Sudden changes in how you see or a sudden onset of a very bad headache.   F-face. Sudden weakness or loss of feeling of the face or facial droop on one side.   A-arms Sudden weakness or numbness in one arm. One arm drifts down if they are both held out in front of you. This happens suddenly and usually on one side of the body.   S-speech.  Sudden trouble speaking, slurred speech or trouble understanding what people are saying.   T-time  Time to call emergency services.  Write down the symptoms and the time they started.

## 2025-06-12 NOTE — Clinical Note
First balloon inflation max pressure = 12 tabitha. First balloon inflation duration = 10 seconds. Second inflation of balloon - Max pressure = 12 tabitha. 2nd Inflation of balloon - Duration = 10 seconds. 2nd inflation was done at 10:24 EDT.

## 2025-06-13 ENCOUNTER — TELEPHONE (OUTPATIENT)
Dept: CARDIOLOGY | Age: 85
End: 2025-06-13
Payer: MEDICARE

## 2025-06-13 LAB
ACT BLD: 256 SECONDS (ref 82–152)
ACT BLD: 285 SECONDS (ref 82–152)
ACT BLD: 331 SECONDS (ref 82–152)

## 2025-06-13 NOTE — TELEPHONE ENCOUNTER
"  Caller: Mukesh Beard Jr. \"Kiran\"    Relationship: Self    Best call back number:     PATIENT IS NEEDING A 1 MONTH HOSPITAL FOLLOW UP POST HEART CATH  " Helical Rim Text: The closure involved the helical rim.

## 2025-06-16 ENCOUNTER — TELEPHONE (OUTPATIENT)
Dept: CARDIOLOGY | Age: 85
End: 2025-06-16

## 2025-06-16 NOTE — TELEPHONE ENCOUNTER
"Caller: Mukesh Beard Jr. \"Kiran\"    Relationship to patient: Self    Best call back number: [phone]    Type of visit: HOSPITAL FU    Requested date: 07.12.25     If rescheduling, when is the original appointment: 07.29.25     Additional notes:PER DISCHARGE NOTES PATIENT NEEDS ONE MONTH HOSPITAL FU ON 07.12.25. NOTHING AVAILABLE TILL 07.29.25 PLEASE CALL AND ADVISE.         "

## 2025-06-18 NOTE — TELEPHONE ENCOUNTER
"  Caller: Mukesh Beard Jr. \"Kiran\"    Relationship: Self    Best call back number: [phone]      What was the call regarding: PATIENT CALLED BACK PLEASE CALL AND ADVISE.      "

## 2025-06-20 NOTE — TELEPHONE ENCOUNTER
Patient scheduled 7/29 with Dr. Benitez by [facility]. Message to HUB team to call and change follow up to be with Janina or Dr. Proctor.     Thanks,   Kanwal

## 2025-07-08 NOTE — PROGRESS NOTES
RM:________                            : 1940  AGE: 84 y.o.      WT: ____________ HT: ______ BP: __________ HR ______   02% _______                   VISIT:   F/U   HOSP  CC __________________________ OTHER _________                                                  PACER/ ICD        EKG TODAY:  Y /  N        SMOKING: Y / N   PPD ________      EXERCISE: ____________________________________________________      SLEEP STUDY : Y / N     POS / NEG    CPAP / BIPAP     WEARING:  Y / N       DIAGNOSIS: ___________________________________   REFILLS  Y / N      CP _____  SOA______ EDEMA_____ DIZZINESS____ PALPITATIONS____

## 2025-07-10 ENCOUNTER — OFFICE VISIT (OUTPATIENT)
Dept: CARDIOLOGY | Age: 85
End: 2025-07-10
Payer: MEDICARE

## 2025-07-10 VITALS
SYSTOLIC BLOOD PRESSURE: 160 MMHG | HEART RATE: 47 BPM | DIASTOLIC BLOOD PRESSURE: 60 MMHG | HEIGHT: 67 IN | BODY MASS INDEX: 29.19 KG/M2 | WEIGHT: 186 LBS

## 2025-07-10 DIAGNOSIS — E78.2 MIXED HYPERLIPIDEMIA: ICD-10-CM

## 2025-07-10 DIAGNOSIS — I25.110 CORONARY ARTERY DISEASE INVOLVING NATIVE CORONARY ARTERY OF NATIVE HEART WITH UNSTABLE ANGINA PECTORIS: Primary | ICD-10-CM

## 2025-07-10 DIAGNOSIS — I10 ESSENTIAL HYPERTENSION: ICD-10-CM

## 2025-07-10 NOTE — PROGRESS NOTES
Date of Office Visit: 07/10/25  Encounter Provider: RICHA Kumar  Place of Service: [facility] CARDIOLOGY  Patient Name: Mukesh Beard Jr.  :1940    Chief Complaint   Patient presents with    Coronary artery disease involving coronary bypass graft of     Mixed hyperlipidemia    Follow-up   :     HPI: Mukesh Beard Jr. is a 84 y.o. male  with  hypertension, hyperlipidemia, trigeminal neuralgia, glaucoma, aortic valve sclerosis, right bundle branch block, coronary artery disease status post coronary bypass grafting and carotid artery stenosis.  He was followed by Dr. Rizo. I will visit with him for the first time and have reviewed his medical record.         He had CABG in May 2000.  LIMA to LAD, vein graft to diagonal, vein graft to the obtuse marginal, vein graft to the second obtuse marginal vein graft to the posterior descending artery.  In , he had issues with vertigo.    He had a nonischemic nuclear stress test 2024.    He did have progressive shortness of breath with exertion.  He he normally walks on treadmill in his house 7 days a week and he developed shortness of breath around 6 or 7 minutes and have to slow down but was able to push through.  He had cardiac catheterization 2025 where he was found to have significant disease including left main disease.  He had rotational atherectomy of the distal left main and rotational atherectomy of the proximal left circumflex with drug-eluting stent to the left main and drug-eluting stent to the mid circumflex which were both overlapping.  He had drug-eluting stent to the OM for and balloon angioplasty of the distal circumflex.        He will start on clopidogrel 75 mg daily in addition to aspirin.      He presents today for reassessment.  He is feeling good and is back walking on his treadmill.  He is doing 3 miles an hour for 24 minutes.  Prior to his recent cath he was doing 3.4 miles an hour  "and 22 minutes.  He had a lot of bruising in his left wrist area but that is now gone and he has no further discomfort.  No near-syncope or syncope.  No chest pain or breathing issues.  He has some swelling in his ankles.  Allergies   Allergen Reactions    Benzalkonium Unknown - Low Severity     Eye redness and swelling           Family and social history reviewed.     ROS  All other systems were reviewed and are negative          Objective:     Vitals:    07/10/25 1140   BP: 160/60   BP Location: Left arm   Patient Position: Sitting   Cuff Size: Adult   Pulse: (!) 47   Weight: 84.4 kg (186 lb)   Height: 170.2 cm (67\")     Body mass index is 29.13 kg/m².    PHYSICAL EXAM:  Cardiovascular:      Normal rate. Regular rhythm.      Murmurs: There is a grade 1/6 systolic murmur at the ULSB.      Comments: Left radial site intact  Edema:     Ankle: bilateral trace edema of the ankle.          ECG 12 Lead    Date/Time: 7/10/2025 11:58 AM  Performed by: Janina Calle APRN    Authorized by: Janina Calle APRN  Comparison: compared with previous ECG   Similar to previous ECG  Rhythm: sinus rhythm  Rate: normal  Conduction: right bundle branch block  T inversion: III, aVF, II, V4, V5 and V6    Clinical impression: abnormal EKG            Current Outpatient Medications   Medication Sig Dispense Refill    amLODIPine (NORVASC) 5 MG tablet Take 1 tablet by mouth Daily. 90 tablet 1    aspirin 81 MG tablet Take 1 tablet by mouth Every Night. Resume 4/14/18      carBAMazepine XR (TEGretol  XR) 100 MG 12 hr tablet Take 1 tablet by mouth 2 (Two) Times a Day. 180 tablet 1    cetirizine (zyrTEC) 10 MG tablet Take 1 tablet by mouth Daily. 90 tablet 3    Cholecalciferol (Vitamin D3) 50 MCG (2000 UT) capsule Take 1 capsule by mouth Every Night. (Patient taking differently: Take 1,000 capsules by mouth Every Night.) 90 capsule 3    clopidogrel (PLAVIX) 75 MG tablet Take 1 tablet by mouth Daily. 90 tablet 3    dorzolamide-timolol (COSOPT PF) " 2-0.5 % ophthalmic solution Administer 1 drop to both eyes 2 (Two) Times a Day.      famotidine (PEPCID) 40 MG tablet Take 1 tablet by mouth Daily. 90 tablet 1    lisinopril (PRINIVIL,ZESTRIL) 40 MG tablet TAKE 1 TABLET DAILY 90 tablet 1    meloxicam (MOBIC) 15 MG tablet Take 1 tablet by mouth Daily As Needed for Moderate Pain. 90 tablet 1    metoprolol succinate XL (TOPROL-XL) 25 MG 24 hr tablet Take 0.5 tablets by mouth Daily. 45 tablet 1    montelukast (SINGULAIR) 10 MG tablet Take 1 tablet by mouth Every Night. 90 tablet 3    niacin 500 MG tablet Take 2 tablets by mouth Every Night. 180 tablet 3    nitroglycerin (NITROSTAT) 0.4 MG SL tablet 1 under the tongue as needed for angina, may repeat q5mins for up three doses 45 tablet 11    rosuvastatin (CRESTOR) 20 MG tablet Take 1 tablet by mouth Every Night. 90 tablet 1     No current facility-administered medications for this visit.     Assessment:      No diagnosis found.     Orders Placed This Encounter   Procedures    ECG 12 Lead     This order was created via procedure documentation     Release to patient:   Routine Release [[id number]]         Plan:       1.  84-year-old gentleman with severe coronary artery disease status post CABG x5 in May 2000.  He had anginal equivalent of dyspnea on exertion resulting in cardiac cath cardiac catheterization 6/12/2025 where he was found to have significant disease including left main disease.  He had rotational atherectomy of the distal left main and rotational atherectomy of the proximal left circumflex with drug-eluting stent to the left main and drug-eluting stent to the mid circumflex which were both overlapping.  He had drug-eluting stent to the OM for and balloon angioplasty of the distal circumflex.  - Continue aspirin and clopidogrel.  I recommend at least 1 year of dual antiplatelet therapy versus indefinitely   - He is already scheduled to restart cardiac rehab at [facility]  2.  Hyperlipidemia on rosuvastatin 20 mg.   Target LDL 70 or less  3.  Hypertension-blood pressure is elevated and I have asked him to start checking at home.  He will also be followed closely in rehab.  I did not recommend any changes today.  4.  Bilateral carotid artery stenoses.  Last duplex showed no significant stenosis December 2023  5.  Faint cardiac murmur-he had aortic valve sclerosis on last echo in 2018  6.  RBBB  7.Trigeminal neuralgia  8. Glaucoma      Call with questions or concerns          It has been a pleasure to participate in this patient's care.      Thank you,  RICHA Kumar      **I used Dragon to dictate this note:**

## 2025-07-21 ENCOUNTER — OFFICE VISIT (OUTPATIENT)
Dept: CARDIAC REHAB | Facility: HOSPITAL | Age: 85
End: 2025-07-21
Payer: MEDICARE

## 2025-07-21 VITALS
SYSTOLIC BLOOD PRESSURE: 138 MMHG | HEART RATE: 51 BPM | BODY MASS INDEX: 29.07 KG/M2 | WEIGHT: 185.63 LBS | DIASTOLIC BLOOD PRESSURE: 82 MMHG | OXYGEN SATURATION: 96 %

## 2025-07-21 DIAGNOSIS — Z95.5 PRESENCE OF STENT IN LEFT CIRCUMFLEX CORONARY ARTERY: Primary | ICD-10-CM

## 2025-07-21 PROCEDURE — 93798 PHYS/QHP OP CAR RHAB W/ECG: CPT

## 2025-07-21 NOTE — PROGRESS NOTES
Chief Complaint  Cardiac Rehab Phase II    Mukesh Beard Jr. presents to [facility] CARDIOPULMONARY REHABILITATION    Physical Exam  Constitutional:       Appearance: Normal appearance.   Cardiovascular:      Rate and Rhythm: Regular rhythm. Bradycardia present.   Pulmonary:      Effort: Pulmonary effort is normal.      Breath sounds: Normal breath sounds.   Musculoskeletal:      Right lower leg: No edema.      Left lower leg: No edema.   Skin:     General: Skin is warm and dry.   Neurological:      Mental Status: He is alert and oriented to person, place, and time.   Psychiatric:         Mood and Affect: Mood normal.         Behavior: Behavior normal.         Thought Content: Thought content normal.         Judgment: Judgment normal.      Comments: PHQ-9 score 5  [facility] score 27  Pt states has had many deaths recently in family but coping. No suicidal ideations declines counselor.  We will reevaluate PHQ-9 score in 30 days      Result Review :          Assessment and Plan    Diagnoses and all orders for this visit:    1. Presence of stent in left circumflex coronary artery (Primary)        Parul Carlisle RN

## 2025-07-21 NOTE — PROGRESS NOTES
Phase II and III Education    Discipline Teaching:  Cardiac Rehab Phase II [x]      Cardiac Rehab Phase III []      Pulmonary Rehab II []      Pulmonary Rehab III []      Peripheral Artery Disease []        Class Given:  Asthma Management []      Beginners Cardiac Rehab []      Beginners Pulmonary Rehab []      CAD I []      CAD II []      CHF []      Diabetes Mellitus []     Diet & Cholesterol []     Diet Consult []      Energy Conservation []     Exercise []     Grocery Store Tour []     Harmonica Therapy []     High Blood Pressure []     Living with COPD []     Medication Safety []     Peripheral Artery Disease []     S & S of Infection []      Stress []        Education Topics:  Angina []      Arrhythmias []      Asthma Management []      Beginning Cardiac Rehab [x]      Blood Pressure [x]     Blood Sugar []     Breathing Retrainment []     CHF []     Cooking []     Daily Weight []     Diabetes Mellitus []      Diet [x]     Energy Conservation []     Exercise [x]      Foot Care []      Grocery Store Tour []      Heart Disease [x]      Heart Function [x]      Incision Care []     Living with COPD []     Medication Safety []     PAD Symptoms/Treatment []     Reconditioning Exercises []     S & S Infection []     Smoking Consult []     Stress Management [x]     Test Results []       Teaching Aids:  Video []      PAD Handout []      Pulmonary Workbook []      CAD Teaching Packet [x]      Stress Teaching Packet [x]     Exercise Teaching Packet [x]      Diet Teaching Packet [x]      CHF Teaching Packet []      Blood Pressure Teaching Packet [x]      Smoking Cessation Packet []      Medication Safety Handout []      Pflex Training []      Diabetes Teaching Packet []      Harmonica Therapy Packet []        Teaching Method:  Discussion [x]      Written Information [x]      Audio Visual [x]      Demonstration []        Teaching Recipient:  Patient []      Family []      Friend []      Legal Guardian []      Primary Care  Giver []      Significant Other []        Barriers To Learning:  None [x]      Auditory []      Cultural []      Emotional []      Financial []      Language []    Mental []      Motivation []      Physical []      Reading Skills []      Mu-ism []      Verbal/Cognitive []      Visual []      Written/Cognitive []        Teaching Response:  Verified Via Teach back [x]      Return Demo Via Teach Back []      Reinforcement Needed []      Unable to Return Demo []      Unable to Comprehend []      Declines Teaching  []        Additional Education Topics:  Instructed pt on exercise goals(RPE/MET level chart, s/s to report, exercise routine), importance of keeping blood pressure log, nutritional goals(BMI/weight reduction, eating habits with completion of Rate Your Plate, lipid panel, etc)<    Follow Up Instruction Comment:

## 2025-07-22 ENCOUNTER — TREATMENT (OUTPATIENT)
Dept: CARDIAC REHAB | Facility: HOSPITAL | Age: 85
End: 2025-07-22
Payer: MEDICARE

## 2025-07-22 DIAGNOSIS — Z95.5 PRESENCE OF STENT IN LEFT CIRCUMFLEX CORONARY ARTERY: Primary | ICD-10-CM

## 2025-07-22 PROCEDURE — 93798 PHYS/QHP OP CAR RHAB W/ECG: CPT

## 2025-07-29 ENCOUNTER — TREATMENT (OUTPATIENT)
Dept: CARDIAC REHAB | Facility: HOSPITAL | Age: 85
End: 2025-07-29
Payer: MEDICARE

## 2025-07-29 DIAGNOSIS — Z95.5 PRESENCE OF STENT IN LEFT CIRCUMFLEX CORONARY ARTERY: Primary | ICD-10-CM

## 2025-07-29 PROCEDURE — 93798 PHYS/QHP OP CAR RHAB W/ECG: CPT

## 2025-07-31 ENCOUNTER — TREATMENT (OUTPATIENT)
Dept: CARDIAC REHAB | Facility: HOSPITAL | Age: 85
End: 2025-07-31
Payer: MEDICARE

## 2025-07-31 DIAGNOSIS — Z95.5 PRESENCE OF STENT IN LEFT CIRCUMFLEX CORONARY ARTERY: Primary | ICD-10-CM

## 2025-07-31 PROCEDURE — 93798 PHYS/QHP OP CAR RHAB W/ECG: CPT

## 2025-08-05 ENCOUNTER — TREATMENT (OUTPATIENT)
Dept: CARDIAC REHAB | Facility: HOSPITAL | Age: 85
End: 2025-08-05
Payer: MEDICARE

## 2025-08-05 DIAGNOSIS — Z95.5 PRESENCE OF STENT IN LEFT CIRCUMFLEX CORONARY ARTERY: Primary | ICD-10-CM

## 2025-08-05 PROCEDURE — 93798 PHYS/QHP OP CAR RHAB W/ECG: CPT

## 2025-08-07 ENCOUNTER — TREATMENT (OUTPATIENT)
Dept: CARDIAC REHAB | Facility: HOSPITAL | Age: 85
End: 2025-08-07
Payer: MEDICARE

## 2025-08-07 DIAGNOSIS — Z95.5 PRESENCE OF STENT IN LEFT CIRCUMFLEX CORONARY ARTERY: Primary | ICD-10-CM

## 2025-08-07 PROCEDURE — 93798 PHYS/QHP OP CAR RHAB W/ECG: CPT

## 2025-08-12 ENCOUNTER — TREATMENT (OUTPATIENT)
Dept: CARDIAC REHAB | Facility: HOSPITAL | Age: 85
End: 2025-08-12
Payer: MEDICARE

## 2025-08-12 DIAGNOSIS — Z95.5 PRESENCE OF STENT IN LEFT CIRCUMFLEX CORONARY ARTERY: Primary | ICD-10-CM

## 2025-08-12 PROCEDURE — 93798 PHYS/QHP OP CAR RHAB W/ECG: CPT

## 2025-08-13 ENCOUNTER — OFFICE VISIT (OUTPATIENT)
Dept: FAMILY MEDICINE CLINIC | Age: 85
End: 2025-08-13
Payer: MEDICARE

## 2025-08-13 ENCOUNTER — RESULTS FOLLOW-UP (OUTPATIENT)
Dept: FAMILY MEDICINE CLINIC | Age: 85
End: 2025-08-13

## 2025-08-13 VITALS
HEIGHT: 67 IN | HEART RATE: 61 BPM | SYSTOLIC BLOOD PRESSURE: 109 MMHG | TEMPERATURE: 98.4 F | BODY MASS INDEX: 29 KG/M2 | DIASTOLIC BLOOD PRESSURE: 77 MMHG | OXYGEN SATURATION: 96 % | WEIGHT: 184.8 LBS

## 2025-08-13 DIAGNOSIS — I10 ESSENTIAL HYPERTENSION: ICD-10-CM

## 2025-08-13 DIAGNOSIS — J02.9 SORE THROAT: ICD-10-CM

## 2025-08-13 DIAGNOSIS — U07.1 COVID-19: Primary | ICD-10-CM

## 2025-08-13 LAB
EXPIRATION DATE: ABNORMAL
EXPIRATION DATE: NORMAL
FLUAV AG UPPER RESP QL IA.RAPID: NOT DETECTED
FLUBV AG UPPER RESP QL IA.RAPID: NOT DETECTED
INTERNAL CONTROL: ABNORMAL
INTERNAL CONTROL: NORMAL
Lab: ABNORMAL
Lab: NORMAL
S PYO AG THROAT QL: NEGATIVE
SARS-COV-2 AG UPPER RESP QL IA.RAPID: DETECTED

## 2025-08-13 PROCEDURE — 1159F MED LIST DOCD IN RCRD: CPT

## 2025-08-13 PROCEDURE — 87081 CULTURE SCREEN ONLY: CPT

## 2025-08-13 PROCEDURE — 3074F SYST BP LT 130 MM HG: CPT

## 2025-08-13 PROCEDURE — 1126F AMNT PAIN NOTED NONE PRSNT: CPT

## 2025-08-13 PROCEDURE — 87428 SARSCOV & INF VIR A&B AG IA: CPT

## 2025-08-13 PROCEDURE — 3078F DIAST BP <80 MM HG: CPT

## 2025-08-13 PROCEDURE — 99214 OFFICE O/P EST MOD 30 MIN: CPT

## 2025-08-13 PROCEDURE — 1160F RVW MEDS BY RX/DR IN RCRD: CPT

## 2025-08-13 PROCEDURE — 87880 STREP A ASSAY W/OPTIC: CPT

## 2025-08-13 RX ORDER — DEXTROMETHORPHAN HYDROBROMIDE AND PROMETHAZINE HYDROCHLORIDE 15; 6.25 MG/5ML; MG/5ML
5 SYRUP ORAL NIGHTLY PRN
Qty: 118 ML | Refills: 0 | Status: SHIPPED | OUTPATIENT
Start: 2025-08-13

## 2025-08-16 LAB — BACTERIA SPEC AEROBE CULT: NORMAL

## 2025-08-19 ENCOUNTER — TREATMENT (OUTPATIENT)
Dept: CARDIAC REHAB | Facility: HOSPITAL | Age: 85
End: 2025-08-19
Payer: MEDICARE

## 2025-08-19 DIAGNOSIS — Z95.5 PRESENCE OF STENT IN LEFT CIRCUMFLEX CORONARY ARTERY: Primary | ICD-10-CM

## 2025-08-19 PROCEDURE — 93798 PHYS/QHP OP CAR RHAB W/ECG: CPT

## 2025-08-21 ENCOUNTER — TREATMENT (OUTPATIENT)
Dept: CARDIAC REHAB | Facility: HOSPITAL | Age: 85
End: 2025-08-21
Payer: MEDICARE

## 2025-08-21 DIAGNOSIS — Z95.5 PRESENCE OF STENT IN LEFT CIRCUMFLEX CORONARY ARTERY: Primary | ICD-10-CM

## 2025-08-21 PROCEDURE — 93798 PHYS/QHP OP CAR RHAB W/ECG: CPT

## 2025-08-26 ENCOUNTER — TREATMENT (OUTPATIENT)
Dept: CARDIAC REHAB | Facility: HOSPITAL | Age: 85
End: 2025-08-26
Payer: MEDICARE

## 2025-08-26 DIAGNOSIS — Z95.5 PRESENCE OF STENT IN LEFT CIRCUMFLEX CORONARY ARTERY: Primary | ICD-10-CM

## 2025-08-26 PROCEDURE — 93798 PHYS/QHP OP CAR RHAB W/ECG: CPT

## 2025-08-28 ENCOUNTER — TREATMENT (OUTPATIENT)
Dept: CARDIAC REHAB | Facility: HOSPITAL | Age: 85
End: 2025-08-28
Payer: MEDICARE

## 2025-08-28 DIAGNOSIS — Z95.5 PRESENCE OF STENT IN LEFT CIRCUMFLEX CORONARY ARTERY: Primary | ICD-10-CM

## 2025-08-28 PROCEDURE — 93798 PHYS/QHP OP CAR RHAB W/ECG: CPT

## (undated) DEVICE — SOL ISO/ALC RUB 70PCT 4OZ

## (undated) DEVICE — MAT FLR ABSORBENT LG 4FT 10 2.5FT

## (undated) DEVICE — NDL SPINE 22G 31/2IN BLK

## (undated) DEVICE — GLV SURG TRIUMPH CLASSIC PF LTX 8.5 STRL

## (undated) DEVICE — KT MANIFLD CARDIAC

## (undated) DEVICE — DRAPE,U/ SHT,SPLIT,PLAS,STERIL: Brand: MEDLINE

## (undated) DEVICE — CATH DIAG IMPULSE MPA2 SH 5F 100CM

## (undated) DEVICE — DRSNG SURESITE WNDW 4X4.5

## (undated) DEVICE — POOLE SUCTION HANDLE: Brand: CARDINAL HEALTH

## (undated) DEVICE — HANDPIECE SET WITH COAXIAL HIGH FLOW TIP AND SUCTION TUBE: Brand: INTERPULSE

## (undated) DEVICE — BIT DRL SCRW PERIPH 2.7MM

## (undated) DEVICE — COPILOT BLEEDBACK CONTROL VALVE: Brand: COPILOT

## (undated) DEVICE — Device: Brand: EXTENSION

## (undated) DEVICE — GLV SURG BIOGEL LTX PF 8 1/2

## (undated) DEVICE — SOL IRR NACL 0.9PCT 3000ML

## (undated) DEVICE — ADHS SKIN DERMABOND TOP ADVANCED

## (undated) DEVICE — 3M™ IOBAN™ 2 ANTIMICROBIAL INCISE DRAPE 6640EZ: Brand: IOBAN™ 2

## (undated) DEVICE — TREK CORONARY DILATATION CATHETER 2.75 MM X 12 MM / RAPID-EXCHANGE: Brand: TREK

## (undated) DEVICE — Device: Brand: ASAHI CORSAIR PRO XS

## (undated) DEVICE — NC TREK NEO™ CORONARY DILATATION CATHETER 5.00 MM X 12 MM / RAPID-EXCHANGE: Brand: NC TREK NEO™

## (undated) DEVICE — PK CATH CARD 40

## (undated) DEVICE — APPL CHLORAPREP W/TINT 26ML ORNG

## (undated) DEVICE — CORONARY IMAGING CATHETER: Brand: OPTICROSS™ 6 HD

## (undated) DEVICE — TOWEL,OR,DSP,ST,BLUE,STD,4/PK,20PK/CS: Brand: MEDLINE

## (undated) DEVICE — TREK CORONARY DILATATION CATHETER 3.50 MM X 15 MM / RAPID-EXCHANGE: Brand: TREK

## (undated) DEVICE — GOWN,REINFORCE,POLY,SIRUS,BREATH SLV,XLG: Brand: MEDLINE

## (undated) DEVICE — PAD,NON-ADHERENT,2X3,STERILE,LF,1/PK: Brand: MEDLINE

## (undated) DEVICE — GLV SURG SENSICARE SLT PF LF 6.5 STRL

## (undated) DEVICE — NC TREK NEO™ CORONARY DILATATION CATHETER 3.50 MM X 20 MM / RAPID-EXCHANGE: Brand: NC TREK NEO™

## (undated) DEVICE — BALN PTCA TAKERU RX NC 4X12MM

## (undated) DEVICE — Device: Brand: ASAHI SION BLUE

## (undated) DEVICE — PK SHLDR OPN 40

## (undated) DEVICE — SYR LUERLOK 30CC

## (undated) DEVICE — CATH URETRL FLXITP POLLACK STD 5F 70CM

## (undated) DEVICE — CATH DIAG IMPULSE FL4 5F 100CM

## (undated) DEVICE — PREP SOL POVIDONE/IODINE BT 4OZ

## (undated) DEVICE — GLIDESHEATH SLENDER STAINLESS STEEL KIT: Brand: GLIDESHEATH SLENDER

## (undated) DEVICE — DGW .035 FC J3MM 260CM TEF: Brand: EMERALD

## (undated) DEVICE — TRY PREP SCRB VAG PVP

## (undated) DEVICE — GW AMPLATZER SS .035 1.5MM J 260CM

## (undated) DEVICE — R2P DESTINATION SLENDER GUIDING SHEATH: Brand: R2P DESTINATION SLENDER

## (undated) DEVICE — PRE-CONNECTED EXCHANGEABLE BURR CATHETER AND BURR ADVANCING DEVICE: Brand: ROTALINK™ PLUS

## (undated) DEVICE — Y-TYPE TUR/BLADDER IRRIGATION SET, REGULATING CLAMP

## (undated) DEVICE — ADHS SKIN DERMABOND

## (undated) DEVICE — GW ATHRCTMY ROTAWIRE DRV FLOP W/WIRECLIP/TORQ FLX 330CM

## (undated) DEVICE — SUT VIC 2/0 CT2 27IN J269H

## (undated) DEVICE — Device: Brand: FIELDER XT

## (undated) DEVICE — CATH GUIDE LAUNCHER EBU4.0 6F 100CM

## (undated) DEVICE — SUT SILK 2/0 TIES 18IN A185H

## (undated) DEVICE — Device

## (undated) DEVICE — BASIC SINGLE BASIN-LF: Brand: MEDLINE INDUSTRIES, INC.

## (undated) DEVICE — NITINOL STONE RETRIEVAL BASKET: Brand: ESCAPE

## (undated) DEVICE — GW CERBRL FC PTFE STD/STR .025 145CM

## (undated) DEVICE — SKIN PREP TRAY W/CHG: Brand: MEDLINE INDUSTRIES, INC.

## (undated) DEVICE — DRSNG TELFA PAD NONADH STR 1S 3X8IN

## (undated) DEVICE — GW SUREGLIDE FLXTIP STR/TP .035 3X150CM EA/5

## (undated) DEVICE — LOU PACE DEFIB: Brand: MEDLINE INDUSTRIES, INC.

## (undated) DEVICE — SYS IRR PUMP SGL ACTN VAC SYR 10CC

## (undated) DEVICE — TUBING, SUCTION, 1/4" X 20', STRAIGHT: Brand: MEDLINE INDUSTRIES, INC.

## (undated) DEVICE — 2108 SERIES SAGITTAL BLADE (19.5 X 1.27 X 81.0MM)

## (undated) DEVICE — CYSTO PACK: Brand: MEDLINE INDUSTRIES, INC.

## (undated) DEVICE — CATH DIAG IMPULSE FR4 5F 100CM

## (undated) DEVICE — CATH DIAG CARD PERFORMA IMA BT 4F100CM

## (undated) DEVICE — BALN PTCA TAKERU RX NC 4X15MM

## (undated) DEVICE — FEMORAL ENTRY ANGIOGRAPHY SHIELD-YELLOW: Brand: RADPAD